# Patient Record
Sex: MALE
[De-identification: names, ages, dates, MRNs, and addresses within clinical notes are randomized per-mention and may not be internally consistent; named-entity substitution may affect disease eponyms.]

---

## 2018-03-28 ENCOUNTER — APPOINTMENT (OUTPATIENT)
Dept: CARDIOLOGY | Facility: CLINIC | Age: 83
End: 2018-03-28

## 2018-03-28 PROBLEM — Z00.00 ENCOUNTER FOR PREVENTIVE HEALTH EXAMINATION: Status: ACTIVE | Noted: 2018-03-28

## 2019-04-08 ENCOUNTER — INPATIENT (INPATIENT)
Facility: HOSPITAL | Age: 84
LOS: 10 days | Discharge: NURSING, REHAB OR LONG-TERM CARE | End: 2019-04-19
Attending: INTERNAL MEDICINE | Admitting: INTERNAL MEDICINE
Payer: MEDICARE

## 2019-04-08 VITALS
DIASTOLIC BLOOD PRESSURE: 65 MMHG | RESPIRATION RATE: 17 BRPM | SYSTOLIC BLOOD PRESSURE: 128 MMHG | HEART RATE: 86 BPM | OXYGEN SATURATION: 100 % | TEMPERATURE: 98 F

## 2019-04-08 LAB
BASOPHILS # BLD AUTO: 0.03 K/UL — SIGNIFICANT CHANGE UP (ref 0–0.2)
BASOPHILS NFR BLD AUTO: 0.3 % — SIGNIFICANT CHANGE UP (ref 0–2)
EOSINOPHIL # BLD AUTO: 0.12 K/UL — SIGNIFICANT CHANGE UP (ref 0–0.5)
EOSINOPHIL NFR BLD AUTO: 1.1 % — SIGNIFICANT CHANGE UP (ref 0–6)
HCT VFR BLD CALC: 39.5 % — SIGNIFICANT CHANGE UP (ref 39–50)
HGB BLD-MCNC: 13.2 G/DL — SIGNIFICANT CHANGE UP (ref 13–17)
IMM GRANULOCYTES NFR BLD AUTO: 0.5 % — SIGNIFICANT CHANGE UP (ref 0–1.5)
LYMPHOCYTES # BLD AUTO: 1.95 K/UL — SIGNIFICANT CHANGE UP (ref 1–3.3)
LYMPHOCYTES # BLD AUTO: 18 % — SIGNIFICANT CHANGE UP (ref 13–44)
MCHC RBC-ENTMCNC: 30.3 PG — SIGNIFICANT CHANGE UP (ref 27–34)
MCHC RBC-ENTMCNC: 33.4 % — SIGNIFICANT CHANGE UP (ref 32–36)
MCV RBC AUTO: 90.6 FL — SIGNIFICANT CHANGE UP (ref 80–100)
MONOCYTES # BLD AUTO: 1.02 K/UL — HIGH (ref 0–0.9)
MONOCYTES NFR BLD AUTO: 9.4 % — SIGNIFICANT CHANGE UP (ref 2–14)
NEUTROPHILS # BLD AUTO: 7.67 K/UL — HIGH (ref 1.8–7.4)
NEUTROPHILS NFR BLD AUTO: 70.7 % — SIGNIFICANT CHANGE UP (ref 43–77)
NRBC # FLD: 0 K/UL — SIGNIFICANT CHANGE UP (ref 0–0)
PLATELET # BLD AUTO: 189 K/UL — SIGNIFICANT CHANGE UP (ref 150–400)
PMV BLD: 10.7 FL — SIGNIFICANT CHANGE UP (ref 7–13)
RBC # BLD: 4.36 M/UL — SIGNIFICANT CHANGE UP (ref 4.2–5.8)
RBC # FLD: 13.7 % — SIGNIFICANT CHANGE UP (ref 10.3–14.5)
WBC # BLD: 10.84 K/UL — HIGH (ref 3.8–10.5)
WBC # FLD AUTO: 10.84 K/UL — HIGH (ref 3.8–10.5)

## 2019-04-08 PROCEDURE — 71046 X-RAY EXAM CHEST 2 VIEWS: CPT | Mod: 26

## 2019-04-08 NOTE — ED PROVIDER NOTE - OBJECTIVE STATEMENT
89M h/o dementia, HTN, DM, frequent falls presents s/p 5 falls today. Family describes that pt stands, starts shaking and then falls. One time hit his head and had LOC for ~5-10 seconds. Was admitted to [facility] last week for similar. Pt frequently gets agitated, has questionable allergy to haldol. Received versed in EMS with improvement in agitation. Pt does not recall falls, does not know why he is here. Denies fever, nausea, vomiting, urinary symptoms, hip pain, back pain, head pain, neck pain. No blood thinners.   PMD: Dr Galeano

## 2019-04-08 NOTE — ED ADULT NURSE NOTE - OBJECTIVE STATEMENT
Pt. received into room # 9, A&O x2 with confusion, brought in by family for frequent falls and syncope tonight after falling. small bruise noted to L knee  and ecchymotic area to R arm. 20g noted to L FA placed by ems. no distress noted. patient placed on fall precautions. call bell within reach, red socks placed on feet.

## 2019-04-08 NOTE — ED PROVIDER NOTE - CARE PLAN
Principal Discharge DX:	Dementia  Secondary Diagnosis:	Falls frequently  Secondary Diagnosis:	Syncope and collapse

## 2019-04-08 NOTE — ED PROVIDER NOTE - CLINICAL SUMMARY MEDICAL DECISION MAKING FREE TEXT BOX
Patient presenting with altered mental status and frequent falls/ ?syncope, likely worsening dementia  - CBC, CMP, VBG, UA, CXR, tox, trop  - CTH/c-spine  - Adm

## 2019-04-08 NOTE — ED PROVIDER NOTE - PROGRESS NOTE DETAILS
[place]agar PGY-4: spoke to Dr Galeano, will admit when results are in. Sagar PGY-4: CT negative, admitted to Dr Galeano, text paged tele PA/MAR

## 2019-04-08 NOTE — ED ADULT NURSE NOTE - INTERVENTIONS DEFINITIONS
[place] to call system/Call bell, personal items and telephone within reach/Instruct patient to call for assistance/Physically safe environment: no spills, clutter or unnecessary equipment/Stretcher in lowest position, wheels locked, appropriate side rails in place/Provide visual cue, wrist band, yellow gown, etc./Monitor gait and stability/Monitor for mental status changes and reorient to person, place, and time/Review medications for side effects contributing to fall risk/Reinforce activity limits and safety measures with patient and family/Provide visual clues: red socks

## 2019-04-08 NOTE — ED ADULT TRIAGE NOTE - CHIEF COMPLAINT QUOTE
as per wife pt has had 5 falls today, hitting back of head with +LOC, wife states she is unable to take care of him at home d/2 the worsening dementia and falls. given 2 mg of versed by EMS

## 2019-04-08 NOTE — ED PROVIDER NOTE - ATTENDING CONTRIBUTION TO CARE
Dr. Gamino: I have personally performed a face to face bedside history and physical examination of this patient. I have discussed the history, examination, review of systems, assessment and plan of management with the resident. I have reviewed the electronic medical record and amended it to reflect my history, review of systems, physical exam, assessment and plan.     Attending Exam - Dr. Gamino: GEN: well appearing, NAD oriented only to self and place  HEENT: +PERRL, EOMI  RESP: CTAB, no signs of respiratory distress CV: s1s2 RRR ABD: soft/non tender/non distended  MSK: no deformities / swelling, normal range of motion, spine grossly normal NEURO: alert oriented x 2 , otherwise non focal exam SKIN: normal color / temperature / condition.

## 2019-04-09 DIAGNOSIS — Z29.9 ENCOUNTER FOR PROPHYLACTIC MEASURES, UNSPECIFIED: ICD-10-CM

## 2019-04-09 DIAGNOSIS — I10 ESSENTIAL (PRIMARY) HYPERTENSION: ICD-10-CM

## 2019-04-09 DIAGNOSIS — F03.90 UNSPECIFIED DEMENTIA WITHOUT BEHAVIORAL DISTURBANCE: ICD-10-CM

## 2019-04-09 DIAGNOSIS — R55 SYNCOPE AND COLLAPSE: ICD-10-CM

## 2019-04-09 DIAGNOSIS — E11.8 TYPE 2 DIABETES MELLITUS WITH UNSPECIFIED COMPLICATIONS: ICD-10-CM

## 2019-04-09 DIAGNOSIS — F03.91 UNSPECIFIED DEMENTIA WITH BEHAVIORAL DISTURBANCE: ICD-10-CM

## 2019-04-09 DIAGNOSIS — N17.9 ACUTE KIDNEY FAILURE, UNSPECIFIED: ICD-10-CM

## 2019-04-09 DIAGNOSIS — Z87.19 PERSONAL HISTORY OF OTHER DISEASES OF THE DIGESTIVE SYSTEM: ICD-10-CM

## 2019-04-09 LAB
ALBUMIN SERPL ELPH-MCNC: 4.1 G/DL — SIGNIFICANT CHANGE UP (ref 3.3–5)
ALP SERPL-CCNC: 72 U/L — SIGNIFICANT CHANGE UP (ref 40–120)
ALT FLD-CCNC: 24 U/L — SIGNIFICANT CHANGE UP (ref 4–41)
ANION GAP SERPL CALC-SCNC: 14 MMO/L — SIGNIFICANT CHANGE UP (ref 7–14)
APAP SERPL-MCNC: < 15 UG/ML — LOW (ref 15–25)
APPEARANCE UR: CLEAR — SIGNIFICANT CHANGE UP
APTT BLD: 21.2 SEC — LOW (ref 27.5–36.3)
AST SERPL-CCNC: 24 U/L — SIGNIFICANT CHANGE UP (ref 4–40)
BACTERIA # UR AUTO: NEGATIVE — SIGNIFICANT CHANGE UP
BILIRUB SERPL-MCNC: 0.4 MG/DL — SIGNIFICANT CHANGE UP (ref 0.2–1.2)
BILIRUB UR-MCNC: NEGATIVE — SIGNIFICANT CHANGE UP
BLOOD UR QL VISUAL: NEGATIVE — SIGNIFICANT CHANGE UP
BUN SERPL-MCNC: 48 MG/DL — HIGH (ref 7–23)
CALCIUM SERPL-MCNC: 9.6 MG/DL — SIGNIFICANT CHANGE UP (ref 8.4–10.5)
CHLORIDE SERPL-SCNC: 104 MMOL/L — SIGNIFICANT CHANGE UP (ref 98–107)
CO2 SERPL-SCNC: 21 MMOL/L — LOW (ref 22–31)
COLOR SPEC: SIGNIFICANT CHANGE UP
CREAT SERPL-MCNC: 1.68 MG/DL — HIGH (ref 0.5–1.3)
ETHANOL BLD-MCNC: < 10 MG/DL — SIGNIFICANT CHANGE UP
GLUCOSE BLDC GLUCOMTR-MCNC: 139 MG/DL — HIGH (ref 70–99)
GLUCOSE SERPL-MCNC: 122 MG/DL — HIGH (ref 70–99)
GLUCOSE UR-MCNC: >1000 — HIGH
HYALINE CASTS # UR AUTO: NEGATIVE — SIGNIFICANT CHANGE UP
INR BLD: 1.05 — SIGNIFICANT CHANGE UP (ref 0.88–1.17)
KETONES UR-MCNC: NEGATIVE — SIGNIFICANT CHANGE UP
LEUKOCYTE ESTERASE UR-ACNC: NEGATIVE — SIGNIFICANT CHANGE UP
NITRITE UR-MCNC: NEGATIVE — SIGNIFICANT CHANGE UP
PH UR: 5.5 — SIGNIFICANT CHANGE UP (ref 5–8)
POTASSIUM SERPL-MCNC: 4.6 MMOL/L — SIGNIFICANT CHANGE UP (ref 3.5–5.3)
POTASSIUM SERPL-SCNC: 4.6 MMOL/L — SIGNIFICANT CHANGE UP (ref 3.5–5.3)
PROT SERPL-MCNC: 7.2 G/DL — SIGNIFICANT CHANGE UP (ref 6–8.3)
PROT UR-MCNC: 20 — SIGNIFICANT CHANGE UP
PROTHROM AB SERPL-ACNC: 12 SEC — SIGNIFICANT CHANGE UP (ref 9.8–13.1)
RBC CASTS # UR COMP ASSIST: SIGNIFICANT CHANGE UP (ref 0–?)
SALICYLATES SERPL-MCNC: < 5 MG/DL — LOW (ref 15–30)
SODIUM SERPL-SCNC: 139 MMOL/L — SIGNIFICANT CHANGE UP (ref 135–145)
SP GR SPEC: 1.02 — SIGNIFICANT CHANGE UP (ref 1–1.04)
SQUAMOUS # UR AUTO: SIGNIFICANT CHANGE UP
TROPONIN T, HIGH SENSITIVITY: 49 NG/L — SIGNIFICANT CHANGE UP (ref ?–14)
TSH SERPL-MCNC: 0.61 UIU/ML — SIGNIFICANT CHANGE UP (ref 0.27–4.2)
UROBILINOGEN FLD QL: NORMAL — SIGNIFICANT CHANGE UP
WBC UR QL: SIGNIFICANT CHANGE UP (ref 0–?)

## 2019-04-09 PROCEDURE — 90792 PSYCH DIAG EVAL W/MED SRVCS: CPT

## 2019-04-09 PROCEDURE — 70450 CT HEAD/BRAIN W/O DYE: CPT | Mod: 26

## 2019-04-09 PROCEDURE — 72125 CT NECK SPINE W/O DYE: CPT | Mod: 26

## 2019-04-09 RX ORDER — INSULIN LISPRO 100/ML
VIAL (ML) SUBCUTANEOUS
Refills: 0 | Status: DISCONTINUED | OUTPATIENT
Start: 2019-04-09 | End: 2019-04-19

## 2019-04-09 RX ORDER — DEXTROSE 50 % IN WATER 50 %
15 SYRINGE (ML) INTRAVENOUS ONCE
Refills: 0 | Status: DISCONTINUED | OUTPATIENT
Start: 2019-04-09 | End: 2019-04-19

## 2019-04-09 RX ORDER — HEPARIN SODIUM 5000 [USP'U]/ML
5000 INJECTION INTRAVENOUS; SUBCUTANEOUS EVERY 8 HOURS
Refills: 0 | Status: DISCONTINUED | OUTPATIENT
Start: 2019-04-09 | End: 2019-04-19

## 2019-04-09 RX ORDER — LANOLIN ALCOHOL/MO/W.PET/CERES
3 CREAM (GRAM) TOPICAL
Refills: 0 | Status: DISCONTINUED | OUTPATIENT
Start: 2019-04-09 | End: 2019-04-19

## 2019-04-09 RX ORDER — SODIUM CHLORIDE 9 MG/ML
1000 INJECTION, SOLUTION INTRAVENOUS
Refills: 0 | Status: DISCONTINUED | OUTPATIENT
Start: 2019-04-09 | End: 2019-04-19

## 2019-04-09 RX ORDER — DEXTROSE 50 % IN WATER 50 %
25 SYRINGE (ML) INTRAVENOUS ONCE
Refills: 0 | Status: DISCONTINUED | OUTPATIENT
Start: 2019-04-09 | End: 2019-04-19

## 2019-04-09 RX ORDER — QUETIAPINE FUMARATE 200 MG/1
12.5 TABLET, FILM COATED ORAL AT BEDTIME
Refills: 0 | Status: DISCONTINUED | OUTPATIENT
Start: 2019-04-09 | End: 2019-04-11

## 2019-04-09 RX ORDER — GLUCAGON INJECTION, SOLUTION 0.5 MG/.1ML
1 INJECTION, SOLUTION SUBCUTANEOUS ONCE
Refills: 0 | Status: DISCONTINUED | OUTPATIENT
Start: 2019-04-09 | End: 2019-04-19

## 2019-04-09 RX ORDER — DEXTROSE 50 % IN WATER 50 %
12.5 SYRINGE (ML) INTRAVENOUS ONCE
Refills: 0 | Status: DISCONTINUED | OUTPATIENT
Start: 2019-04-09 | End: 2019-04-19

## 2019-04-09 RX ADMIN — Medication 3 MILLIGRAM(S): at 21:59

## 2019-04-09 NOTE — CHART NOTE - NSCHARTNOTEFT_GEN_A_CORE
See ER notes  89 yo male, s/p multiple falls yesterday  ED w/u negative  Pt alert, and oriented at present   Will need to review records from [place].   Hydrate, check for orthostatics.                         13.2   10.84 )-----------( 189      ( 08 Apr 2019 23:00 )             39.5     08 Apr 2019 23:00    139    |  104    |  48     ----------------------------<  122    4.6     |  21     |  1.68     Ca    9.6        08 Apr 2019 23:00    TPro  7.2    /  Alb  4.1    /  TBili  0.4    /  DBili  x      /  AST  24     /  ALT  24     /  AlkPhos  72     08 Apr 2019 23:00    PT/INR - ( 08 Apr 2019 23:00 )   PT: 12.0 SEC;   INR: 1.05          PTT - ( 08 Apr 2019 23:00 )  PTT:21.2 SEC

## 2019-04-09 NOTE — H&P ADULT - NSICDXPASTMEDICALHX_GEN_ALL_CORE_FT
PAST MEDICAL HISTORY:  Dementia Dx in 2014    DM (diabetes mellitus) on glipizide    Essential hypertension     HTN (hypertension)     Prostate cancer s/p Radiation seeding, on remission for past 25 years PAST MEDICAL HISTORY:  Dementia Dx in 2014    DM (diabetes mellitus) on glipizide    Essential hypertension     GIB (gastrointestinal bleeding) due to ASA 20 years ago, no egd/ colonoscopy    History of goiter Rt sided dx'd 5 years ago    [place] (hard of hearing) Refused Hearing Aides    Prostate cancer s/p Radiation seeding, in remission for past 25 years

## 2019-04-09 NOTE — H&P ADULT - PROBLEM SELECTOR PLAN 2
Consider Psych consult for med recommendation   Hold haldol due to s/e of transient right facial paralysis for 10hrs  Fall precaution   Cont with 1:1 monitoring Psych consult called, Cont with 1:1 monitoring for now

## 2019-04-09 NOTE — ED ADULT NURSE REASSESSMENT NOTE - NS ED NURSE REASSESS COMMENT FT1
Assumed care pt from night shift RN. Pt in room 10 alert to name, time and . Pt attempting to get out of bed several times, pt easily redirected will continue to monitor pt, awaiting bed assignment

## 2019-04-09 NOTE — BEHAVIORAL HEALTH ASSESSMENT NOTE - SUMMARY
89 yr old M with hx of dementia, DM, Macular Degeneration, Prostate CA admitted to [facility] with recurrent falls and syncope.  Psychiatry consulted to evaluate for worsening dementia with behavioural disturbance vs acute delerium.  Based on collateral history, pt is likely at baseline mental status with no evidence of acute confusional state.  Dtr states pt often has behavioural disturbance at home, including visual hallucinations and aggression, and his current mental status is not worse than usual.  Dtr would not like haldol to be used at this time.    At this time would recommend:  Continue 1:1 at this time  Redirection and reorientation  melatonin 3mg at 8PM daily  seroquel 25 mg qhs PRN for agitation/aggression 89 yr old M with hx of dementia, DM, Macular Degeneration, Prostate CA admitted to [facility] with recurrent falls and syncope.  Psychiatry consulted to evaluate for worsening dementia with behavioural disturbance vs acute delirium.    Based on collateral history, pt is likely at his baseline mental status with no evidence of acute confusional state- moderate to severe dementia with increased supervisional needs/supports in community, intermittent agitation and VH. Family at this time is not keen on starting a standing antipsychotic.     RECOMMENDATIONS=  - Consider an enhanced observation.   - Redirection and reorientation as needed.  - Avoid bzd, anticholinergics and antihistamines  - Labs- vitamin b12 level, folate level, RPR  - Melatonin 3mg at 8PM daily  - Agitation= seroquel 25 mg qhs PRN for agitation/aggression  - SW consult

## 2019-04-09 NOTE — BEHAVIORAL HEALTH ASSESSMENT NOTE - NSBHCHARTREVIEWVS_PSY_A_CORE FT
Vital Signs Last 24 Hrs  T(C): 36.1 (09 Apr 2019 13:25), Max: 36.7 (09 Apr 2019 08:42)  T(F): 97 (09 Apr 2019 13:25), Max: 98 (09 Apr 2019 08:42)  HR: 80 (09 Apr 2019 13:25) (78 - 87)  BP: 155/86 (09 Apr 2019 13:25) (128/65 - 176/73)  BP(mean): --  RR: 17 (09 Apr 2019 13:25) (16 - 17)  SpO2: 100% (09 Apr 2019 13:25) (100% - 100%)
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
## 2019-04-09 NOTE — BEHAVIORAL HEALTH ASSESSMENT NOTE - NSBHCHARTREVIEWLAB_PSY_A_CORE FT
13.2   10.84 )-----------( 189      ( 08 Apr 2019 23:00 )             39.5     04-08    139  |  104  |  48<H>  ----------------------------<  122<H>  4.6   |  21<L>  |  1.68<H>    Ca    9.6      08 Apr 2019 23:00    TPro  7.2  /  Alb  4.1  /  TBili  0.4  /  DBili  x   /  AST  24  /  ALT  24  /  AlkPhos  72  04-08

## 2019-04-09 NOTE — H&P ADULT - HISTORY OF PRESENT ILLNESS
Pt is 89 y/o M with Pmhx of Dementia, DM II, Macular degeneration, Prostate CA ( s/p Radiation seeding on remission for past 25 years), HTN was brought to ED by EMS for syncope and fall X 5 that happened yesterday afternoon around 1pm. Pt is agitated and unable to provide history, therefore collateral information was obtained from wife ( Mary [phone]). Per wife, pt fell 5x, with 4:00am, 4:10, 11:00am, 11:30am, 1:pm time frame. Per wife, at 1pm pt fell and hit back of his head with LOC about 5-10 sec ,does not recall any urinary incontinence and denies any seizure activity. Per wife, pt had repeated falls for past months, with 8 being the most falls. Per wife, pt have fluctuating blood pressure throughout the day ( with highest being 185/86 and lowest being 100/50 and fingerstick ranges from ( 189-300). Per wife, admits pt is adequately hydrated and nourished, but admits to poor quality of life due to worsening dementia. Per wife, noticed  decline in memory, paranoid at times, agitated, and worsening dementia for past 6-8months. Per wife, admits  was recently admitted to [facility] for agitation, and was given Haldol, but had side effect to medication , which he had right sided facial paralysis for 10 hours, now resolved. Per wife, with her best judgment in past 24hrs, pt did not endorse nausea, vomiting, fever, chills, abdominal pain, shortness of breath.     On admission, pt is agitated and not alert to time and place and situation. 89 y/o M with Pmhx of Dementia x5 years, DM II, Macular degeneration, Prostate CA ( s/p Radiation seeding on remission for past 25 years), HTN, h/o GIB was brought to ED by EMS for syncope and fall X 5 that happened yesterday, last episode in the afternoon around 1pm. Pt is agitated and unable to provide history, therefore collateral information was obtained from wife ( Mary [phone]). Per wife, pt fell 5x, at 4:00am, 4:10, 11:00am, 11:30am, 1:pm time frame. Per wife, at 1pm pt fell and hit back of his head with LOC about 5-10 sec ,does not recall any urinary incontinence and denies any seizure activity. Per wife, pt had repeated falls for past months, with 8x being the most falls in 1 day. Per wife, pt has fluctuating blood pressure throughout the day ( with highest being 185/86 and lowest being 100/50 and fingerstick ranges from ( 189-300). Per wife, admits pt is adequately hydrated and nourished, but admits to poor quality of life due to worsening dementia. Per wife, noticed  decline in memory, paranoid at times, agitated, and worsening dementia for past 6-8months. Per wife, admits  was recently admitted to [facility] for agitation, and was given Haldol, but had side effect to medication , which he had right sided facial paralysis for 10 hours, now resolved. Per wife, with her best judgment in past 24hrs, pt did not endorse nausea, vomiting, fever, chills, abdominal pain, shortness of breath.     On admission, pt is agitated and not alert to time and place and situation.

## 2019-04-09 NOTE — H&P ADULT - PROBLEM SELECTOR PLAN 3
cont with Glipizide   monitor FBS   Order Hgb A1C  Diabetic Diet GFR 22%, Trend serial BMP's, consider Renal eval

## 2019-04-09 NOTE — H&P ADULT - PROBLEM SELECTOR PLAN 1
Admitted to telemetry  Syncope 2/2 orthostatic hypotension   CT head: negative for intracranial hemorrhage   obtain TTE   Check Orthostatics vital signs   Serial EKGs  Trend cardiac enzymes: 49  Monitor FBS

## 2019-04-09 NOTE — H&P ADULT - PROBLEM SELECTOR PROBLEM 4
Need for prophylactic measure Type 2 diabetes mellitus with complication, without long-term current use of insulin

## 2019-04-09 NOTE — H&P ADULT - NSICDXFAMILYHX_GEN_ALL_CORE_FT
FAMILY HISTORY:  FH: macular degeneration, Mother and father  FH: type 2 diabetes, Mother, Father, Brother, and sister

## 2019-04-09 NOTE — H&P ADULT - NSHPSOCIALHISTORY_GEN_ALL_CORE
Pt currently lives at home with wife. At baseline, pt is unable to perform ADL, requires full assistance. Per wife, quit smoking about 25 years ago and denies current use of EtOH and illicit drugs. Pt did not receive the 2018 influenza vaccination.

## 2019-04-09 NOTE — H&P ADULT - PROBLEM SELECTOR PROBLEM 3
Type 2 diabetes mellitus with complication, without long-term current use of insulin BECCA (acute kidney injury)

## 2019-04-09 NOTE — H&P ADULT - PROBLEM SELECTOR PLAN 4
Ambulatory as tolerated  Heparin 5000unit SQ q 8hrs monitor FBS and cover with ISS, Order Hgb A1C, Diabetic Diet

## 2019-04-09 NOTE — H&P ADULT - ASSESSMENT
Pt is 89 y/o M with Pmhx of Dementia, DM II, Macular degeneration, Prostate CA ( s/p Radiation seeding on remission for past 25 years), HTN was brought to ED by EMS for syncope and fall X 5 that happened yesterday afternoon around 1pm. Pt is admitted to telemetry for syncope and fall likely to orthostatic hypotension and worsening dementia.       EKG: NSR @ 84 bpm  BUN/Cr: 48/1.68  Serum Glucose: 122  WBC: 10.84  C-xray: clear lungs  CT head: No evidence of intracranial hemorrhage, mass effect or displaced clavicular fracture  CT spine: No evidence of acute displaced fracture or traumatic malalignment cervical degeneration spondylosis, multiple thyroid nodules as described, largest is a lobulated heterogenous right thyroid nodule 4.5X3.8cm  Toxicology panel: APAP < 15ug/ml, Salicylate <15ug/ml, Ethanol <10.0ug/ml Pt is 89 y/o M with Pmhx of Dementia, DM II, GIB 20 years ago, Macular degeneration, Prostate CA ( s/p Radiation seeding in remission for past 25 years), HTN was brought to ED by EMS for syncope and fall X 5 that happened yesterday. Pt is admitted to telemetry for syncope.      EKG: NSR @ 84 bpm  BUN/Cr: 48/1.68

## 2019-04-10 LAB
ALBUMIN SERPL ELPH-MCNC: 3.8 G/DL — SIGNIFICANT CHANGE UP (ref 3.3–5)
ALP SERPL-CCNC: 60 U/L — SIGNIFICANT CHANGE UP (ref 40–120)
ALT FLD-CCNC: 20 U/L — SIGNIFICANT CHANGE UP (ref 4–41)
ANION GAP SERPL CALC-SCNC: 10 MMO/L — SIGNIFICANT CHANGE UP (ref 7–14)
AST SERPL-CCNC: 22 U/L — SIGNIFICANT CHANGE UP (ref 4–40)
BACTERIA UR CULT: SIGNIFICANT CHANGE UP
BASOPHILS # BLD AUTO: 0.04 K/UL — SIGNIFICANT CHANGE UP (ref 0–0.2)
BASOPHILS NFR BLD AUTO: 0.4 % — SIGNIFICANT CHANGE UP (ref 0–2)
BILIRUB SERPL-MCNC: 0.7 MG/DL — SIGNIFICANT CHANGE UP (ref 0.2–1.2)
BUN SERPL-MCNC: 33 MG/DL — HIGH (ref 7–23)
CALCIUM SERPL-MCNC: 9.7 MG/DL — SIGNIFICANT CHANGE UP (ref 8.4–10.5)
CHLORIDE SERPL-SCNC: 102 MMOL/L — SIGNIFICANT CHANGE UP (ref 98–107)
CHOLEST SERPL-MCNC: 188 MG/DL — SIGNIFICANT CHANGE UP (ref 120–199)
CO2 SERPL-SCNC: 24 MMOL/L — SIGNIFICANT CHANGE UP (ref 22–31)
CREAT SERPL-MCNC: 1.4 MG/DL — HIGH (ref 0.5–1.3)
EOSINOPHIL # BLD AUTO: 0.2 K/UL — SIGNIFICANT CHANGE UP (ref 0–0.5)
EOSINOPHIL NFR BLD AUTO: 2.2 % — SIGNIFICANT CHANGE UP (ref 0–6)
FOLATE SERPL-MCNC: 16.3 NG/ML — SIGNIFICANT CHANGE UP (ref 4.7–20)
GLUCOSE BLDC GLUCOMTR-MCNC: 129 MG/DL — HIGH (ref 70–99)
GLUCOSE BLDC GLUCOMTR-MCNC: 142 MG/DL — HIGH (ref 70–99)
GLUCOSE BLDC GLUCOMTR-MCNC: 159 MG/DL — HIGH (ref 70–99)
GLUCOSE BLDC GLUCOMTR-MCNC: 180 MG/DL — HIGH (ref 70–99)
GLUCOSE SERPL-MCNC: 129 MG/DL — HIGH (ref 70–99)
HBA1C BLD-MCNC: 6.1 % — HIGH (ref 4–5.6)
HCT VFR BLD CALC: 39.9 % — SIGNIFICANT CHANGE UP (ref 39–50)
HDLC SERPL-MCNC: 50 MG/DL — SIGNIFICANT CHANGE UP (ref 35–55)
HGB BLD-MCNC: 13.4 G/DL — SIGNIFICANT CHANGE UP (ref 13–17)
IMM GRANULOCYTES NFR BLD AUTO: 0.6 % — SIGNIFICANT CHANGE UP (ref 0–1.5)
LIPID PNL WITH DIRECT LDL SERPL: 144 MG/DL — SIGNIFICANT CHANGE UP
LYMPHOCYTES # BLD AUTO: 1.7 K/UL — SIGNIFICANT CHANGE UP (ref 1–3.3)
LYMPHOCYTES # BLD AUTO: 18.3 % — SIGNIFICANT CHANGE UP (ref 13–44)
MAGNESIUM SERPL-MCNC: 2 MG/DL — SIGNIFICANT CHANGE UP (ref 1.6–2.6)
MCHC RBC-ENTMCNC: 30.2 PG — SIGNIFICANT CHANGE UP (ref 27–34)
MCHC RBC-ENTMCNC: 33.6 % — SIGNIFICANT CHANGE UP (ref 32–36)
MCV RBC AUTO: 90.1 FL — SIGNIFICANT CHANGE UP (ref 80–100)
MONOCYTES # BLD AUTO: 0.83 K/UL — SIGNIFICANT CHANGE UP (ref 0–0.9)
MONOCYTES NFR BLD AUTO: 8.9 % — SIGNIFICANT CHANGE UP (ref 2–14)
NEUTROPHILS # BLD AUTO: 6.45 K/UL — SIGNIFICANT CHANGE UP (ref 1.8–7.4)
NEUTROPHILS NFR BLD AUTO: 69.6 % — SIGNIFICANT CHANGE UP (ref 43–77)
NRBC # FLD: 0 K/UL — SIGNIFICANT CHANGE UP (ref 0–0)
PHOSPHATE SERPL-MCNC: 2.6 MG/DL — SIGNIFICANT CHANGE UP (ref 2.5–4.5)
PLATELET # BLD AUTO: 212 K/UL — SIGNIFICANT CHANGE UP (ref 150–400)
PMV BLD: 10.1 FL — SIGNIFICANT CHANGE UP (ref 7–13)
POTASSIUM SERPL-MCNC: 4.4 MMOL/L — SIGNIFICANT CHANGE UP (ref 3.5–5.3)
POTASSIUM SERPL-SCNC: 4.4 MMOL/L — SIGNIFICANT CHANGE UP (ref 3.5–5.3)
PROT SERPL-MCNC: 6.9 G/DL — SIGNIFICANT CHANGE UP (ref 6–8.3)
RBC # BLD: 4.43 M/UL — SIGNIFICANT CHANGE UP (ref 4.2–5.8)
RBC # FLD: 13.5 % — SIGNIFICANT CHANGE UP (ref 10.3–14.5)
SODIUM SERPL-SCNC: 136 MMOL/L — SIGNIFICANT CHANGE UP (ref 135–145)
SPECIMEN SOURCE: SIGNIFICANT CHANGE UP
T PALLIDUM AB TITR SER: NEGATIVE — SIGNIFICANT CHANGE UP
TRIGL SERPL-MCNC: 72 MG/DL — SIGNIFICANT CHANGE UP (ref 10–149)
TSH SERPL-MCNC: 0.64 UIU/ML — SIGNIFICANT CHANGE UP (ref 0.27–4.2)
VIT B12 SERPL-MCNC: 652 PG/ML — SIGNIFICANT CHANGE UP (ref 200–900)
WBC # BLD: 9.28 K/UL — SIGNIFICANT CHANGE UP (ref 3.8–10.5)
WBC # FLD AUTO: 9.28 K/UL — SIGNIFICANT CHANGE UP (ref 3.8–10.5)

## 2019-04-10 PROCEDURE — 93010 ELECTROCARDIOGRAM REPORT: CPT

## 2019-04-10 PROCEDURE — 99233 SBSQ HOSP IP/OBS HIGH 50: CPT

## 2019-04-10 RX ORDER — OLANZAPINE 15 MG/1
5 TABLET, FILM COATED ORAL ONCE
Refills: 0 | Status: COMPLETED | OUTPATIENT
Start: 2019-04-10 | End: 2019-04-10

## 2019-04-10 RX ORDER — HYDRALAZINE HCL 50 MG
2.5 TABLET ORAL ONCE
Refills: 0 | Status: COMPLETED | OUTPATIENT
Start: 2019-04-10 | End: 2019-04-10

## 2019-04-10 RX ORDER — HYDRALAZINE HCL 50 MG
2.5 TABLET ORAL ONCE
Refills: 0 | Status: DISCONTINUED | OUTPATIENT
Start: 2019-04-10 | End: 2019-04-10

## 2019-04-10 RX ADMIN — Medication 2.5 MILLIGRAM(S): at 00:21

## 2019-04-10 RX ADMIN — QUETIAPINE FUMARATE 12.5 MILLIGRAM(S): 200 TABLET, FILM COATED ORAL at 20:58

## 2019-04-10 RX ADMIN — OLANZAPINE 5 MILLIGRAM(S): 15 TABLET, FILM COATED ORAL at 06:34

## 2019-04-10 NOTE — PROGRESS NOTE BEHAVIORAL HEALTH - NSBHFUPINTERVALHXFT_PSY_A_CORE
Pt seen this morning after he was given 5mg zyprexa dose because he was restless and agitated.  He had an episode of A.fib overnight on telemetry.  Sleepy but rousable to voice. Pt seen this morning after he was given 5mg zyprexa dose because he was restless and agitated.  He had an episode of A.fib overnight on telemetry.  Sleepy but rousable to voice. Does not engage in any conversation as yesterday. No cogwheeling or rigidity on exam

## 2019-04-10 NOTE — CHART NOTE - NSCHARTNOTEFT_GEN_A_CORE
RRT called for syncope     89 y/o M with Pmhx of Dementia x5 years, DM II, Macular degeneration, Prostate CA ( s/p Radiation seeding on remission for past 25 years), HTN, h/o GIB was brought to ED by EMS for syncope and fall.   Patient had been on 1:1 which was discontinued and he had ambulated to the bathroom and passed out on his way back to the bed. Patient helped to a chair by staff and did not hit his head. Syncope was transient and only lasted 10 seconds. Mental status at baseline as per staff. Likely vasovagal syncope.     MD SIERRA Berry

## 2019-04-10 NOTE — CHART NOTE - NSCHARTNOTEFT_GEN_A_CORE
Informed by nurse that patient became agitated and ripped of tele monitor. Patient has order for Seroquel prn as recommended by psych for agitation but is not accepting oral medications. I spoke to pharmacy who recommended olanzapine 5mg IM which I ordered. Will continue to monitor.

## 2019-04-10 NOTE — PROGRESS NOTE BEHAVIORAL HEALTH - SUMMARY
89 yr old M with hx of dementia, DM, Macular Degeneration, Prostate CA admitted to [facility] with recurrent falls and syncope.  Psychiatry consulted for dementia with behavioural symptoms.  Pt received one dose of IM zyprexa 5 mg which may be contributing to lethargy observed today    Recommendations:  -Continue enhanced supervision  -Continue redirection and reorientation as needed  -Cardiac clearence for use for antipsychotic medications  -Continue seroquel 12.5 mg PO prn for agitation  -Would decrease IM zyprexa to 1.25 mg BID PRN for agitation 89 yr old M with hx of dementia, DM, Macular Degeneration, Prostate CA admitted to [facility] with recurrent falls and syncope.  Found to be in atrial fibrillation on telemetry.  At baseline pt requires help with all iADLs and some ADLs.  Is likely unable to be left unsupervised for long periods of time and does have episodes of agitation/aggression.  Psychiatry consulted for dementia with behavioural symptoms.  Pt lethargic today on assessment likely due to 5 mg IM zyprexa given earlier in the morning for agitation    Recommendations:  -Continue enhanced supervision  -Continue redirection and reorientation as needed  -Cardiac clearence for use for antipsychotic medications  -Continue seroquel 12.5 mg PO prn for agitation  -IM zyprexa to 1.25 mg BID PRN for agitation if pt refusing to take PO  -Recommend SW consult due to difficulty for family to manage pt at home 89 yr old M with hx of dementia, DM, Macular Degeneration, Prostate CA admitted to [facility] with recurrent falls and syncope.  Found to be in atrial fibrillation on telemetry.  At baseline pt requires help with all iADLs and some ADLs.  Is likely unable to be left unsupervised for long periods of time and does have episodes of agitation/aggression.  Psychiatry consulted for dementia with behavioural symptoms.  Pt lethargic today on assessment likely due to 5 mg IM zyprexa given earlier in the morning for agitation    Recommendations:  -Enhanced supervision  -Continue redirection and reorientation as needed  -Cardiac clearance for use for antipsychotic medications bea in light of new Afib.  -Continue Seroquel 12.5 mg PO prn for agitation  - ACUTE AGGRESSION NOT RESPONDING TO VERBAL REDIRECTION- IM zyprexa to 1.25 mg BID PRN   -Recommend SW consult due to difficulty for family to manage pt at home

## 2019-04-10 NOTE — CHART NOTE - NSCHARTNOTEFT_GEN_A_CORE
PA note  Pt noted to push PCA out of the way and start to walk to bathroom when he started urinating on the floor and then start sliding to the floor. Pt was caught by RN and placed in a chair. Pt unresponsive for several seconds and then back to baseline MS. A rapid response was called. Pt VS where stable , CM revealed a fib in 90's. Pt presently resting comfortably without complaints. Pt placed on a 1 to 1 for safety .   Rafaela COTO

## 2019-04-10 NOTE — PROGRESS NOTE ADULT - SUBJECTIVE AND OBJECTIVE BOX
Patient is a 89y old  Male who presents with a chief complaint of Syncope and fall X5 yesterday (09 Apr 2019 12:01)      INTERVAL HPI/OVERNIGHT EVENTS: pt now AF     MEDICATIONS  (STANDING):  dextrose 5%. 1000 milliLiter(s) (50 mL/Hr) IV Continuous <Continuous>  dextrose 50% Injectable 12.5 Gram(s) IV Push once  dextrose 50% Injectable 25 Gram(s) IV Push once  dextrose 50% Injectable 25 Gram(s) IV Push once  heparin  Injectable 5000 Unit(s) SubCutaneous every 8 hours  insulin lispro (HumaLOG) corrective regimen sliding scale   SubCutaneous three times a day before meals  melatonin 3 milliGRAM(s) Oral <User Schedule>      ]      Allergies    Haldol (Other)    Intolerances    aspirin (Unknown)          PHYSICAL EXAM:  Vital Signs Last 24 Hrs  T(C): 36.8 (10 Apr 2019 04:45), Max: 36.9 (09 Apr 2019 21:00)  T(F): 98.2 (10 Apr 2019 04:45), Max: 98.4 (09 Apr 2019 21:00)  HR: 87 (10 Apr 2019 04:45) (80 - 98)  BP: 152/92 (10 Apr 2019 04:45) (152/92 - 199/74)  BP(mean): --  RR: 18 (10 Apr 2019 04:45) (17 - 18)  SpO2: 100% (10 Apr 2019 04:45) (100% - 100%)    GENERAL: NAD, well-groomed, well-developed  HEAD:  Atraumatic, Normocephalic  EYES: EOMI, PERRLA, conjunctiva and sclera clear  NECK: Supple, No JVD, Normal thyroid  NERVOUS SYSTEM:  Alert & Oriented X3, Good concentration;  and symmetric  CHEST/LUNG: Clear to auscultation bilaterally; No rales, rhonchi, wheezing, or rubs  HEART: S1S2 irregularly irregular, without murmur, rub nor gallop  ABDOMEN: Soft, Nontender, Nondistended; Bowel sounds present  EXTREMITIES:  2+ Peripheral Pulses, No clubbing, cyanosis, or edema      LABS:                        13.4   9.28  )-----------( 212      ( 10 Apr 2019 06:45 )             39.9     10 Apr 2019 06:45    136    |  102    |  33     ----------------------------<  129    4.4     |  24     |  1.40     Ca    9.7        10 Apr 2019 06:45  Phos  2.6       10 Apr 2019 06:45  Mg     2.0       10 Apr 2019 06:45    TPro  6.9    /  Alb  3.8    /  TBili  0.7    /  DBili  x      /  AST  22     /  ALT  20     /  AlkPhos  60     10 Apr 2019 06:45    PT/INR - ( 08 Apr 2019 23:00 )   PT: 12.0 SEC;   INR: 1.05          PTT - ( 08 Apr 2019 23:00 )  PTT:21.2 SEC  CAPILLARY BLOOD GLUCOSE      POCT Blood Glucose.: 142 mg/dL (10 Apr 2019 09:15)  POCT Blood Glucose.: 139 mg/dL (09 Apr 2019 21:33)      TELEMETRY: af        assessment:  AF, FALLS, 	dementia     Plan:   for echo.  Defer AC for AF due to falls/fall risk   Pending echo, consider EP eval for antiarrhythmic meds (doubt [facility])

## 2019-04-11 LAB
GLUCOSE BLDC GLUCOMTR-MCNC: 104 MG/DL — HIGH (ref 70–99)
GLUCOSE BLDC GLUCOMTR-MCNC: 122 MG/DL — HIGH (ref 70–99)
GLUCOSE BLDC GLUCOMTR-MCNC: 138 MG/DL — HIGH (ref 70–99)
GLUCOSE BLDC GLUCOMTR-MCNC: 179 MG/DL — HIGH (ref 70–99)

## 2019-04-11 PROCEDURE — 99233 SBSQ HOSP IP/OBS HIGH 50: CPT

## 2019-04-11 RX ORDER — QUETIAPINE FUMARATE 200 MG/1
25 TABLET, FILM COATED ORAL ONCE
Refills: 0 | Status: COMPLETED | OUTPATIENT
Start: 2019-04-11 | End: 2019-04-11

## 2019-04-11 RX ORDER — OLANZAPINE 15 MG/1
2.5 TABLET, FILM COATED ORAL ONCE
Refills: 0 | Status: COMPLETED | OUTPATIENT
Start: 2019-04-11 | End: 2019-04-11

## 2019-04-11 RX ORDER — OLANZAPINE 15 MG/1
1.25 TABLET, FILM COATED ORAL ONCE
Refills: 0 | Status: COMPLETED | OUTPATIENT
Start: 2019-04-11 | End: 2019-04-11

## 2019-04-11 RX ORDER — QUETIAPINE FUMARATE 200 MG/1
25 TABLET, FILM COATED ORAL
Refills: 0 | Status: DISCONTINUED | OUTPATIENT
Start: 2019-04-11 | End: 2019-04-11

## 2019-04-11 RX ORDER — QUETIAPINE FUMARATE 200 MG/1
25 TABLET, FILM COATED ORAL DAILY
Refills: 0 | Status: DISCONTINUED | OUTPATIENT
Start: 2019-04-12 | End: 2019-04-12

## 2019-04-11 RX ORDER — OLANZAPINE 15 MG/1
2.5 TABLET, FILM COATED ORAL EVERY 12 HOURS
Refills: 0 | Status: DISCONTINUED | OUTPATIENT
Start: 2019-04-11 | End: 2019-04-12

## 2019-04-11 RX ADMIN — QUETIAPINE FUMARATE 25 MILLIGRAM(S): 200 TABLET, FILM COATED ORAL at 21:21

## 2019-04-11 RX ADMIN — OLANZAPINE 2.5 MILLIGRAM(S): 15 TABLET, FILM COATED ORAL at 17:02

## 2019-04-11 RX ADMIN — OLANZAPINE 1.25 MILLIGRAM(S): 15 TABLET, FILM COATED ORAL at 05:46

## 2019-04-11 RX ADMIN — Medication 3 MILLIGRAM(S): at 21:21

## 2019-04-11 RX ADMIN — OLANZAPINE 2.5 MILLIGRAM(S): 15 TABLET, FILM COATED ORAL at 11:15

## 2019-04-11 NOTE — PROGRESS NOTE BEHAVIORAL HEALTH - PRN MEDS
IM zyprexa given this AM for agitation
seroquel 12.5 mg PO once as 9PM   zyprexa 1.25 mg IM once at 6AM

## 2019-04-11 NOTE — PROVIDER CONTACT NOTE (OTHER) - ASSESSMENT
pt asymptomatic with no c/o distress
Pt aggravated
Pt asymptomatic
Patient extremely agitated, attempting to get oob, combative attempting to hit RN and PCA sitting at bedside. Patient not able to be reoriented

## 2019-04-11 NOTE — PROVIDER CONTACT NOTE (OTHER) - ACTION/TREATMENT ORDERED:
Tele cherelle COTO, made aware. IVP hydralazine given as per orders. will continue to monitor
RR called see sheet
Cards consult. continue to monitor
Continue to try
awaiting orders

## 2019-04-11 NOTE — PHYSICAL THERAPY INITIAL EVALUATION ADULT - ADDITIONAL COMMENTS
As per chart review, pt currently lives at home with wife. At baseline, pt is unable to perform ADL, requires full assistance.

## 2019-04-11 NOTE — PROGRESS NOTE BEHAVIORAL HEALTH - SUMMARY
89 yr old M with hx of dementia, DM, Macular Degeneration, Prostate CA admitted to [facility] with recurrent falls and syncope.  Found to be in atrial fibrillation on telemetry.  At baseline pt requires help with all iADLs and some ADLs.  Is likely unable to be left unsupervised for long periods of time and does have episodes of agitation/aggression.  Psychiatry consulted for dementia with behavioural symptoms.  Pt still with attempts to get out of bed despite use of zyprexa 1.25 and seroquel 12.5    Recommendations:  -Enhanced supervision  -Continue redirection and reorientation as needed  -Cardiac clearance for use for antipsychotic medications bea in light of new Afib.  -Increase seroquel to 25 mg PRN at 6PM for agitation/aggression  -ACUTE AGGRESSION NOT RESPONDING TO VERBAL REDIRECTION- Increase IM zyprexa to 2.5 mg IM BID  -Recommend SW consult due to difficulty for family to manage pt at home.  -Will discuss with family about starting standing antipsychotic at night for behavioural symptoms 89 yr old M with hx of dementia, DM, Macular Degeneration, Prostate CA admitted to [facility] with recurrent falls and syncope.  Found to be in atrial fibrillation on telemetry.  At baseline pt requires help with all iADLs and some ADLs.  Is likely unable to be left unsupervised for long periods of time and does have episodes of agitation/aggression.  Psychiatry consulted for dementia with behavioural symptoms.  Pt still with attempts to get out of bed despite use of zyprexa 1.25 and seroquel 12.5    Recommendations:  -Enhanced supervision  -Continue redirection and reorientation as needed  -Cardiac clearance for use for antipsychotic medications bea in light of new Afib.  -Increase seroquel to 25 mg PRN at 6PM for agitation/aggression  -ACUTE AGGRESSION NOT RESPONDING TO VERBAL REDIRECTION- Increase IM zyprexa to 2.5 mg IM PRN BID  -Recommend SW consult due to difficulty for family to manage pt at home.  -Will discuss with family about starting standing antipsychotic at night for behavioural symptoms

## 2019-04-11 NOTE — PHYSICAL THERAPY INITIAL EVALUATION ADULT - PLANNED THERAPY INTERVENTIONS, PT EVAL
Patient left supine in bed in NAD, call bell in reach, all lines intact. +bed check/balance training/bed mobility training/gait training/strengthening/transfer training

## 2019-04-11 NOTE — PROGRESS NOTE BEHAVIORAL HEALTH - CASE SUMMARY
Met with the patient, discussed case with Dr Batista, impression and plan agreed up. No consent from daughter, and so will keep above medications as as needed for agitation.
Met with the patient with Dr Batista, impression and plan discussed and agreed upon. Patient is sedated today, and does not engage in any conversation. New onset Afib  Can give Seroquel 12.5mg q hs prn po and for acute aggression lower dose of Zyprexa to 1.25mg BID PRN IM. Would need cardiac clearance for antipsychotic use

## 2019-04-11 NOTE — PROGRESS NOTE BEHAVIORAL HEALTH - NSBHFUPINTERVALHXFT_PSY_A_CORE
Pt became had an episode of syncope overnight when he tried to get out of bed to go to the bathroom.  Rapid response was called and pt placed back in bed.  He received seroquel 12.5 mg PO at 9PM last night with not much effect according to nursing staff.  He also received zyprexa 1.25 mg IM this morning as the pt was getting out bed.      Today, the pt is unaware that he in a hospital.  He repeatedly attempted to get out of bed this morning, stating that "I need to get my wife".  He denies any pain.

## 2019-04-11 NOTE — PROVIDER CONTACT NOTE (OTHER) - RECOMMENDATIONS
RR called
COntinue to monitor
Continue to try
Patient has no PRNs ordered, requesting PRN to help calm patient and to be more cooperative

## 2019-04-11 NOTE — PROGRESS NOTE ADULT - SUBJECTIVE AND OBJECTIVE BOX
Patient is a 89y old  Male who presents with a chief complaint of Syncope and fall X5 yesterday (10 Apr 2019 09:36)      INTERVAL HPI/OVERNIGHT EVENTS: see rapid response note    MEDICATIONS  (STANDING):  dextrose 5%. 1000 milliLiter(s) (50 mL/Hr) IV Continuous <Continuous>  dextrose 50% Injectable 12.5 Gram(s) IV Push once  dextrose 50% Injectable 25 Gram(s) IV Push once  dextrose 50% Injectable 25 Gram(s) IV Push once  heparin  Injectable 5000 Unit(s) SubCutaneous every 8 hours  insulin lispro (HumaLOG) corrective regimen sliding scale   SubCutaneous three times a day before meals  melatonin 3 milliGRAM(s) Oral <User Schedule>    MEDICATIONS  (PRN):  dextrose 40% Gel 15 Gram(s) Oral once PRN Blood Glucose LESS THAN 70 milliGRAM(s)/deciliter  glucagon  Injectable 1 milliGRAM(s) IntraMuscular once PRN Glucose LESS THAN 70 milligrams/deciliter  QUEtiapine 12.5 milliGRAM(s) Oral at bedtime PRN Agitation        Orders last 24 hours:  POCT  Blood Glucose (04-10-19 @ 09:17)  POCT  Blood Glucose (04-10-19 @ 12:35)  Complete Blood Count: AM Sched. Collection: 11-Apr-2019 04:00 (04-10-19 @ 15:52)  Basic Metabolic Panel: AM Sched. Collection: 11-Apr-2019 04:00 (04-10-19 @ 15:52)  POCT  Blood Glucose (04-10-19 @ 18:00)  POCT  Blood Glucose (04-10-19 @ 18:28)  OLANZapine Injectable: [Ordered as ZyPREXA Injectable]  1.25 milliGRAM(s), IntraMuscular, once, Stop After 1 Doses  Administration Instructions: Reconstitute 10 mG vial with 2.1 mL sterile water  Final concentration 5mG/mL  This is a Look-alike/Sound-alike Medication  Provider's Contact #: [phone] (04-11-19 @ 05:29)  POCT  Blood Glucose (04-11-19 @ 08:43)      Allergies    Haldol (Other)    Intolerances    aspirin (Unknown)            PHYSICAL EXAM:  Vital Signs Last 24 Hrs  T(C): --  T(F): --  HR: --  BP: --  BP(mean): --  RR: --  SpO2: --    GENERAL: NAD, well-groomed, well-developed  HEAD:  Atraumatic, Normocephalic  EYES: EOMI, PERRLA, conjunctiva and sclera clear  NECK: Supple, No JVD, Normal thyroid  NERVOUS SYSTEM: confused   CHEST/LUNG: Clear to auscultation bilaterally; No rales, rhonchi, wheezing, or rubs  HEART: S1S2 regular, without murmur, rub nor gallop  ABDOMEN: Soft, Nontender, Nondistended; Bowel sounds present        LABS:      Ca    9.7        10 Apr 2019 06:45        CAPILLARY BLOOD GLUCOSE      POCT Blood Glucose.: 122 mg/dL (11 Apr 2019 08:32)  POCT Blood Glucose.: 180 mg/dL (10 Apr 2019 18:27)  POCT Blood Glucose.: 159 mg/dL (10 Apr 2019 17:59)  POCT Blood Glucose.: 129 mg/dL (10 Apr 2019 12:34)  POCT Blood Glucose.: 142 mg/dL (10 Apr 2019 09:15)      TELEMETRY: Now NSR     Hemoglobin A1C, Whole Blood in AM (04.10.19 @ 06:45)    Hemoglobin A1C, Whole Blood: 6.1: High Risk (prediabetic)    5.7 - 6.4 %  Diabetic, diagnostic           > 6.5 %  ADA diabetic treatment goal    < 7.0 %          assessment:  CLARISA Alatorre    Plan:   awaiting echo     D/W PA

## 2019-04-12 DIAGNOSIS — G25.71 DRUG INDUCED AKATHISIA: ICD-10-CM

## 2019-04-12 LAB
GLUCOSE BLDC GLUCOMTR-MCNC: 143 MG/DL — HIGH (ref 70–99)
GLUCOSE BLDC GLUCOMTR-MCNC: 173 MG/DL — HIGH (ref 70–99)
GLUCOSE BLDC GLUCOMTR-MCNC: 98 MG/DL — SIGNIFICANT CHANGE UP (ref 70–99)

## 2019-04-12 PROCEDURE — 99233 SBSQ HOSP IP/OBS HIGH 50: CPT

## 2019-04-12 RX ORDER — CLONAZEPAM 1 MG
0.25 TABLET ORAL AT BEDTIME
Refills: 0 | Status: DISCONTINUED | OUTPATIENT
Start: 2019-04-12 | End: 2019-04-18

## 2019-04-12 RX ORDER — OLANZAPINE 15 MG/1
2.5 TABLET, FILM COATED ORAL ONCE
Refills: 0 | Status: DISCONTINUED | OUTPATIENT
Start: 2019-04-12 | End: 2019-04-12

## 2019-04-12 RX ORDER — CLONAZEPAM 1 MG
0.25 TABLET ORAL AT BEDTIME
Refills: 0 | Status: DISCONTINUED | OUTPATIENT
Start: 2019-04-12 | End: 2019-04-12

## 2019-04-12 RX ADMIN — OLANZAPINE 2.5 MILLIGRAM(S): 15 TABLET, FILM COATED ORAL at 04:57

## 2019-04-12 RX ADMIN — HEPARIN SODIUM 5000 UNIT(S): 5000 INJECTION INTRAVENOUS; SUBCUTANEOUS at 13:33

## 2019-04-12 RX ADMIN — Medication 3 MILLIGRAM(S): at 20:23

## 2019-04-12 RX ADMIN — Medication 0.25 MILLIGRAM(S): at 20:23

## 2019-04-12 NOTE — PROGRESS NOTE ADULT - SUBJECTIVE AND OBJECTIVE BOX
Patient is a 89y old  Male who presents with a chief complaint of Syncope and fall X5 yesterday (11 Apr 2019 08:46)      INTERVAL HPI/OVERNIGHT EVENTS: none. Agitated overnight     MEDICATIONS  (STANDING):  dextrose 5%. 1000 milliLiter(s) (50 mL/Hr) IV Continuous <Continuous>  dextrose 50% Injectable 12.5 Gram(s) IV Push once  dextrose 50% Injectable 25 Gram(s) IV Push once  dextrose 50% Injectable 25 Gram(s) IV Push once  heparin  Injectable 5000 Unit(s) SubCutaneous every 8 hours  insulin lispro (HumaLOG) corrective regimen sliding scale   SubCutaneous three times a day before meals  melatonin 3 milliGRAM(s) Oral <User Schedule>  OLANZapine Injectable 2.5 milliGRAM(s) IntraMuscular every 12 hours    MEDICATIONS  (PRN):  dextrose 40% Gel 15 Gram(s) Oral once PRN Blood Glucose LESS THAN 70 milliGRAM(s)/deciliter  glucagon  Injectable 1 milliGRAM(s) IntraMuscular once PRN Glucose LESS THAN 70 milligrams/deciliter  QUEtiapine 25 milliGRAM(s) Oral daily PRN To be given at 6PM for agitation if needed        Allergies    Haldol (Other)    Intolerances    aspirin (Unknown)      REVIEW OF SYSTEMS:        PHYSICAL EXAM:  Vital Signs Last 24 Hrs  T(C): 36.8 (12 Apr 2019 04:51), Max: 36.8 (12 Apr 2019 04:51)  T(F): 98.3 (12 Apr 2019 04:51), Max: 98.3 (12 Apr 2019 04:51)  HR: 86 (12 Apr 2019 04:51) (82 - 86)  BP: 142/85 (12 Apr 2019 04:51) (141/75 - 148/90)  BP(mean): --  RR: 18 (12 Apr 2019 04:51) (18 - 19)  SpO2: 99% (12 Apr 2019 04:51) (99% - 99%)    GENERAL: NAD, well-groomed, well-developed  HEAD:  Atraumatic, Normocephalic  EYES: EOMI, PERRLA, conjunctiva and sclera clear  NECK: Supple, No JVD, Normal thyroid  NERVOUS SYSTEM:  confused  CHEST/LUNG: Clear to auscultation bilaterally; No rales, rhonchi, wheezing, or rubs  HEART: S1S2 regular, without murmur, rub nor gallop  ABDOMEN: Soft, Nontender, Nondistended; Bowel sounds present        < from: CT Head No Cont (04.09.19 @ 00:42) >  HEAD:    There is no acute hemorrhage, mass effect, midline shift, hydrocephalus,   or extra-axial fluid collection. There are patchy areas of   low-attenuation in the bihemispheric white matter without associated mass   effect, nonspecific but likely the sequela of mild chronic microvascular   change. Partially calcified 8 mm extra-axial soft tissue mass along the   medial left frontal convexity is likely a meningioma.    The ventricles, sulci and cisternal spaces are mildly diffusely prominent   although in proportion compatible with cerebral volume loss. There is no   midline shift or abnormal extra-axial fluid collection.    The intraorbital compartments are unremarkable.    Cyst versus polyp in the left sphenoid sinus. The mastoid air cells are   clear.    < end of copied text >            CAPILLARY BLOOD GLUCOSE      POCT Blood Glucose.: 138 mg/dL (11 Apr 2019 21:42)  POCT Blood Glucose.: 104 mg/dL (11 Apr 2019 17:26)  POCT Blood Glucose.: 179 mg/dL (11 Apr 2019 13:14)      TELEMETRY: NSR        Thyroid Stimulating Hormone, Serum in AM (04.10.19 @ 06:45)    Thyroid Stimulating Hormone, Serum: 0.64 uIU/mL      assessment:  Dementia, PAF      Plan:   await echo[facility]  [facility]   consider Am[facility]    consider neuro eval.

## 2019-04-12 NOTE — PROVIDER CONTACT NOTE (OTHER) - SITUATION
PA made aware patient confused, combative when trying to assess patient, uncooperative and unable to redirect/reorient- PA aware unable to draw AM labs

## 2019-04-12 NOTE — PROGRESS NOTE BEHAVIORAL HEALTH - SUMMARY
89 yr old M with hx of dementia, DM, Macular Degeneration, Prostate CA admitted to [facility] with recurrent falls and syncope.  Found to be in atrial fibrillation on telemetry.  At baseline pt requires help with all iADLs and some ADLs.  Is likely unable to be left unsupervised for long periods of time and does have episodes of agitation/aggression.  Psychiatry consulted for dementia with behavioural symptoms.     4/12= Increasingly restless, purposeless movements in bed. Multiple antipsychotic prn needs in the last 24 hours- likely akathisia.     Recommendations:  -Enhanced supervision  -Continue redirection and reorientation as needed  -Cardiac clearance for use for antipsychotic medications bea in light of new Afib.  - Labs= RECHECK BUN+ Cr. Ensure hydration.   - Ensure patient is not constipated.   - Start Klonopin 0.25mg q HS PO- hold for rr<12.   - Family not consenting to standing antipsychotic use at this point. Will hold off in light of akathisia regardless.   - Will need to consider a Palliative medicine consult next week if continued decline.   - ACUTE AGGRESSION- Zyprexa 1.25mg q 6hrs prn im.

## 2019-04-12 NOTE — PROGRESS NOTE BEHAVIORAL HEALTH - NSBHFUPINTERVALHXFT_PSY_A_CORE
Family meeting held this afternoon- daughter+ wife by patient's side, and son on the phone. Patient is notably restless in bed, purposeless movement of extremities, trying to get out of bed- this is a change from how he was yesterday and day before. Remains confused and disoriented.   Daughter states that patient was on Xanax 0.25mg PO a few months ago and that was very helpful for sleep, restlessness, and distress.   Discussed black box warning of use of antipsychotics in patients with dementia- daughter does not want standing antipsychotics.   Family signed DNR, and is keen on palliation at this point.   Discussed at length about akathisia presenting as worsening agitation, role for benzodiazepine use, and the risks involved- falls, confusion. Son who is a pharmacist is in agreement and so is the daughter.

## 2019-04-13 LAB
ANION GAP SERPL CALC-SCNC: 15 MMO/L — HIGH (ref 7–14)
BUN SERPL-MCNC: 44 MG/DL — HIGH (ref 7–23)
CALCIUM SERPL-MCNC: 9.7 MG/DL — SIGNIFICANT CHANGE UP (ref 8.4–10.5)
CHLORIDE SERPL-SCNC: 106 MMOL/L — SIGNIFICANT CHANGE UP (ref 98–107)
CO2 SERPL-SCNC: 21 MMOL/L — LOW (ref 22–31)
CREAT SERPL-MCNC: 1.59 MG/DL — HIGH (ref 0.5–1.3)
GLUCOSE BLDC GLUCOMTR-MCNC: 114 MG/DL — HIGH (ref 70–99)
GLUCOSE BLDC GLUCOMTR-MCNC: 117 MG/DL — HIGH (ref 70–99)
GLUCOSE BLDC GLUCOMTR-MCNC: 122 MG/DL — HIGH (ref 70–99)
GLUCOSE BLDC GLUCOMTR-MCNC: 123 MG/DL — HIGH (ref 70–99)
GLUCOSE SERPL-MCNC: 119 MG/DL — HIGH (ref 70–99)
HCT VFR BLD CALC: 39.6 % — SIGNIFICANT CHANGE UP (ref 39–50)
HGB BLD-MCNC: 13 G/DL — SIGNIFICANT CHANGE UP (ref 13–17)
MAGNESIUM SERPL-MCNC: 2.4 MG/DL — SIGNIFICANT CHANGE UP (ref 1.6–2.6)
MCHC RBC-ENTMCNC: 29.8 PG — SIGNIFICANT CHANGE UP (ref 27–34)
MCHC RBC-ENTMCNC: 32.8 % — SIGNIFICANT CHANGE UP (ref 32–36)
MCV RBC AUTO: 90.8 FL — SIGNIFICANT CHANGE UP (ref 80–100)
NRBC # FLD: 0 K/UL — SIGNIFICANT CHANGE UP (ref 0–0)
PHOSPHATE SERPL-MCNC: 3.5 MG/DL — SIGNIFICANT CHANGE UP (ref 2.5–4.5)
PLATELET # BLD AUTO: 239 K/UL — SIGNIFICANT CHANGE UP (ref 150–400)
PMV BLD: 9.7 FL — SIGNIFICANT CHANGE UP (ref 7–13)
POTASSIUM SERPL-MCNC: 4.3 MMOL/L — SIGNIFICANT CHANGE UP (ref 3.5–5.3)
POTASSIUM SERPL-SCNC: 4.3 MMOL/L — SIGNIFICANT CHANGE UP (ref 3.5–5.3)
RBC # BLD: 4.36 M/UL — SIGNIFICANT CHANGE UP (ref 4.2–5.8)
RBC # FLD: 13.6 % — SIGNIFICANT CHANGE UP (ref 10.3–14.5)
SODIUM SERPL-SCNC: 142 MMOL/L — SIGNIFICANT CHANGE UP (ref 135–145)
WBC # BLD: 7.95 K/UL — SIGNIFICANT CHANGE UP (ref 3.8–10.5)
WBC # FLD AUTO: 7.95 K/UL — SIGNIFICANT CHANGE UP (ref 3.8–10.5)

## 2019-04-13 RX ADMIN — Medication 0.25 MILLIGRAM(S): at 22:14

## 2019-04-13 RX ADMIN — Medication 3 MILLIGRAM(S): at 22:14

## 2019-04-13 RX ADMIN — HEPARIN SODIUM 5000 UNIT(S): 5000 INJECTION INTRAVENOUS; SUBCUTANEOUS at 04:19

## 2019-04-13 NOTE — PROGRESS NOTE ADULT - SUBJECTIVE AND OBJECTIVE BOX
Patient is a 89y old  Male who presents with a chief complaint of Syncope and fall X5 yesterday (12 Apr 2019 09:04)      INTERVAL HPI/OVERNIGHT EVENTS: agitated overnight     MEDICATIONS  (STANDING):  clonazePAM Oral Disintegrating Tablet 0.25 milliGRAM(s) Oral at bedtime  dextrose 5%. 1000 milliLiter(s) (50 mL/Hr) IV Continuous <Continuous>  dextrose 50% Injectable 12.5 Gram(s) IV Push once  dextrose 50% Injectable 25 Gram(s) IV Push once  dextrose 50% Injectable 25 Gram(s) IV Push once  heparin  Injectable 5000 Unit(s) SubCutaneous every 8 hours  insulin lispro (HumaLOG) corrective regimen sliding scale   SubCutaneous three times a day before meals  melatonin 3 milliGRAM(s) Oral <User Schedule>    MEDICATIONS  (PRN):  dextrose 40% Gel 15 Gram(s) Oral once PRN Blood Glucose LESS THAN 70 milliGRAM(s)/deciliter  glucagon  Injectable 1 milliGRAM(s) IntraMuscular once PRN Glucose LESS THAN 70 milligrams/deciliter          Allergies    Haldol (Other)    Intolerances    aspirin (Unknown)      REVIEW OF SYSTEMS:  CARDIOVASCULAR: No chest pain, palpitations, dizziness, or leg swelling; no shortness of breath     RESPIRATORY: No cough, wheezing, chills or hemoptysis; No shortness of breath    GASTROINTESTINAL: No abdominal or epigastric pain. No nausea, vomiting, or hematemesis; No diarrhea or constipation. No melena or hematochezia.    NEUROLOGICAL: No headaches, memory loss, loss of strength, numbness      PHYSICAL EXAM:  Vital Signs Last 24 Hrs  T(C): 36.7 (13 Apr 2019 04:16), Max: 36.7 (12 Apr 2019 20:47)  T(F): 98 (13 Apr 2019 04:16), Max: 98 (12 Apr 2019 20:47)  HR: 95 (13 Apr 2019 04:16) (89 - 95)  BP: 162/88 (13 Apr 2019 04:16) (154/89 - 162/88)  BP(mean): --  RR: 16 (13 Apr 2019 04:16) (16 - 16)  SpO2: 98% (13 Apr 2019 04:16) (97% - 98%)    GENERAL: NAD, well-groomed, well-developed  HEAD:  Atraumatic, Normocephalic  EYES: EOMI, PERRLA, conjunctiva and sclera clear  NECK: Supple, No JVD, Normal thyroid  NERVOUS SYSTEM:  Alert & Oriented X3, Good concentration;  and symmetric  CHEST/LUNG: Clear to auscultation bilaterally; No rales, rhonchi, wheezing, or rubs  HEART: S1S2 regular, without murmur, rub nor gallop  ABDOMEN: Soft, Nontender, Nondistended; Bowel sounds present        LABS:            CAPILLARY BLOOD GLUCOSE      POCT Blood Glucose.: 173 mg/dL (12 Apr 2019 22:14)  POCT Blood Glucose.: 98 mg/dL (12 Apr 2019 16:46)  POCT Blood Glucose.: 143 mg/dL (12 Apr 2019 09:08)      TELEMETRY: NSR         Consultant(s) Notes Reviewed:  behavioral med/ care coordinator       assessment:  remains NSR.   Echo remains pending  Monitor ECG    Plan:   Behavioral med/ placement.  Check labs.  awaiting echo     Care Discussed with Consultants/Other Providers:

## 2019-04-13 NOTE — PROVIDER CONTACT NOTE (OTHER) - DATE AND TIME:
10-Apr-2019 08:39
10-Apr-2019 12:00
10-Apr-2019 18:39
11-Apr-2019 10:54
12-Apr-2019 09:00
13-Apr-2019 15:29
09-Apr-2019 00:16

## 2019-04-14 LAB
GLUCOSE BLDC GLUCOMTR-MCNC: 112 MG/DL — HIGH (ref 70–99)
GLUCOSE BLDC GLUCOMTR-MCNC: 149 MG/DL — HIGH (ref 70–99)

## 2019-04-14 RX ADMIN — Medication 3 MILLIGRAM(S): at 19:50

## 2019-04-14 NOTE — PROGRESS NOTE ADULT - SUBJECTIVE AND OBJECTIVE BOX
Patient is a 89y old  Male who presents with a chief complaint of Syncope and fall X5 yesterday (13 Apr 2019 07:42)      INTERVAL HPI/OVERNIGHT EVENTS:    MEDICATIONS  (STANDING):  clonazePAM Oral Disintegrating Tablet 0.25 milliGRAM(s) Oral at bedtime  dextrose 5%. 1000 milliLiter(s) (50 mL/Hr) IV Continuous <Continuous>  dextrose 50% Injectable 12.5 Gram(s) IV Push once  dextrose 50% Injectable 25 Gram(s) IV Push once  dextrose 50% Injectable 25 Gram(s) IV Push once  heparin  Injectable 5000 Unit(s) SubCutaneous every 8 hours  insulin lispro (HumaLOG) corrective regimen sliding scale   SubCutaneous three times a day before meals  melatonin 3 milliGRAM(s) Oral <User Schedule>    MEDICATIONS  (PRN):  dextrose 40% Gel 15 Gram(s) Oral once PRN Blood Glucose LESS THAN 70 milliGRAM(s)/deciliter  glucagon  Injectable 1 milliGRAM(s) IntraMuscular once PRN Glucose LESS THAN 70 milligrams/deciliter        Orders last 24 hours:  POCT  Blood Glucose (04-13-19 @ 09:08)  POCT  Blood Glucose (04-13-19 @ 12:39)  POCT  Blood Glucose (04-13-19 @ 17:17)  POCT  Blood Glucose (04-13-19 @ 22:17)      Allergies    Haldol (Other)    Intolerances    aspirin (Unknown)            PHYSICAL EXAM:  Vital Signs Last 24 Hrs  T(C): 36.4 (14 Apr 2019 06:44), Max: 36.6 (13 Apr 2019 15:20)  T(F): 97.6 (14 Apr 2019 06:44), Max: 97.8 (13 Apr 2019 15:20)  HR: 75 (14 Apr 2019 06:44) (75 - 80)  BP: 159/89 (14 Apr 2019 06:44) (142/86 - 181/86)  BP(mean): --  RR: 16 (14 Apr 2019 06:44) (16 - 16)  SpO2: 98% (14 Apr 2019 06:44) (98% - 99%)    GENERAL: NAD, well-groomed, well-developed  HEAD:  Atraumatic, Normocephalic  EYES: EOMI, PERRLA, conjunctiva and sclera clear  NECK: Supple, No JVD, Normal thyroid  NERVOUS SYSTEM:  confused   CHEST/LUNG: Clear to auscultation bilaterally; No rales, rhonchi, wheezing, or rubs  HEART: S4S1S2 regular, without murmur, rub nor gallop  ABDOMEN: Soft, Nontender, Nondistended; Bowel sounds present        LABS:      Ca    9.7        13 Apr 2019 07:15        CAPILLARY BLOOD GLUCOSE      POCT Blood Glucose.: 123 mg/dL (13 Apr 2019 22:13)  POCT Blood Glucose.: 122 mg/dL (13 Apr 2019 17:16)  POCT Blood Glucose.: 114 mg/dL (13 Apr 2019 12:38)  POCT Blood Glucose.: 117 mg/dL (13 Apr 2019 09:06)    Telem- remains NSR        assessment:  pt stable.  Echo pending   Meanwhile remains NSR.  ? Placement   MOLST form signed in chart.

## 2019-04-15 LAB
ANION GAP SERPL CALC-SCNC: 14 MMO/L — SIGNIFICANT CHANGE UP (ref 7–14)
BUN SERPL-MCNC: 59 MG/DL — HIGH (ref 7–23)
CALCIUM SERPL-MCNC: 9.5 MG/DL — SIGNIFICANT CHANGE UP (ref 8.4–10.5)
CHLORIDE SERPL-SCNC: 102 MMOL/L — SIGNIFICANT CHANGE UP (ref 98–107)
CO2 SERPL-SCNC: 21 MMOL/L — LOW (ref 22–31)
CREAT SERPL-MCNC: 1.63 MG/DL — HIGH (ref 0.5–1.3)
GLUCOSE BLDC GLUCOMTR-MCNC: 108 MG/DL — HIGH (ref 70–99)
GLUCOSE BLDC GLUCOMTR-MCNC: 112 MG/DL — HIGH (ref 70–99)
GLUCOSE SERPL-MCNC: 127 MG/DL — HIGH (ref 70–99)
HCT VFR BLD CALC: 40.8 % — SIGNIFICANT CHANGE UP (ref 39–50)
HGB BLD-MCNC: 13.5 G/DL — SIGNIFICANT CHANGE UP (ref 13–17)
MAGNESIUM SERPL-MCNC: 2.4 MG/DL — SIGNIFICANT CHANGE UP (ref 1.6–2.6)
MCHC RBC-ENTMCNC: 30.7 PG — SIGNIFICANT CHANGE UP (ref 27–34)
MCHC RBC-ENTMCNC: 33.1 % — SIGNIFICANT CHANGE UP (ref 32–36)
MCV RBC AUTO: 92.7 FL — SIGNIFICANT CHANGE UP (ref 80–100)
NRBC # FLD: 0 K/UL — SIGNIFICANT CHANGE UP (ref 0–0)
PHOSPHATE SERPL-MCNC: 2.9 MG/DL — SIGNIFICANT CHANGE UP (ref 2.5–4.5)
PLATELET # BLD AUTO: 214 K/UL — SIGNIFICANT CHANGE UP (ref 150–400)
PMV BLD: 9.6 FL — SIGNIFICANT CHANGE UP (ref 7–13)
POTASSIUM SERPL-MCNC: 4.8 MMOL/L — SIGNIFICANT CHANGE UP (ref 3.5–5.3)
POTASSIUM SERPL-SCNC: 4.8 MMOL/L — SIGNIFICANT CHANGE UP (ref 3.5–5.3)
RBC # BLD: 4.4 M/UL — SIGNIFICANT CHANGE UP (ref 4.2–5.8)
RBC # FLD: 13.3 % — SIGNIFICANT CHANGE UP (ref 10.3–14.5)
SODIUM SERPL-SCNC: 137 MMOL/L — SIGNIFICANT CHANGE UP (ref 135–145)
WBC # BLD: 9.9 K/UL — SIGNIFICANT CHANGE UP (ref 3.8–10.5)
WBC # FLD AUTO: 9.9 K/UL — SIGNIFICANT CHANGE UP (ref 3.8–10.5)

## 2019-04-15 PROCEDURE — 93306 TTE W/DOPPLER COMPLETE: CPT | Mod: 26

## 2019-04-15 PROCEDURE — 99233 SBSQ HOSP IP/OBS HIGH 50: CPT

## 2019-04-15 RX ORDER — OLANZAPINE 15 MG/1
1.25 TABLET, FILM COATED ORAL ONCE
Refills: 0 | Status: DISCONTINUED | OUTPATIENT
Start: 2019-04-15 | End: 2019-04-15

## 2019-04-15 RX ORDER — SODIUM CHLORIDE 9 MG/ML
250 INJECTION INTRAMUSCULAR; INTRAVENOUS; SUBCUTANEOUS ONCE
Refills: 0 | Status: COMPLETED | OUTPATIENT
Start: 2019-04-15 | End: 2019-04-15

## 2019-04-15 RX ADMIN — Medication 3 MILLIGRAM(S): at 21:06

## 2019-04-15 RX ADMIN — SODIUM CHLORIDE 250 MILLILITER(S): 9 INJECTION INTRAMUSCULAR; INTRAVENOUS; SUBCUTANEOUS at 06:46

## 2019-04-15 RX ADMIN — HEPARIN SODIUM 5000 UNIT(S): 5000 INJECTION INTRAVENOUS; SUBCUTANEOUS at 21:06

## 2019-04-15 RX ADMIN — Medication 0.25 MILLIGRAM(S): at 18:05

## 2019-04-15 NOTE — PROGRESS NOTE BEHAVIORAL HEALTH - NSBHFUPINTERVALHXFT_PSY_A_CORE
Met with the patient. Much better in terms of restlessness and general attitude. Jovial, funny, engages very pleasantly with MD. Confused, disoriented, confabulating. No a/vh or paranoia. No distress.   Care coordinated with daughter- who states that patient is much better. She was hoping for an increased dose of Klonopin. Educated her that an increase dose would contribute to lethargy and falls risk at this point bea considering that akathisia is better treated

## 2019-04-15 NOTE — PROGRESS NOTE ADULT - SUBJECTIVE AND OBJECTIVE BOX
Patient is a 89y old  Male who presents with a chief complaint of Syncope and fall X5 yesterday (14 Apr 2019 08:07)      INTERVAL HPI/OVERNIGHT EVENTS: remains     MEDICATIONS  (STANDING):  clonazePAM Oral Disintegrating Tablet 0.25 milliGRAM(s) Oral at bedtime  dextrose 5%. 1000 milliLiter(s) (50 mL/Hr) IV Continuous <Continuous>  dextrose 50% Injectable 12.5 Gram(s) IV Push once  dextrose 50% Injectable 25 Gram(s) IV Push once  dextrose 50% Injectable 25 Gram(s) IV Push once  heparin  Injectable 5000 Unit(s) SubCutaneous every 8 hours  insulin lispro (HumaLOG) corrective regimen sliding scale   SubCutaneous three times a day before meals  melatonin 3 milliGRAM(s) Oral <User Schedule>    MEDICATIONS  (PRN):  dextrose 40% Gel 15 Gram(s) Oral once PRN Blood Glucose LESS THAN 70 milliGRAM(s)/deciliter  glucagon  Injectable 1 milliGRAM(s) IntraMuscular once PRN Glucose LESS THAN 70 milligrams/deciliter        Orders last 24 hours:  POCT  Blood Glucose (04-14-19 @ 13:13)  sodium chloride 0.9% Bolus:   250 milliLiter(s), IV Bolus, once, infuse over 1 Hour(s), Stop After 1 Doses  Provider's Contact #: [phone] (04-15-19 @ 06:06)  POCT  Blood Glucose (04-15-19 @ 08:41)      Allergies    Haldol (Other)    Intolerances    aspirin (Unknown)      REVIEW OF SYSTEMS:  CARDIOVASCULAR: No chest pain, palpitations, dizziness, or leg swelling; no shortness of breath     RESPIRATORY: No cough, wheezing, chills or hemoptysis; No shortness of breath    GASTROINTESTINAL: No abdominal or epigastric pain. No nausea, vomiting, or hematemesis; No diarrhea or constipation. No melena or hematochezia.    NEUROLOGICAL: No headaches, memory loss, loss of strength, numbness      PHYSICAL EXAM:  Vital Signs Last 24 Hrs  T(C): 36.9 (15 Apr 2019 06:16), Max: 36.9 (15 Apr 2019 06:16)  T(F): 98.5 (15 Apr 2019 06:16), Max: 98.5 (15 Apr 2019 06:16)  HR: 84 (15 Apr 2019 06:16) (75 - 84)  BP: 107/70 (15 Apr 2019 06:16) (107/70 - 165/78)  BP(mean): --  RR: 16 (15 Apr 2019 06:16) (16 - 18)  SpO2: 100% (15 Apr 2019 06:16) (99% - 100%)    GENERAL: NAD, well-groomed, well-developed  HEAD:  Atraumatic, Normocephalic  EYES: EOMI, PERRLA, conjunctiva and sclera clear  NECK: Supple, No JVD, Normal thyroid  NERVOUS SYSTEM:  Alert & confused   CHEST/LUNG: Clear to auscultation bilaterally; No rales, rhonchi, wheezing, or rubs  HEART: S1S2 regular, without murmur, rub nor gallop  ABDOMEN: Soft, Nontender, Nondistended; Bowel sounds present  EXTREMITIES:  2+ Peripheral Pulses, No clubbing, cyanosis, or edema      LABS:                        13.5   9.90  )-----------( 214      ( 15 Apr 2019 06:30 )             40.8     15 Apr 2019 06:30    137    |  102    |  59     ----------------------------<  127    4.8     |  21     |  1.63     Ca    9.5        15 Apr 2019 06:30  Phos  2.9       15 Apr 2019 06:30  Mg     2.4       15 Apr 2019 06:30        CAPILLARY BLOOD GLUCOSE      POCT Blood Glucose.: 112 mg/dL (15 Apr 2019 08:40)  POCT Blood Glucose.: 149 mg/dL (14 Apr 2019 13:02)        assessment:  pt noted to be orthostatic this morning      Plan:   remains NSR.  Need echo to properly treat orthostatic hypotension.   Both PAF and orthostasis can be the cause of patient' s syncopal episdoes     Care Discussed with Consultants/Other Providers: pt's PA

## 2019-04-15 NOTE — PROGRESS NOTE BEHAVIORAL HEALTH - NSBHCHARTREVIEWLAB_PSY_A_CORE FT
CBC Full  -  ( 15 Apr 2019 06:30 )  WBC Count : 9.90 K/uL  RBC Count : 4.40 M/uL  Hemoglobin : 13.5 g/dL  Hematocrit : 40.8 %  Platelet Count - Automated : 214 K/uL  Mean Cell Volume : 92.7 fL  Mean Cell Hemoglobin : 30.7 pg  Mean Cell Hemoglobin Concentration : 33.1 %  Auto Neutrophil # : x  Auto Lymphocyte # : x  Auto Monocyte # : x  Auto Eosinophil # : x  Auto Basophil # : x  Auto Neutrophil % : x  Auto Lymphocyte % : x  Auto Monocyte % : x  Auto Eosinophil % : x  Auto Basophil % : x  04-15    137  |  102  |  59<H>  ----------------------------<  127<H>  4.8   |  21<L>  |  1.63<H>    Ca    9.5      15 Apr 2019 06:30  Phos  2.9     04-15  Mg     2.4     04-15
04-10    136  |  102  |  33<H>  ----------------------------<  129<H>  4.4   |  24  |  1.40<H>    Ca    9.7      10 Apr 2019 06:45  Phos  2.6     04-10  Mg     2.0     04-10    TPro  6.9  /  Alb  3.8  /  TBili  0.7  /  DBili  x   /  AST  22  /  ALT  20  /  AlkPhos  60  04-10                          13.4   9.28  )-----------( 212      ( 10 Apr 2019 06:45 )             39.9
04-10    136  |  102  |  33<H>  ----------------------------<  129<H>  4.4   |  24  |  1.40<H>    Ca    9.7      10 Apr 2019 06:45  Phos  2.6     04-10  Mg     2.0     04-10    TPro  6.9  /  Alb  3.8  /  TBili  0.7  /  DBili  x   /  AST  22  /  ALT  20  /  AlkPhos  60  04-10                        13.4   9.28  )-----------( 212      ( 10 Apr 2019 06:45 )             39.9

## 2019-04-15 NOTE — PROGRESS NOTE BEHAVIORAL HEALTH - SUMMARY
89 yr old M with hx of dementia, DM, Macular Degeneration, Prostate CA admitted to [facility] with recurrent falls and syncope.  Found to be in atrial fibrillation on telemetry.  At baseline pt requires help with all iADLs and some ADLs.  Is likely unable to be left unsupervised for long periods of time and does have episodes of agitation/aggression.  Psychiatry consulted for dementia with behavioural symptoms.     4/12= Increasingly restless, purposeless movements in bed. Multiple antipsychotic prn needs in the last 24 hours- likely akathisia.    4/15- much improved     Recommendations:  -Enhanced supervision  -Continue redirection and reorientation as needed  -Cardiac clearance for use for antipsychotic medications bea in light of new Afib.  - Ensure hydration.   - Ensure patient is not constipated.   - Continue Klonopin 0.25mg q HS PO- hold for rr<12.   - Family not consenting to standing antipsychotic use at this point. Will hold off in light of akathisia regardless.      - ACUTE AGGRESSION- Zyprexa 1.25mg q 6hrs prn im.

## 2019-04-16 LAB
ANION GAP SERPL CALC-SCNC: 15 MMO/L — HIGH (ref 7–14)
BASOPHILS # BLD AUTO: 0.04 K/UL — SIGNIFICANT CHANGE UP (ref 0–0.2)
BASOPHILS NFR BLD AUTO: 0.6 % — SIGNIFICANT CHANGE UP (ref 0–2)
BUN SERPL-MCNC: 50 MG/DL — HIGH (ref 7–23)
CALCIUM SERPL-MCNC: 9.5 MG/DL — SIGNIFICANT CHANGE UP (ref 8.4–10.5)
CHLORIDE SERPL-SCNC: 104 MMOL/L — SIGNIFICANT CHANGE UP (ref 98–107)
CO2 SERPL-SCNC: 21 MMOL/L — LOW (ref 22–31)
CREAT SERPL-MCNC: 1.51 MG/DL — HIGH (ref 0.5–1.3)
EOSINOPHIL # BLD AUTO: 0.09 K/UL — SIGNIFICANT CHANGE UP (ref 0–0.5)
EOSINOPHIL NFR BLD AUTO: 1.3 % — SIGNIFICANT CHANGE UP (ref 0–6)
GLUCOSE BLDC GLUCOMTR-MCNC: 139 MG/DL — HIGH (ref 70–99)
GLUCOSE SERPL-MCNC: 139 MG/DL — HIGH (ref 70–99)
HCT VFR BLD CALC: 38.8 % — LOW (ref 39–50)
HGB BLD-MCNC: 12.9 G/DL — LOW (ref 13–17)
IMM GRANULOCYTES NFR BLD AUTO: 0.7 % — SIGNIFICANT CHANGE UP (ref 0–1.5)
LYMPHOCYTES # BLD AUTO: 1.24 K/UL — SIGNIFICANT CHANGE UP (ref 1–3.3)
LYMPHOCYTES # BLD AUTO: 18.3 % — SIGNIFICANT CHANGE UP (ref 13–44)
MAGNESIUM SERPL-MCNC: 2.5 MG/DL — SIGNIFICANT CHANGE UP (ref 1.6–2.6)
MCHC RBC-ENTMCNC: 30.1 PG — SIGNIFICANT CHANGE UP (ref 27–34)
MCHC RBC-ENTMCNC: 33.2 % — SIGNIFICANT CHANGE UP (ref 32–36)
MCV RBC AUTO: 90.7 FL — SIGNIFICANT CHANGE UP (ref 80–100)
MONOCYTES # BLD AUTO: 0.68 K/UL — SIGNIFICANT CHANGE UP (ref 0–0.9)
MONOCYTES NFR BLD AUTO: 10.1 % — SIGNIFICANT CHANGE UP (ref 2–14)
NEUTROPHILS # BLD AUTO: 4.66 K/UL — SIGNIFICANT CHANGE UP (ref 1.8–7.4)
NEUTROPHILS NFR BLD AUTO: 69 % — SIGNIFICANT CHANGE UP (ref 43–77)
NRBC # FLD: 0 K/UL — SIGNIFICANT CHANGE UP (ref 0–0)
PLATELET # BLD AUTO: 216 K/UL — SIGNIFICANT CHANGE UP (ref 150–400)
PMV BLD: 10.6 FL — SIGNIFICANT CHANGE UP (ref 7–13)
POTASSIUM SERPL-MCNC: 3.9 MMOL/L — SIGNIFICANT CHANGE UP (ref 3.5–5.3)
POTASSIUM SERPL-SCNC: 3.9 MMOL/L — SIGNIFICANT CHANGE UP (ref 3.5–5.3)
RBC # BLD: 4.28 M/UL — SIGNIFICANT CHANGE UP (ref 4.2–5.8)
RBC # FLD: 13.5 % — SIGNIFICANT CHANGE UP (ref 10.3–14.5)
SODIUM SERPL-SCNC: 140 MMOL/L — SIGNIFICANT CHANGE UP (ref 135–145)
WBC # BLD: 6.76 K/UL — SIGNIFICANT CHANGE UP (ref 3.8–10.5)
WBC # FLD AUTO: 6.76 K/UL — SIGNIFICANT CHANGE UP (ref 3.8–10.5)

## 2019-04-16 RX ADMIN — Medication 3 MILLIGRAM(S): at 22:43

## 2019-04-16 RX ADMIN — Medication 0.25 MILLIGRAM(S): at 22:43

## 2019-04-16 RX ADMIN — HEPARIN SODIUM 5000 UNIT(S): 5000 INJECTION INTRAVENOUS; SUBCUTANEOUS at 05:48

## 2019-04-16 NOTE — CHART NOTE - NSCHARTNOTEFT_GEN_A_CORE
PA note  Pt 's family requesting finger sticks be changed to daily.  Will change to every PM.  Rafaela Patrick PAC

## 2019-04-16 NOTE — PROGRESS NOTE ADULT - SUBJECTIVE AND OBJECTIVE BOX
Patient is a 89y old  Male who presents with a chief complaint of Syncope and fall X5 yesterday (15 Apr 2019 09:10)      INTERVAL HPI/OVERNIGHT EVENTS: nonw    MEDICATIONS  (STANDING):  clonazePAM Oral Disintegrating Tablet 0.25 milliGRAM(s) Oral at bedtime  dextrose 5%. 1000 milliLiter(s) (50 mL/Hr) IV Continuous <Continuous>  dextrose 50% Injectable 12.5 Gram(s) IV Push once  dextrose 50% Injectable 25 Gram(s) IV Push once  dextrose 50% Injectable 25 Gram(s) IV Push once  heparin  Injectable 5000 Unit(s) SubCutaneous every 8 hours  insulin lispro (HumaLOG) corrective regimen sliding scale   SubCutaneous three times a day before meals  melatonin 3 milliGRAM(s) Oral <User Schedule>    MEDICATIONS  (PRN):  dextrose 40% Gel 15 Gram(s) Oral once PRN Blood Glucose LESS THAN 70 milliGRAM(s)/deciliter  glucagon  Injectable 1 milliGRAM(s) IntraMuscular once PRN Glucose LESS THAN 70 milligrams/deciliter          Allergies    Haldol (Other)    Intolerances    aspirin (Unknown)      REVIEW OF SYSTEMS:  confused      PHYSICAL EXAM:  Vital Signs Last 24 Hrs  T(C): 36.6 (16 Apr 2019 05:46), Max: 36.6 (16 Apr 2019 05:46)  T(F): 97.8 (16 Apr 2019 05:46), Max: 97.8 (16 Apr 2019 05:46)  HR: 77 (16 Apr 2019 05:46) (77 - 85)  BP: 126/52 (16 Apr 2019 05:46) (126/52 - 145/73)  BP(mean): --  RR: 16 (16 Apr 2019 05:46) (16 - 18)  SpO2: 99% (16 Apr 2019 05:46) (99% - 100%)    GENERAL: NAD, well-groomed, well-developed  HEAD:  Atraumatic, Normocephalic  EYES: EOMI, PERRLA, conjunctiva and sclera clear  NECK: Supple, No JVD, Normal thyroid  NERVOUS SYSTEM:  Alert & Oriented X3, Good concentration;  and symmetric  CHEST/LUNG: Clear to auscultation bilaterally; No rales, rhonchi, wheezing, or rubs  HEART: S1S2 regular, without murmur, rub nor gallop  ABDOMEN: Soft, Nontender, Nondistended; Bowel sounds present  EXTREMITIES:  2+ Peripheral Pulses, No clubbing, cyanosis, or edema      LABS:                        12.9   6.76  )-----------( 216      ( 16 Apr 2019 06:05 )             38.8     16 Apr 2019 06:05    140    |  104    |  50     ----------------------------<  139    3.9     |  21     |  1.51     Ca    9.5        16 Apr 2019 06:05  Mg     2.5       16 Apr 2019 06:05        CAPILLARY BLOOD GLUCOSE      POCT Blood Glucose.: 139 mg/dL (16 Apr 2019 08:53)  POCT Blood Glucose.: 108 mg/dL (15 Apr 2019 12:59)      TELEMETRY: no further AF    < from: Transthoracic Echocardiogram (04.15.19 @ 14:05) >  ------------------------------------------------------------------------  CONCLUSIONS:  1. Mitral annular calcification, otherwise normal mitral  valve. Minimal mitral regurgitation.  2. Aortic valve leafletmorphology not well visualized.  Mild aortic regurgitation.  3. Endocardium not well visualized; grossly normal left  ventricular systolic function.  4. Mild diastolic dysfunction (Stage I).  5. Normal right ventricular size and function.  ------------------------------------------------------------    < end of copied text >          assessment:  pt stable.  Elevated BUN/Cr ratio probably reflects intravascular depletion, as reflected by orthostatic changes     Plan:   encourage PO fluids   D/C planning     Care Discussed with Consultants/Other Providers:

## 2019-04-16 NOTE — CHART NOTE - NSCHARTNOTEFT_GEN_A_CORE
Patient will be put back on telemetry. Unclear etiology for oxygen desaturation, possibly fluid overloaded, however CXR with improvement. Patient will need to be evaluated for oxygen at home, if does not correct. Case discussed with Dr. Jc.

## 2019-04-17 LAB
GLUCOSE BLDC GLUCOMTR-MCNC: 184 MG/DL — HIGH (ref 70–99)

## 2019-04-17 RX ORDER — SODIUM CHLORIDE 9 MG/ML
250 INJECTION INTRAMUSCULAR; INTRAVENOUS; SUBCUTANEOUS ONCE
Refills: 0 | Status: COMPLETED | OUTPATIENT
Start: 2019-04-17 | End: 2019-04-17

## 2019-04-17 RX ADMIN — Medication 0.25 MILLIGRAM(S): at 21:58

## 2019-04-17 RX ADMIN — Medication 3 MILLIGRAM(S): at 21:58

## 2019-04-17 RX ADMIN — SODIUM CHLORIDE 500 MILLILITER(S): 9 INJECTION INTRAMUSCULAR; INTRAVENOUS; SUBCUTANEOUS at 15:30

## 2019-04-17 NOTE — PROGRESS NOTE ADULT - SUBJECTIVE AND OBJECTIVE BOX
Patient is a 89y old  Male who presents with a chief complaint of Syncope and fall X5 yesterday (16 Apr 2019 09:07)      INTERVAL HPI/OVERNIGHT EVENTS: none     MEDICATIONS  (STANDING):  clonazePAM Oral Disintegrating Tablet 0.25 milliGRAM(s) Oral at bedtime  dextrose 5%. 1000 milliLiter(s) (50 mL/Hr) IV Continuous <Continuous>  dextrose 50% Injectable 12.5 Gram(s) IV Push once  dextrose 50% Injectable 25 Gram(s) IV Push once  dextrose 50% Injectable 25 Gram(s) IV Push once  heparin  Injectable 5000 Unit(s) SubCutaneous every 8 hours  insulin lispro (HumaLOG) corrective regimen sliding scale   SubCutaneous three times a day before meals  melatonin 3 milliGRAM(s) Oral <User Schedule>    MEDICATIONS  (PRN):  dextrose 40% Gel 15 Gram(s) Oral once PRN Blood Glucose LESS THAN 70 milliGRAM(s)/deciliter  glucagon  Injectable 1 milliGRAM(s) IntraMuscular once PRN Glucose LESS THAN 70 milligrams/deciliter        Orders last 24 hours:  Complete Blood Count: AM Sched. Collection: 17-Apr-2019 04:00 (04-16-19 @ 15:00)  Basic Metabolic Panel: AM Sched. Collection: 17-Apr-2019 04:00 (04-16-19 @ 15:00)      Allergies    Haldol (Other)    Intolerances    aspirin (Unknown)      REVIEW OF SYSTEMS:  CARDIOVASCULAR: No chest pain, palpitations, dizziness, or leg swelling; no shortness of breath     RESPIRATORY: No cough, wheezing, chills or hemoptysis; No shortness of breath    GASTROINTESTINAL: No abdominal or epigastric pain. No nausea, vomiting, or hematemesis; No diarrhea or constipation. No melena or hematochezia.    NEUROLOGICAL: No headaches, memory loss, loss of strength, numbness      PHYSICAL EXAM:  Vital Signs Last 24 Hrs  T(C): --  T(F): --  HR: --  BP: --  BP(mean): --  RR: --  SpO2: --    GENERAL: NAD, well-groomed, well-developed  HEAD:  Atraumatic, Normocephalic  EYES: EOMI, PERRLA, conjunctiva and sclera clear  NECK: Supple, No JVD, Normal thyroid  NERVOUS SYSTEM:  Alert & Oriented X3, Good concentration;  and symmetric  CHEST/LUNG: Clear to auscultation bilaterally; No rales, rhonchi, wheezing, or rubs  HEART: S1S2 regular, without murmur, rub nor gallop  ABDOMEN: Soft, Nontender, Nondistended; Bowel sounds present  EXTREMITIES:  2+ Peripheral Pulses, No clubbing, cyanosis, or edema      LABS:      Ca    9.5        16 Apr 2019 06:05        CAPILLARY BLOOD GLUCOSE ?          TELEMETRY: NSR             assessment:  check vitals.  D/C planning    If BP elevated, can try Diltizam.

## 2019-04-18 LAB
GLUCOSE BLDC GLUCOMTR-MCNC: 188 MG/DL — HIGH (ref 70–99)

## 2019-04-18 RX ORDER — CLONAZEPAM 1 MG
0.25 TABLET ORAL AT BEDTIME
Refills: 0 | Status: DISCONTINUED | OUTPATIENT
Start: 2019-04-18 | End: 2019-04-19

## 2019-04-18 RX ORDER — SODIUM CHLORIDE 9 MG/ML
1 INJECTION INTRAMUSCULAR; INTRAVENOUS; SUBCUTANEOUS
Refills: 0 | Status: DISCONTINUED | OUTPATIENT
Start: 2019-04-18 | End: 2019-04-19

## 2019-04-18 RX ORDER — SODIUM CHLORIDE 9 MG/ML
250 INJECTION INTRAMUSCULAR; INTRAVENOUS; SUBCUTANEOUS ONCE
Refills: 0 | Status: COMPLETED | OUTPATIENT
Start: 2019-04-18 | End: 2019-04-18

## 2019-04-18 RX ADMIN — Medication 3 MILLIGRAM(S): at 23:07

## 2019-04-18 RX ADMIN — SODIUM CHLORIDE 1 GRAM(S): 9 INJECTION INTRAMUSCULAR; INTRAVENOUS; SUBCUTANEOUS at 17:49

## 2019-04-18 RX ADMIN — SODIUM CHLORIDE 750 MILLILITER(S): 9 INJECTION INTRAMUSCULAR; INTRAVENOUS; SUBCUTANEOUS at 13:43

## 2019-04-18 RX ADMIN — Medication 0.25 MILLIGRAM(S): at 23:07

## 2019-04-18 NOTE — PROGRESS NOTE ADULT - SUBJECTIVE AND OBJECTIVE BOX
Patient is a 89y old  Male who presents with a chief complaint of Syncope and fall X5 yesterday (17 Apr 2019 09:17)      INTERVAL HPI/OVERNIGHT EVENTS: pt comfortable     MEDICATIONS  (STANDING):  clonazePAM Oral Disintegrating Tablet 0.25 milliGRAM(s) Oral at bedtime  dextrose 5%. 1000 milliLiter(s) (50 mL/Hr) IV Continuous <Continuous>  dextrose 50% Injectable 12.5 Gram(s) IV Push once  dextrose 50% Injectable 25 Gram(s) IV Push once  dextrose 50% Injectable 25 Gram(s) IV Push once  heparin  Injectable 5000 Unit(s) SubCutaneous every 8 hours  insulin lispro (HumaLOG) corrective regimen sliding scale   SubCutaneous three times a day before meals  melatonin 3 milliGRAM(s) Oral <User Schedule>  sodium chloride 1 Gram(s) Oral two times a day    MEDICATIONS  (PRN):  dextrose 40% Gel 15 Gram(s) Oral once PRN Blood Glucose LESS THAN 70 milliGRAM(s)/deciliter  glucagon  Injectable 1 milliGRAM(s) IntraMuscular once PRN Glucose LESS THAN 70 milligrams/deciliter        Orders last 24 hours:  Magnesium, Serum: AM Sched. Collection: 18-Apr-2019 04:00 (04-17-19 @ 10:43)  Complete Blood Count: AM Sched. Collection: 18-Apr-2019 04:00 (04-17-19 @ 10:43)  Basic Metabolic Panel: AM Sched. Collection: 18-Apr-2019 04:00 (04-17-19 @ 10:43)  sodium chloride 0.9% Bolus:   250 milliLiter(s), IV Bolus, once, infuse over 30 Minute(s), Stop After 1 Doses  Provider's Contact #: [phone] (04-17-19 @ 15:15)  POCT  Blood Glucose (04-17-19 @ 22:42)  sodium chloride:   1 Gram(s), Oral, two times a day  Provider's Contact #: [phone] (04-18-19 @ 08:55)          REVIEW OF SYSTEMS:  CARDIOVASCULAR: No chest pain, palpitations, dizziness, or leg swelling; no shortness of breath     RESPIRATORY: No cough, wheezing, chills or hemoptysis; No shortness of breath    GASTROINTESTINAL: No abdominal or epigastric pain. No nausea, vomiting, or hematemesis; No diarrhea or constipation. No melena or hematochezia.    NEUROLOGICAL: No headaches, memory loss, loss of strength, numbness      PHYSICAL EXAM:  Vital Signs Last 24 Hrs  T(C): 36.9 (18 Apr 2019 05:33), Max: 36.9 (18 Apr 2019 05:33)  T(F): 98.4 (18 Apr 2019 05:33), Max: 98.4 (18 Apr 2019 05:33)  HR: 72 (18 Apr 2019 05:33) (72 - 78)  BP: 152/64 (18 Apr 2019 05:33) (132/50 - 152/64)  BP(mean): --  RR: 18 (18 Apr 2019 05:33) (18 - 18)  SpO2: 98% (18 Apr 2019 05:33) (98% - 98%)    GENERAL: NAD, well-groomed, well-developed  HEAD:  Atraumatic, Normocephalic  EYES: EOMI, PERRLA, conjunctiva and sclera clear  NECK: Supple, No JVD, Normal thyroid  NERVOUS SYSTEM:  Alert & Oriented ; knows he is in hospital, year of 2020  CHEST/LUNG: Clear to auscultation bilaterally; No rales, rhonchi, wheezing, or rubs  HEART: S1S2 regular, without murmur, rub nor gallop  ABDOMEN: Soft, Nontender, Nondistended; Bowel sounds present  EXTREMITIES:  2+ Peripheral Pulses, No clubbing, cyanosis, or edema      LABS:            CAPILLARY BLOOD GLUCOSE      POCT Blood Glucose.: 184 mg/dL (17 Apr 2019 22:39)              assessment:  orthstatic hypotension.   PAF (remains in NSR)   Pt orthostatic yesterday, given IV bolus     Plan:   start PO salt tablets  D/C planning (see care coordinator note.   family did not reach me yesterday)    Care Discussed with Consultants/Other Providers: PA Patient is a 89y old  Male who presents with a chief complaint of Syncope and fall X5 yesterday (17 Apr 2019 09:17)      INTERVAL HPI/OVERNIGHT EVENTS: pt comfortable     MEDICATIONS  (STANDING):  clonazePAM Oral Disintegrating Tablet 0.25 milliGRAM(s) Oral at bedtime  dextrose 5%. 1000 milliLiter(s) (50 mL/Hr) IV Continuous <Continuous>  dextrose 50% Injectable 12.5 Gram(s) IV Push once  dextrose 50% Injectable 25 Gram(s) IV Push once  dextrose 50% Injectable 25 Gram(s) IV Push once  heparin  Injectable 5000 Unit(s) SubCutaneous every 8 hours  insulin lispro (HumaLOG) corrective regimen sliding scale   SubCutaneous three times a day before meals  melatonin 3 milliGRAM(s) Oral <User Schedule>  sodium chloride 1 Gram(s) Oral two times a day    MEDICATIONS  (PRN):  dextrose 40% Gel 15 Gram(s) Oral once PRN Blood Glucose LESS THAN 70 milliGRAM(s)/deciliter  glucagon  Injectable 1 milliGRAM(s) IntraMuscular once PRN Glucose LESS THAN 70 milligrams/deciliter        Orders last 24 hours:  Magnesium, Serum: AM Sched. Collection: 18-Apr-2019 04:00 (04-17-19 @ 10:43)  Complete Blood Count: AM Sched. Collection: 18-Apr-2019 04:00 (04-17-19 @ 10:43)  Basic Metabolic Panel: AM Sched. Collection: 18-Apr-2019 04:00 (04-17-19 @ 10:43)  sodium chloride 0.9% Bolus:   250 milliLiter(s), IV Bolus, once, infuse over 30 Minute(s), Stop After 1 Doses  Provider's Contact #: [phone] (04-17-19 @ 15:15)  POCT  Blood Glucose (04-17-19 @ 22:42)  sodium chloride:   1 Gram(s), Oral, two times a day  Provider's Contact #: [phone] (04-18-19 @ 08:55)          REVIEW OF SYSTEMS:  CARDIOVASCULAR: No chest pain, palpitations, dizziness, or leg swelling; no shortness of breath     RESPIRATORY: No cough, wheezing, chills or hemoptysis; No shortness of breath    GASTROINTESTINAL: No abdominal or epigastric pain. No nausea, vomiting, or hematemesis; No diarrhea or constipation. No melena or hematochezia.    NEUROLOGICAL: No headaches, memory loss, loss of strength, numbness      PHYSICAL EXAM:  Vital Signs Last 24 Hrs  T(C): 36.9 (18 Apr 2019 05:33), Max: 36.9 (18 Apr 2019 05:33)  T(F): 98.4 (18 Apr 2019 05:33), Max: 98.4 (18 Apr 2019 05:33)  HR: 72 (18 Apr 2019 05:33) (72 - 78)  BP: 152/64 (18 Apr 2019 05:33) (132/50 - 152/64)  BP(mean): --  RR: 18 (18 Apr 2019 05:33) (18 - 18)  SpO2: 98% (18 Apr 2019 05:33) (98% - 98%)    GENERAL: NAD, well-groomed, well-developed  HEAD:  Atraumatic, Normocephalic  EYES: EOMI, PERRLA, conjunctiva and sclera clear  NECK: Supple, No JVD, Normal thyroid  NERVOUS SYSTEM:  Alert & Oriented ; knows he is in hospital, year of 2020  CHEST/LUNG: Clear to auscultation bilaterally; No rales, rhonchi, wheezing, or rubs  HEART: S1S2 regular, without murmur, rub nor gallop  ABDOMEN: Soft, Nontender, Nondistended; Bowel sounds present  EXTREMITIES:  2+ Peripheral Pulses, No clubbing, cyanosis, or edema      LABS:            CAPILLARY BLOOD GLUCOSE      POCT Blood Glucose.: 184 mg/dL (17 Apr 2019 22:39)          pt may have BECCA due to dehydration, though admitting cr of 1.68 could represent ckd (baseline labs not available, mild improvement to 1.4 with hydration)    assessment:  orthstatic hypotension.   PAF (remains in NSR)   Pt orthostatic yesterday, given IV bolus     Plan:   start PO salt tablets  D/C planning (see care coordinator note.   family did not reach me yesterday)    Care Discussed with Consultants/Other Providers: PA

## 2019-04-19 ENCOUNTER — TRANSCRIPTION ENCOUNTER (OUTPATIENT)
Age: 84
End: 2019-04-19

## 2019-04-19 VITALS
OXYGEN SATURATION: 96 % | SYSTOLIC BLOOD PRESSURE: 131 MMHG | RESPIRATION RATE: 18 BRPM | DIASTOLIC BLOOD PRESSURE: 67 MMHG | TEMPERATURE: 98 F | HEART RATE: 74 BPM

## 2019-04-19 PROCEDURE — 99233 SBSQ HOSP IP/OBS HIGH 50: CPT

## 2019-04-19 RX ORDER — LANOLIN ALCOHOL/MO/W.PET/CERES
1 CREAM (GRAM) TOPICAL
Qty: 0 | Refills: 0 | DISCHARGE
Start: 2019-04-19

## 2019-04-19 RX ORDER — CLONAZEPAM 1 MG
1 TABLET ORAL
Qty: 0 | Refills: 0 | DISCHARGE
Start: 2019-04-19

## 2019-04-19 RX ORDER — SODIUM CHLORIDE 9 MG/ML
1 INJECTION INTRAMUSCULAR; INTRAVENOUS; SUBCUTANEOUS
Qty: 0 | Refills: 0 | DISCHARGE
Start: 2019-04-19

## 2019-04-19 NOTE — PROGRESS NOTE BEHAVIORAL HEALTH - NSBHCONSFOLLOWNEEDS_PSY_A_CORE
Patient needs further psychiatric safety assessment prior to discharge
Patient needs further psychiatric safety assessment prior to discharge
no psychiatric contraindications to discharge
Patient needs further psychiatric safety assessment prior to discharge
Patient needs further psychiatric safety assessment prior to discharge

## 2019-04-19 NOTE — PROGRESS NOTE BEHAVIORAL HEALTH - NSBHCHARTREVIEWVS_PSY_A_CORE FT
Vital Signs Last 24 Hrs  T(C): 36.9 (15 Apr 2019 06:16), Max: 36.9 (15 Apr 2019 06:16)  T(F): 98.5 (15 Apr 2019 06:16), Max: 98.5 (15 Apr 2019 06:16)  HR: 85 (15 Apr 2019 11:00) (75 - 85)  BP: 107/70 (15 Apr 2019 06:16) (107/70 - 153/84)  BP(mean): --  RR: 18 (15 Apr 2019 11:00) (16 - 18)  SpO2: 100% (15 Apr 2019 11:00) (100% - 100%)
Vital Signs Last 24 Hrs  T(C): 36.8 (19 Apr 2019 12:02), Max: 36.9 (18 Apr 2019 13:37)  T(F): 98.2 (19 Apr 2019 12:02), Max: 98.5 (18 Apr 2019 13:37)  HR: 74 (19 Apr 2019 12:02) (74 - 74)  BP: 131/67 (19 Apr 2019 12:02) (131/67 - 131/67)  BP(mean): --  RR: 18 (19 Apr 2019 12:02) (15 - 18)  SpO2: 96% (19 Apr 2019 12:02) (96% - 99%)
Vital Signs Last 24 Hrs  T(C): 36.8 (12 Apr 2019 04:51), Max: 36.8 (12 Apr 2019 04:51)  T(F): 98.3 (12 Apr 2019 04:51), Max: 98.3 (12 Apr 2019 04:51)  HR: 86 (12 Apr 2019 04:51) (82 - 86)  BP: 142/85 (12 Apr 2019 04:51) (142/85 - 148/90)  BP(mean): --  RR: 18 (12 Apr 2019 04:51) (18 - 18)  SpO2: 99% (12 Apr 2019 04:51) (99% - 99%)
ICU Vital Signs Last 24 Hrs  T(C): 36.8 (10 Apr 2019 04:45), Max: 36.9 (09 Apr 2019 21:00)  T(F): 98.2 (10 Apr 2019 04:45), Max: 98.4 (09 Apr 2019 21:00)  HR: 87 (10 Apr 2019 04:45) (81 - 98)  BP: 152/92 (10 Apr 2019 04:45) (152/92 - 199/74)  BP(mean): --  ABP: --  ABP(mean): --  RR: 18 (10 Apr 2019 04:45) (18 - 18)  SpO2: 100% (10 Apr 2019 04:45) (100% - 100%)

## 2019-04-19 NOTE — DISCHARGE NOTE NURSING/CASE MANAGEMENT/SOCIAL WORK - NSDCDPATPORTLINK_GEN_ALL_CORE
You can access the [facility] Patient Portal, offered by [facility], by registering with the following website: [email or web address]

## 2019-04-19 NOTE — DISCHARGE NOTE PROVIDER - HOSPITAL COURSE
89 y/o M with Pmhx of Dementia x5 years, DM II, Macular degeneration, Prostate CA (s/p Radiation seeding on remission for past 25 years), HTN, h/o GIB was brought to ED by EMS for syncope and fall X 5 that happened yesterday, last episode in the afternoon around 1pm. Pt is agitated and unable to provide history, therefore collateral information was obtained from wife (Mary [phone]). Per wife, pt fell 5x, at 4:00am, 4:10, 11:00am, 11:30am, 1:pm time frame. Per wife, at 1pm pt fell and hit back of his head with LOC about 5-10 sec ,does not recall any urinary incontinence and denies any seizure activity. Per wife, pt had repeated falls for past months, with 8x being the most falls in 1 day. Per wife, pt has fluctuating blood pressure throughout the day ( with highest being 185/86 and lowest being 100/50 and fingerstick ranges from (189-300). Per wife, admits pt is adequately hydrated and nourished, but admits to poor quality of life due to worsening dementia. Per wife, noticed  decline in memory, paranoid at times, agitated, and worsening dementia for past 6-8months. Per wife, admits  was recently admitted to [facility] for agitation, and was given Haldol, but had side effect to medication , which he had right sided facial paralysis for 10 hours, now resolved. Per wife, with her best judgment in past 24hrs, pt did not endorse nausea, vomiting, fever, chills, abdominal pain, shortness of breath. On admission, pt is agitated and not alert to time and place and situation.         CT Head and Cervical spine: Head CT: No evidence for intracranial hemorrhage, mass effect, or displaced calvarial fracture. Cervical spine CT: No evidence for acute displaced fracture or traumatic malalignment. Cervical degenerative spondylosis, as described above.     Multiple thyroid nodules as described above.        CXR: Clear lungs         Psych: Consulted for delirium/dementia. Much better in terms of restlessness and general attitude.  Jovial, funny, engaged very pleasantly with MD.  Confused, disoriented, confabulating.  No a/vh or paranoia.  No statements of si or hi. No distress. Continue Klonopin 0.25mg q HS PO- hold for rr<12. Family not consenting to standing antipsychotic use at this point. Will hold off in light of akathisia regardless. ACUTE AGGRESSION- Zyprexa 1.25mg q 6hrs prn im. no psychiatric contraindications to discharge.         Internal Medicine attending: Plan: continue salt tablets in light of orthostatic hypotension. D/C planning.        Case reviewed with attending who cleared for discharge. Plan discussed with patient's family who agreed with plan of care.

## 2019-04-19 NOTE — PROGRESS NOTE ADULT - SUBJECTIVE AND OBJECTIVE BOX
Patient is a 89y old  Male who presents with a chief complaint of Syncope and fall X5 yesterday (18 Apr 2019 09:18)      INTERVAL HPI/OVERNIGHT EVENTS: none.  confused     MEDICATIONS  (STANDING):  clonazePAM Oral Disintegrating Tablet 0.25 milliGRAM(s) Oral at bedtime  dextrose 5%. 1000 milliLiter(s) (50 mL/Hr) IV Continuous <Continuous>  dextrose 50% Injectable 12.5 Gram(s) IV Push once  dextrose 50% Injectable 25 Gram(s) IV Push once  dextrose 50% Injectable 25 Gram(s) IV Push once  heparin  Injectable 5000 Unit(s) SubCutaneous every 8 hours  insulin lispro (HumaLOG) corrective regimen sliding scale   SubCutaneous three times a day before meals  melatonin 3 milliGRAM(s) Oral <User Schedule>  sodium chloride 1 Gram(s) Oral two times a day    MEDICATIONS  (PRN):  dextrose 40% Gel 15 Gram(s) Oral once PRN Blood Glucose LESS THAN 70 milliGRAM(s)/deciliter  glucagon  Injectable 1 milliGRAM(s) IntraMuscular once PRN Glucose LESS THAN 70 milligrams/deciliter        Orders last 24 hours:  sodium chloride:   1 Gram(s), Oral, two times a day  Provider's Contact #: [phone] (04-18-19 @ 08:55)  Basic Metabolic Panel: AM Sched. Collection: 19-Apr-2019 04:00 (04-18-19 @ 10:13)  Complete Blood Count + Automated Diff: AM Sched. Collection: 19-Apr-2019 04:00 (04-18-19 @ 10:13)  Magnesium, Serum: AM Sched. Collection: 19-Apr-2019 04:00 (04-18-19 @ 10:13)  sodium chloride 0.9% Bolus:   250 milliLiter(s), IV Bolus, once, infuse over 20 Minute(s), Stop After 1 Doses  Provider's Contact #: [phone] (04-18-19 @ 13:41)  clonazePAM Oral Disintegrating Tablet: [Ordered as KLONOPIN WAFER]  0.25 milliGRAM(s), Oral, at bedtime  Special Instructions: Per psychiatry  HOLD FOR SEDATION or RR<12  Administration Instructions: Do NOT swallow tablet.  Place on tongue and allow to dissolve completely.  This is a Look-alike/Sound-alike Medication  Provider's Contact #: [phone] (04-18-19 @ 17:14)  POCT  Blood Glucose (04-18-19 @ 22:27)      Allergies    Haldol (Other)    Intolerances    aspirin (Unknown)      REVIEW OF SYSTEMS:  CARDIOVASCULAR: No chest pain, palpitations, dizziness, or leg swelling; no shortness of breath     RESPIRATORY: No cough, wheezing, chills or hemoptysis; No shortness of breath    GASTROINTESTINAL: No abdominal or epigastric pain. No nausea, vomiting, or hematemesis; No diarrhea or constipation. No melena or hematochezia.    NEUROLOGICAL: No headaches, memory loss, loss of strength, numbness      PHYSICAL EXAM:  Vital Signs Last 24 Hrs  T(C): 36.9 (18 Apr 2019 13:37), Max: 36.9 (18 Apr 2019 13:37)  T(F): 98.5 (18 Apr 2019 13:37), Max: 98.5 (18 Apr 2019 13:37)  HR: --  BP: --  BP(mean): --  RR: 15 (18 Apr 2019 13:37) (15 - 15)  SpO2: 99% (18 Apr 2019 13:37) (99% - 99%)    GENERAL: NAD, well-groomed, well-developed  HEAD:  Atraumatic, Normocephalic  EYES: EOMI, PERRLA, conjunctiva and sclera clear  NECK: Supple, No JVD, Normal thyroid  NERVOUS SYSTEM:  Alert & Oriented X3, Good concentration;  and symmetric  CHEST/LUNG: Clear to auscultation bilaterally; No rales, rhonchi, wheezing, or rubs  HEART: S1S2 regular, without murmur, rub nor gallop  ABDOMEN: Soft, Nontender, Nondistended; Bowel sounds present  EXTREMITIES:  2+ Peripheral Pulses, No clubbing, cyanosis, or edema      LABS:            POCT Blood Glucose.: 188 mg/dL (18 Apr 2019 22:26)      TELEMETRY: NSR         assessment:  orthstatic hypotension (orthstatic yesterday)       Plan:   continue salt tablets.  D/C planning     Care Discussed with Consultants/Other Providers:

## 2019-04-19 NOTE — DISCHARGE NOTE PROVIDER - NSDCCPCAREPLAN_GEN_ALL_CORE_FT
PRINCIPAL DISCHARGE DIAGNOSIS  Diagnosis: Dementia  Assessment and Plan of Treatment: Patient had behavioral assessment, no contraindications to discharge by psychiatry. Patient calm and redirectable. Clear for discharge to [place].      SECONDARY DISCHARGE DIAGNOSES  Diagnosis: Syncope and collapse  Assessment and Plan of Treatment: Patient had positive orthostasis. Fluids were administered and was started on salt tabs. Patient no longer symptomatic on standing. No further need for inpatient workup.

## 2019-04-19 NOTE — DISCHARGE NOTE PROVIDER - CARE PROVIDER_API CALL
Juan Galeano)  Internal Medicine  [address] [place] [address]  Phone: [phone]  Fax: [phone]  Follow Up Time: 2 weeks

## 2019-04-19 NOTE — PROGRESS NOTE BEHAVIORAL HEALTH - NSBHADMITCOUNSEL_PSY_A_CORE
risks and benefits of treatment options/instructions for management, treatment and follow up/importance of adherence to chosen treatment/risk factor reduction
risks and benefits of treatment options/instructions for management, treatment and follow up/importance of adherence to chosen treatment/risk factor reduction/client/family/caregiver education
diagnostic results/impressions and/or recommended studies/risks and benefits of treatment options/instructions for management, treatment and follow up/importance of adherence to chosen treatment/risk factor reduction/client/family/caregiver education/prognosis
instructions for management, treatment and follow up/importance of adherence to chosen treatment/client/family/caregiver education
instructions for management, treatment and follow up/importance of adherence to chosen treatment/risk factor reduction

## 2019-04-19 NOTE — PROGRESS NOTE BEHAVIORAL HEALTH - RISK ASSESSMENT
older male, dementia, restless, agitation.
older male, dementia, restless, agitation.
older male, dementia, restless, agitation

## 2019-04-19 NOTE — PROGRESS NOTE BEHAVIORAL HEALTH - NSBHATTESTSEENBY_PSY_A_CORE
attending Psychiatrist without NP/Trainee
attending Psychiatrist without NP/Trainee
Attending Psychiatrist supervising NP/Trainee, meeting pt...
attending Psychiatrist without NP/Trainee
Attending Psychiatrist supervising NP/Trainee, meeting pt...

## 2019-04-19 NOTE — PROGRESS NOTE BEHAVIORAL HEALTH - NSBHFUPINTERVALHXFT_PSY_A_CORE
Met with the patient.  Much better in terms of restlessness and general attitude.  Jovial, funny, engaged very pleasantly with MD.  Confused, disoriented, confabulating.  No a/vh or paranoia.  No distress. Met with the patient.  Much better in terms of restlessness and general attitude.  Jovial, funny, engaged very pleasantly with MD.  Confused, disoriented, confabulating.  No a/vh or paranoia.  No statements of si or hi. No distress.

## 2019-04-19 NOTE — PROGRESS NOTE BEHAVIORAL HEALTH - NSBHADMITCOORDWITH_PSY_A_CORE
medical staff
medical staff/family/Caregiver
medical staff/family/Caregiver
medical staff
medical staff

## 2019-04-19 NOTE — PROGRESS NOTE BEHAVIORAL HEALTH - NSBHFUPREASONCONS_PSY_A_CORE
agitation/med management
delerium/dementia
dementia/agitation/med management

## 2019-04-19 NOTE — PROGRESS NOTE ADULT - PROVIDER SPECIALTY LIST ADULT
Internal Medicine

## 2019-04-19 NOTE — DISCHARGE NOTE NURSING/CASE MANAGEMENT/SOCIAL WORK - NSDCCRNAME_GEN_ALL_CORE_FT
[facility]  [address] [place] [address], [place] [address]

## 2019-04-19 NOTE — PROGRESS NOTE BEHAVIORAL HEALTH - SUMMARY
Summary (include case differential, formulation and patient response to therapy): 89 yr old M with hx of dementia, DM, Macular Degeneration, Prostate CA admitted to [facility] with recurrent falls and syncope.  Found to be in atrial fibrillation on telemetry.  At baseline pt requires help with all iADLs and some ADLs.  Is likely unable to be left unsupervised for long periods of time and does have episodes of agitation/aggression.  Psychiatry consulted for dementia with behavioural symptoms.     4/12= Increasingly restless, purposeless movements in bed. Multiple antipsychotic prn needs in the last 24 hours- likely akathisia.  4/15- much improved  4/19- continues to remain much improved.    Recommendations:  -Enhanced supervision  -Continue redirection and reorientation as needed  -Cardiac clearance for use for antipsychotic medications bea in light of new Afib.  - Ensure hydration.   - Ensure patient is not constipated.   - Continue Klonopin 0.25mg q HS PO- hold for rr<12.   - Family not consenting to standing antipsychotic use at this point. Will hold off in light of akathisia regardless.      - ACUTE AGGRESSION- Zyprexa 1.25mg q 6hrs prn im.

## 2019-04-19 NOTE — PROGRESS NOTE ADULT - REASON FOR ADMISSION
Syncope and fall X5 yesterday

## 2019-05-09 PROBLEM — Z00.00 ENCOUNTER FOR PREVENTIVE HEALTH EXAMINATION: Status: ACTIVE | Noted: 2019-05-09

## 2019-08-07 NOTE — PROGRESS NOTE BEHAVIORAL HEALTH - ABNORMAL MOVEMENTS
No abnormal movements
H/O breast augmentation    No significant past surgical history
No abnormal movements
No abnormal movements

## 2019-08-11 ENCOUNTER — EMERGENCY (EMERGENCY)
Facility: HOSPITAL | Age: 84
LOS: 1 days | Discharge: HOME | End: 2019-08-11
Attending: STUDENT IN AN ORGANIZED HEALTH CARE EDUCATION/TRAINING PROGRAM
Payer: MEDICARE

## 2019-08-11 VITALS
SYSTOLIC BLOOD PRESSURE: 110 MMHG | DIASTOLIC BLOOD PRESSURE: 70 MMHG | RESPIRATION RATE: 20 BRPM | HEART RATE: 99 BPM | TEMPERATURE: 98 F | OXYGEN SATURATION: 100 %

## 2019-08-11 LAB
ALBUMIN SERPL ELPH-MCNC: 3.4 G/DL — SIGNIFICANT CHANGE UP (ref 3.3–5)
ALP SERPL-CCNC: 64 U/L — SIGNIFICANT CHANGE UP (ref 40–120)
ALT FLD-CCNC: 31 U/L — SIGNIFICANT CHANGE UP (ref 10–45)
ANION GAP SERPL CALC-SCNC: 12 MMOL/L — SIGNIFICANT CHANGE UP (ref 5–17)
APTT BLD: 28.2 SEC — SIGNIFICANT CHANGE UP (ref 27.5–36.3)
AST SERPL-CCNC: 28 U/L — SIGNIFICANT CHANGE UP (ref 10–40)
BASOPHILS # BLD AUTO: 0 K/UL — SIGNIFICANT CHANGE UP (ref 0–0.2)
BASOPHILS NFR BLD AUTO: 0.2 % — SIGNIFICANT CHANGE UP (ref 0–2)
BILIRUB SERPL-MCNC: 0.5 MG/DL — SIGNIFICANT CHANGE UP (ref 0.2–1.2)
BUN SERPL-MCNC: 40 MG/DL — HIGH (ref 7–23)
CALCIUM SERPL-MCNC: 9.3 MG/DL — SIGNIFICANT CHANGE UP (ref 8.4–10.5)
CHLORIDE SERPL-SCNC: 101 MMOL/L — SIGNIFICANT CHANGE UP (ref 96–108)
CO2 SERPL-SCNC: 20 MMOL/L — LOW (ref 22–31)
CREAT SERPL-MCNC: 1.65 MG/DL — HIGH (ref 0.5–1.3)
EOSINOPHIL # BLD AUTO: 0.1 K/UL — SIGNIFICANT CHANGE UP (ref 0–0.5)
EOSINOPHIL NFR BLD AUTO: 0.5 % — SIGNIFICANT CHANGE UP (ref 0–6)
GAS PNL BLDV: SIGNIFICANT CHANGE UP
GLUCOSE SERPL-MCNC: 266 MG/DL — HIGH (ref 70–99)
HCT VFR BLD CALC: 33.8 % — LOW (ref 39–50)
HGB BLD-MCNC: 11.5 G/DL — LOW (ref 13–17)
INR BLD: 1.09 RATIO — SIGNIFICANT CHANGE UP (ref 0.88–1.16)
LIDOCAIN IGE QN: 37 U/L — SIGNIFICANT CHANGE UP (ref 7–60)
LYMPHOCYTES # BLD AUTO: 0.8 K/UL — LOW (ref 1–3.3)
LYMPHOCYTES # BLD AUTO: 6.9 % — LOW (ref 13–44)
MCHC RBC-ENTMCNC: 32.3 PG — SIGNIFICANT CHANGE UP (ref 27–34)
MCHC RBC-ENTMCNC: 33.9 GM/DL — SIGNIFICANT CHANGE UP (ref 32–36)
MCV RBC AUTO: 95.2 FL — SIGNIFICANT CHANGE UP (ref 80–100)
MONOCYTES # BLD AUTO: 0.8 K/UL — SIGNIFICANT CHANGE UP (ref 0–0.9)
MONOCYTES NFR BLD AUTO: 6.6 % — SIGNIFICANT CHANGE UP (ref 2–14)
NEUTROPHILS # BLD AUTO: 9.9 K/UL — HIGH (ref 1.8–7.4)
NEUTROPHILS NFR BLD AUTO: 85.8 % — HIGH (ref 43–77)
PLATELET # BLD AUTO: 207 K/UL — SIGNIFICANT CHANGE UP (ref 150–400)
POTASSIUM SERPL-MCNC: 4.7 MMOL/L — SIGNIFICANT CHANGE UP (ref 3.5–5.3)
POTASSIUM SERPL-SCNC: 4.7 MMOL/L — SIGNIFICANT CHANGE UP (ref 3.5–5.3)
PROT SERPL-MCNC: 6.1 G/DL — SIGNIFICANT CHANGE UP (ref 6–8.3)
PROTHROM AB SERPL-ACNC: 12.5 SEC — SIGNIFICANT CHANGE UP (ref 10–12.9)
RBC # BLD: 3.55 M/UL — LOW (ref 4.2–5.8)
RBC # FLD: 12.3 % — SIGNIFICANT CHANGE UP (ref 10.3–14.5)
SODIUM SERPL-SCNC: 133 MMOL/L — LOW (ref 135–145)
WBC # BLD: 11.5 K/UL — HIGH (ref 3.8–10.5)
WBC # FLD AUTO: 11.5 K/UL — HIGH (ref 3.8–10.5)

## 2019-08-11 PROCEDURE — 99284 EMERGENCY DEPT VISIT MOD MDM: CPT | Mod: GC

## 2019-08-11 PROCEDURE — 70450 CT HEAD/BRAIN W/O DYE: CPT | Mod: 26

## 2019-08-11 PROCEDURE — 74177 CT ABD & PELVIS W/CONTRAST: CPT | Mod: 26

## 2019-08-11 PROCEDURE — 72125 CT NECK SPINE W/O DYE: CPT | Mod: 26

## 2019-08-11 PROCEDURE — 71260 CT THORAX DX C+: CPT | Mod: 26

## 2019-08-11 RX ORDER — SODIUM CHLORIDE 9 MG/ML
1000 INJECTION INTRAMUSCULAR; INTRAVENOUS; SUBCUTANEOUS ONCE
Refills: 0 | Status: COMPLETED | OUTPATIENT
Start: 2019-08-11 | End: 2019-08-11

## 2019-08-11 RX ADMIN — SODIUM CHLORIDE 1000 MILLILITER(S): 9 INJECTION INTRAMUSCULAR; INTRAVENOUS; SUBCUTANEOUS at 23:10

## 2019-08-11 NOTE — CONSULT NOTE ADULT - SUBJECTIVE AND OBJECTIVE BOX
TRAUMA SERVICE (Acute Care Surgery / ACS - #9030) - CONSULT NOTE  --------------------------------------------------------------------------------------------    TRAUMA ACTIVATION LEVEL: 1    MECHANISM OF INJURY:      [x] Blunt  	[] MVC	[x] Fall	[] Pedestrian Struck	[] Motorcycle accident      [] Penetrating  	[] Gun Shot Wound 		[] Stab Wound    GCS: 	E: 2	V: 5	M: 6    Patient is a 89y old  Male who presents with a chief complaint of being found down at his assisted living. He responds to his name but does not is confused by other questions. He follows commands. Unknown med/surg history    Primary Survey:    A - airway intact  B - bilateral breath sounds and good chest rise  C - initial BP  SBP: 110 , HR HR: 80s , palpable pulses in all extremities  D - GCS 13-14 on arrival  Exposure obtained      Secondary Survey  General: NAD  HEENT: Normocephalic, EOMI, PEERLA. small abrasions on left nostril   Neck: Soft, midline trachea.  Chest: No chest wall tenderness.   Cardiac: S1, S2, RRR  Respiratory: Bilateral breath sounds, clear and equal bilaterally  Abdomen: Soft, non-distended, non-tender, no rebound, no guarding, no masses palpated  Groin: Normal appearing  Ext: palp radial b/l UE, b/l DP palp in Lower Extrem, motor and sensory grossly intact in all 4 extremities  Back: no TTP, no palpable runoff/stepoff/deformity  Rectal: No kaiser bloode    Patient denies fevers/chills, denies lightheadedness/dizziness, denies SOB/chest pain, denies nausea/vomiting, denies constipation/diarrhea.  ***    ROS: 10-system review is otherwise negative except HPI above.      PAST MEDICAL & SURGICAL HISTORY:    FAMILY HISTORY:    [] Family history not pertinent as reviewed with the patient and family    SOCIAL HISTORY:  unknown    ALLERGIES: Haldol (Unknown)      HOME MEDICATIONS: unknown    CURRENT MEDICATIONS  MEDICATIONS (STANDING): sodium chloride 0.9% Bolus 1000 milliLiter(s) IV Bolus once    MEDICATIONS (PRN):  --------------------------------------------------------------------------------------------    Vitals:   T(C): 36.6 (08-11-19 @ 22:55), Max: 36.6 (08-11-19 @ 22:55)  HR: --  BP: --  RR: --  SpO2: --  CAPILLARY BLOOD GLUCOSE        CAPILLARY BLOOD GLUCOSE              PHYSICAL EXAM:  General: NAD  HEENT: Normocephalic, EOMI, PEERLA. small abrasions on left nostril   Neck: Soft, midline trachea.  Chest: No chest wall tenderness.   Cardiac: S1, S2, RRR  Respiratory: Bilateral breath sounds, clear and equal bilaterally  Abdomen: Soft, non-distended, non-tender, no rebound, no guarding, no masses palpated  Groin: Normal appearing  Ext: palp radial b/l UE, b/l DP palp in Lower Extrem, motor and sensory grossly intact in all 4 extremities  Back: no TTP, no palpable runoff/stepoff/deformity  Rectal: No kaiser blood  --------------------------------------------------------------------------------------------    LABS  Pending     --------------------------------------------------------------------------------------------    MICROBIOLOGY      --------------------------------------------------------------------------------------------    IMAGING  Pending  --------------------------------------------------------------------------------------------    ASSESSMENT: Patient is a 89y old m s/p being found down with unknown mechanism    - F/u imaging findings  - Seen and examined with Dr. Lozano

## 2019-08-11 NOTE — ED ADULT NURSE NOTE - OBJECTIVE STATEMENT
89 YO male with PMH dementia, orthostatic hypotension, and DMII, via EMS from assisted living, presenting with complaints of altered mental status. As per EMS, pt was found on floor at nursing facility. Pt was confused and altered, therefore pt was provided 5 mg Versed IV twice. Pt was calm upon arrival, and responding to pain verbally. Level 1 trauma initiated on patient, c-collar placed on patient.   Pt Axox1 gross neuro intact, PERRL 3 mm, GCS 13-14. Small abrasion to the left nostril. Lungs clear throughout bilateral. S1S2 heard. Abdomen soft, non-tender, non-distended. Skin warm, dry, and intact. Pt placed in position of comfort. Pt educated on call bell system and provided call bell. Bed in lowest position, wheels locked, appropriate side rails raised. Pt denies needs at this time.

## 2019-08-12 LAB
APPEARANCE UR: CLEAR — SIGNIFICANT CHANGE UP
BILIRUB UR-MCNC: NEGATIVE — SIGNIFICANT CHANGE UP
BLD GP AB SCN SERPL QL: NEGATIVE — SIGNIFICANT CHANGE UP
COLOR SPEC: COLORLESS — SIGNIFICANT CHANGE UP
DIFF PNL FLD: NEGATIVE — SIGNIFICANT CHANGE UP
GLUCOSE UR QL: ABNORMAL
KETONES UR-MCNC: NEGATIVE — SIGNIFICANT CHANGE UP
LEUKOCYTE ESTERASE UR-ACNC: NEGATIVE — SIGNIFICANT CHANGE UP
NITRITE UR-MCNC: NEGATIVE — SIGNIFICANT CHANGE UP
PH UR: 6.5 — SIGNIFICANT CHANGE UP (ref 5–8)
PROT UR-MCNC: SIGNIFICANT CHANGE UP
RH IG SCN BLD-IMP: POSITIVE — SIGNIFICANT CHANGE UP
SP GR SPEC: 1.01 — SIGNIFICANT CHANGE UP (ref 1.01–1.02)
UROBILINOGEN FLD QL: NEGATIVE — SIGNIFICANT CHANGE UP

## 2019-08-12 PROCEDURE — 80053 COMPREHEN METABOLIC PANEL: CPT

## 2019-08-12 PROCEDURE — 85610 PROTHROMBIN TIME: CPT

## 2019-08-12 PROCEDURE — 86850 RBC ANTIBODY SCREEN: CPT

## 2019-08-12 PROCEDURE — 71260 CT THORAX DX C+: CPT

## 2019-08-12 PROCEDURE — 85027 COMPLETE CBC AUTOMATED: CPT

## 2019-08-12 PROCEDURE — 82435 ASSAY OF BLOOD CHLORIDE: CPT

## 2019-08-12 PROCEDURE — 83690 ASSAY OF LIPASE: CPT

## 2019-08-12 PROCEDURE — 81003 URINALYSIS AUTO W/O SCOPE: CPT

## 2019-08-12 PROCEDURE — 83605 ASSAY OF LACTIC ACID: CPT

## 2019-08-12 PROCEDURE — 85730 THROMBOPLASTIN TIME PARTIAL: CPT

## 2019-08-12 PROCEDURE — 85014 HEMATOCRIT: CPT

## 2019-08-12 PROCEDURE — 72125 CT NECK SPINE W/O DYE: CPT

## 2019-08-12 PROCEDURE — 82803 BLOOD GASES ANY COMBINATION: CPT

## 2019-08-12 PROCEDURE — 74177 CT ABD & PELVIS W/CONTRAST: CPT

## 2019-08-12 PROCEDURE — 82947 ASSAY GLUCOSE BLOOD QUANT: CPT

## 2019-08-12 PROCEDURE — 84295 ASSAY OF SERUM SODIUM: CPT

## 2019-08-12 PROCEDURE — 99284 EMERGENCY DEPT VISIT MOD MDM: CPT | Mod: 25

## 2019-08-12 PROCEDURE — 84132 ASSAY OF SERUM POTASSIUM: CPT

## 2019-08-12 PROCEDURE — 86900 BLOOD TYPING SEROLOGIC ABO: CPT

## 2019-08-12 PROCEDURE — 86901 BLOOD TYPING SEROLOGIC RH(D): CPT

## 2019-08-12 PROCEDURE — 82330 ASSAY OF CALCIUM: CPT

## 2019-08-12 PROCEDURE — 70450 CT HEAD/BRAIN W/O DYE: CPT

## 2019-08-12 RX ORDER — CLONAZEPAM 1 MG
0.5 TABLET ORAL ONCE
Refills: 0 | Status: DISCONTINUED | OUTPATIENT
Start: 2019-08-12 | End: 2019-08-12

## 2019-08-12 NOTE — ED ADULT NURSE REASSESSMENT NOTE - NS ED NURSE REASSESS COMMENT FT1
Pt repeatedly attempting to climb out of stretcher. Fall risk precautions attempted to patient (pt refusing to wear red fall risk socks). Pt placed in hallway near nursing station for patient's safety. PT repeatedly yelling to "turn off lights," pt educated that lights cannot be turned off for safety. Pt placed in position of comfort. Pt educated on call bell system and provided call bell. Bed in lowest position, wheels locked, appropriate side rails raised. Pt denies needs at this time.

## 2019-08-12 NOTE — ED PROVIDER NOTE - NSFOLLOWUPINSTRUCTIONS_ED_ALL_ED_FT
Follow up with your PMD within 48 hours.  Of note your CT scan did show parotid abnormalities that should be followed up with your primary doctor.   Return to the ER if any concerning symptoms arise.

## 2019-08-12 NOTE — ED PROVIDER NOTE - ATTENDING CONTRIBUTION TO CARE
I performed a history and physical exam of the patient and discussed their management with the resident.  I reviewed the resident's note and agree with the documented findings and plan of care except as noted below. My medical decision making and observations are as follows:    89M pmh dementia, dm, orthostatic hypotension BIBEMS after being found on ground.  He was reportedly confused and agitated with EMS, given 5mg versed.  Pt arrives calm, alert and following commands, however hypotensive.  Level 1 trauma activated and patient placed in c-collar.  Concern for possible traumatic injury, also possible infectious or metabolic cause.  Will obtain labs, CTs, give fluids and reassess.

## 2019-08-12 NOTE — ED PROVIDER NOTE - PROGRESS NOTE DETAILS
Pt's wife and daughter at bedside.  They state thiese episodes happen to patient very frequently due to orthostatic hypotension and patient was not actually altered from baseling when EMS arrived.  Family states they did not want patient taken to the hospital, but EMS and PD insisted.  Pt has been frequently taken to the hospital for similar episodes.  Family is filling out [facility] form and do not want the patient admitted.  They understand I must follow up with the imaging and labs before he can be released.  If no injuries on imaging and labs without major abnormalities, patient will be ambulated and non emergent transport set up to return to assisted living.  [facility] states DNR/DNI do not hospitalize.  - Vicki Brizuela, DO discussed results with pts daughter Yasmine over the phone. Arranging nonemergent transport for pt back to his assisted living facility. Discussed visit with nurse at facility. CT scan does note goiter which per daughter is chronic, and parotid nodules which daughter was not aware of. Pt will follow with PMD for further work up.

## 2019-08-12 NOTE — ED PROVIDER NOTE - CLINICAL SUMMARY MEDICAL DECISION MAKING FREE TEXT BOX
89M pmh dementia, dm, orthostatic hypotension presents after being found on ground. s/p versed in field. Level 1 trauma called as pt found down, initially hypotensive and altered. Will pan scan, check labs and d/c home per family's wishes.

## 2019-08-12 NOTE — ED PROVIDER NOTE - MUSCULOSKELETAL, MLM
Spine appears normal, range of motion is not limited, no muscle or joint tenderness. No deformities noted

## 2019-08-12 NOTE — ED ADULT NURSE REASSESSMENT NOTE - NS ED NURSE REASSESS COMMENT FT1
Pt family repeatedly denies EKG at bedside. Pt family verbalizing understanding of risks of unidentified EKG changes. Pt placed in position of comfort. Pt educated on call bell system and provided call bell. Bed in lowest position, wheels locked, appropriate side rails raised. Pt denies needs at this time.

## 2019-08-12 NOTE — ED PROVIDER NOTE - OBJECTIVE STATEMENT
89M pmh dementia, dm, orthostatic hypotension BIBEMS after being found on ground.  He was reportedly confused and agitated with EMS, given 5mg versed.  Pt arrives calm, alert and following commands, however hypotensive.  Level 1 trauma activated and patient placed in c-collar.

## 2019-08-14 ENCOUNTER — EMERGENCY (EMERGENCY)
Facility: HOSPITAL | Age: 84
LOS: 1 days | Discharge: HOME | End: 2019-08-14
Attending: EMERGENCY MEDICINE
Payer: MEDICARE

## 2019-08-14 VITALS
TEMPERATURE: 98 F | RESPIRATION RATE: 18 BRPM | HEART RATE: 79 BPM | DIASTOLIC BLOOD PRESSURE: 87 MMHG | SYSTOLIC BLOOD PRESSURE: 133 MMHG | WEIGHT: 130.07 LBS | OXYGEN SATURATION: 98 %

## 2019-08-14 LAB
ALBUMIN SERPL ELPH-MCNC: 4.4 G/DL — SIGNIFICANT CHANGE UP (ref 3.3–5)
ALP SERPL-CCNC: 79 U/L — SIGNIFICANT CHANGE UP (ref 40–120)
ALT FLD-CCNC: 38 U/L — SIGNIFICANT CHANGE UP (ref 10–45)
ANION GAP SERPL CALC-SCNC: 14 MMOL/L — SIGNIFICANT CHANGE UP (ref 5–17)
APTT BLD: 29.4 SEC — SIGNIFICANT CHANGE UP (ref 27.5–36.3)
AST SERPL-CCNC: 43 U/L — HIGH (ref 10–40)
BASE EXCESS BLDV CALC-SCNC: 1.2 MMOL/L — SIGNIFICANT CHANGE UP (ref -2–2)
BASOPHILS # BLD AUTO: 0 K/UL — SIGNIFICANT CHANGE UP (ref 0–0.2)
BASOPHILS NFR BLD AUTO: 0.2 % — SIGNIFICANT CHANGE UP (ref 0–2)
BILIRUB SERPL-MCNC: 0.7 MG/DL — SIGNIFICANT CHANGE UP (ref 0.2–1.2)
BUN SERPL-MCNC: 38 MG/DL — HIGH (ref 7–23)
CA-I SERPL-SCNC: 1.22 MMOL/L — SIGNIFICANT CHANGE UP (ref 1.12–1.3)
CALCIUM SERPL-MCNC: 9.9 MG/DL — SIGNIFICANT CHANGE UP (ref 8.4–10.5)
CHLORIDE BLDV-SCNC: 107 MMOL/L — SIGNIFICANT CHANGE UP (ref 96–108)
CHLORIDE SERPL-SCNC: 102 MMOL/L — SIGNIFICANT CHANGE UP (ref 96–108)
CO2 BLDV-SCNC: 28 MMOL/L — SIGNIFICANT CHANGE UP (ref 22–30)
CO2 SERPL-SCNC: 23 MMOL/L — SIGNIFICANT CHANGE UP (ref 22–31)
CREAT SERPL-MCNC: 1.37 MG/DL — HIGH (ref 0.5–1.3)
EOSINOPHIL # BLD AUTO: 0 K/UL — SIGNIFICANT CHANGE UP (ref 0–0.5)
EOSINOPHIL NFR BLD AUTO: 0.3 % — SIGNIFICANT CHANGE UP (ref 0–6)
GAS PNL BLDV: 134 MMOL/L — LOW (ref 135–145)
GAS PNL BLDV: SIGNIFICANT CHANGE UP
GLUCOSE BLDV-MCNC: 173 MG/DL — HIGH (ref 70–99)
GLUCOSE SERPL-MCNC: 176 MG/DL — HIGH (ref 70–99)
HCO3 BLDV-SCNC: 27 MMOL/L — SIGNIFICANT CHANGE UP (ref 21–29)
HCT VFR BLD CALC: 39.4 % — SIGNIFICANT CHANGE UP (ref 39–50)
HCT VFR BLDA CALC: 43 % — SIGNIFICANT CHANGE UP (ref 39–50)
HGB BLD CALC-MCNC: 13.9 G/DL — SIGNIFICANT CHANGE UP (ref 13–17)
HGB BLD-MCNC: 13.3 G/DL — SIGNIFICANT CHANGE UP (ref 13–17)
INR BLD: 1 RATIO — SIGNIFICANT CHANGE UP (ref 0.88–1.16)
LACTATE BLDV-MCNC: 1.3 MMOL/L — SIGNIFICANT CHANGE UP (ref 0.7–2)
LIDOCAIN IGE QN: 31 U/L — SIGNIFICANT CHANGE UP (ref 7–60)
LYMPHOCYTES # BLD AUTO: 1 K/UL — SIGNIFICANT CHANGE UP (ref 1–3.3)
LYMPHOCYTES # BLD AUTO: 8.9 % — LOW (ref 13–44)
MAGNESIUM SERPL-MCNC: 2 MG/DL — SIGNIFICANT CHANGE UP (ref 1.6–2.6)
MCHC RBC-ENTMCNC: 32.1 PG — SIGNIFICANT CHANGE UP (ref 27–34)
MCHC RBC-ENTMCNC: 33.8 GM/DL — SIGNIFICANT CHANGE UP (ref 32–36)
MCV RBC AUTO: 95 FL — SIGNIFICANT CHANGE UP (ref 80–100)
MONOCYTES # BLD AUTO: 0.8 K/UL — SIGNIFICANT CHANGE UP (ref 0–0.9)
MONOCYTES NFR BLD AUTO: 7.3 % — SIGNIFICANT CHANGE UP (ref 2–14)
NEUTROPHILS # BLD AUTO: 9.5 K/UL — HIGH (ref 1.8–7.4)
NEUTROPHILS NFR BLD AUTO: 83.3 % — HIGH (ref 43–77)
PCO2 BLDV: 48 MMHG — SIGNIFICANT CHANGE UP (ref 35–50)
PH BLDV: 7.37 — SIGNIFICANT CHANGE UP (ref 7.35–7.45)
PLATELET # BLD AUTO: 217 K/UL — SIGNIFICANT CHANGE UP (ref 150–400)
PO2 BLDV: <20 MMHG — LOW (ref 25–45)
POTASSIUM BLDV-SCNC: 5 MMOL/L — SIGNIFICANT CHANGE UP (ref 3.5–5.3)
POTASSIUM SERPL-MCNC: 5 MMOL/L — SIGNIFICANT CHANGE UP (ref 3.5–5.3)
POTASSIUM SERPL-SCNC: 5 MMOL/L — SIGNIFICANT CHANGE UP (ref 3.5–5.3)
PROT SERPL-MCNC: 7.6 G/DL — SIGNIFICANT CHANGE UP (ref 6–8.3)
PROTHROM AB SERPL-ACNC: 11.5 SEC — SIGNIFICANT CHANGE UP (ref 10–12.9)
RBC # BLD: 4.15 M/UL — LOW (ref 4.2–5.8)
RBC # FLD: 12.5 % — SIGNIFICANT CHANGE UP (ref 10.3–14.5)
SAO2 % BLDV: 27 % — LOW (ref 67–88)
SODIUM SERPL-SCNC: 139 MMOL/L — SIGNIFICANT CHANGE UP (ref 135–145)
TROPONIN T, HIGH SENSITIVITY RESULT: 42 NG/L — SIGNIFICANT CHANGE UP (ref 0–51)
WBC # BLD: 11.4 K/UL — HIGH (ref 3.8–10.5)
WBC # FLD AUTO: 11.4 K/UL — HIGH (ref 3.8–10.5)

## 2019-08-14 PROCEDURE — 73501 X-RAY EXAM HIP UNI 1 VIEW: CPT | Mod: 26,RT

## 2019-08-14 PROCEDURE — 71045 X-RAY EXAM CHEST 1 VIEW: CPT | Mod: 26

## 2019-08-14 PROCEDURE — 93010 ELECTROCARDIOGRAM REPORT: CPT

## 2019-08-14 PROCEDURE — 99285 EMERGENCY DEPT VISIT HI MDM: CPT | Mod: GC

## 2019-08-14 PROCEDURE — 73560 X-RAY EXAM OF KNEE 1 OR 2: CPT | Mod: 26,RT

## 2019-08-14 RX ORDER — LABETALOL HCL 100 MG
5 TABLET ORAL ONCE
Refills: 0 | Status: COMPLETED | OUTPATIENT
Start: 2019-08-14 | End: 2019-08-14

## 2019-08-14 RX ORDER — METOPROLOL TARTRATE 50 MG
12.5 TABLET ORAL ONCE
Refills: 0 | Status: COMPLETED | OUTPATIENT
Start: 2019-08-14 | End: 2019-08-14

## 2019-08-14 RX ORDER — LABETALOL HCL 100 MG
10 TABLET ORAL ONCE
Refills: 0 | Status: COMPLETED | OUTPATIENT
Start: 2019-08-14 | End: 2019-08-14

## 2019-08-14 RX ORDER — SODIUM CHLORIDE 9 MG/ML
1000 INJECTION, SOLUTION INTRAVENOUS
Refills: 0 | Status: DISCONTINUED | OUTPATIENT
Start: 2019-08-14 | End: 2019-08-14

## 2019-08-14 RX ORDER — CLONAZEPAM 1 MG
0.5 TABLET ORAL ONCE
Refills: 0 | Status: DISCONTINUED | OUTPATIENT
Start: 2019-08-14 | End: 2019-08-14

## 2019-08-14 RX ORDER — ACETAMINOPHEN 500 MG
650 TABLET ORAL ONCE
Refills: 0 | Status: COMPLETED | OUTPATIENT
Start: 2019-08-14 | End: 2019-08-14

## 2019-08-14 RX ORDER — SODIUM CHLORIDE 9 MG/ML
1000 INJECTION, SOLUTION INTRAVENOUS ONCE
Refills: 0 | Status: COMPLETED | OUTPATIENT
Start: 2019-08-14 | End: 2019-08-14

## 2019-08-14 RX ADMIN — Medication 0.5 MILLIGRAM(S): at 19:30

## 2019-08-14 RX ADMIN — Medication 10 MILLIGRAM(S): at 22:31

## 2019-08-14 RX ADMIN — Medication 12.5 MILLIGRAM(S): at 19:31

## 2019-08-14 RX ADMIN — Medication 650 MILLIGRAM(S): at 14:53

## 2019-08-14 RX ADMIN — SODIUM CHLORIDE 1000 MILLILITER(S): 9 INJECTION, SOLUTION INTRAVENOUS at 14:54

## 2019-08-14 RX ADMIN — Medication 5 MILLIGRAM(S): at 21:45

## 2019-08-14 NOTE — ED ADULT NURSE REASSESSMENT NOTE - NS ED NURSE REASSESS COMMENT FT1
Senior care contacted at this time. States that transportation was cancelled due to unstable vital signs. BP Patients blood pressure was attended to and ED rep placed patient  back in for transport through senior care. Patients daughter was notified.

## 2019-08-14 NOTE — ED PROVIDER NOTE - NSFOLLOWUPINSTRUCTIONS_ED_ALL_ED_FT
1) Please follow-up with your primary care doctor in the next 2-3 days.  Please call tomorrow for an appointment.  If you cannot follow-up with your primary care doctor please return to the ED for any urgent issues.  2) You were given a copy of the tests performed today.  Please bring the results with you and review them with your primary care doctor.  3) If you have any worsening of symptoms or any other concerns please return to the ED immediately. Such as but not limited to fever , chill, increase pain  4) Please continue taking your home medications as directed.

## 2019-08-14 NOTE — ED ADULT NURSE REASSESSMENT NOTE - NS ED NURSE REASSESS COMMENT FT1
Emil A&Ox1, oriented to self. dementia at baseline. Patient has made multiple attempts at getting out of bed. Patient to be placed on 1:1 at this time for risk of fall. MD made aware.

## 2019-08-14 NOTE — ED ADULT NURSE NOTE - OBJECTIVE STATEMENT
Patient presents to Ed via amb from [facility]. Per Ems and note sent with patient, patient fell on Sunday and has  been c/o rt rib, rt hip and knee pain. Patient with hx of dementia hard of hearing and macular degeneration causing blindness.  Patient c/o rt rib pain on movement and pain to rt knee, denies rt hip pain on palpation. Patient denies chest pain, no sob  denies nausea or vomiting, no fever or chills, no bruising or swelling to head, +ecchymosis to rt arm. RAC 20g iv lock placed  labs drawn and sent.

## 2019-08-14 NOTE — ED ADULT NURSE NOTE - PMH
Dementia    Dementia  Dx in 2014  DM (diabetes mellitus)  on glipizide  Essential hypertension    GIB (gastrointestinal bleeding)  due to ASA 20 years ago, no egd/ colonoscopy  History of goiter  Rt sided dx'd 5 years ago  [place] (hard of hearing)  Refused Hearing Aides  Orthostatic hypotension    Prostate cancer  s/p Radiation seeding, in remission for past 25 years  T2DM (type 2 diabetes mellitus)

## 2019-08-14 NOTE — ED PROVIDER NOTE - ATTENDING CONTRIBUTION TO CARE
I have seen and evaluated this patient with the resident.   I agree with the findings  unless other wise stated.  I have made appropriate changes in documentations where needed, After my face to face bedside evaluation, I am further  notin y m pmh of dementia, orthostatic hypotension, deafness, macular degeneration causing blindness pw knee and hip pain after a fall he had a presumed mechanical fall 5 days ago presented form nursing home got pan scanned at that time which was negative for acute injury however today he says he has pain in his knee and right hip was ambulatory yesterday without a walker daughter just wants xrays and no other imaging reviewed no fracture will have outpatient PMD follow up  Compliance to diet and medicines stressed will have out patient follow up. Return instructions discussed with patient and patient understood --Nelson

## 2019-08-14 NOTE — ED PROVIDER NOTE - PHYSICAL EXAMINATION
CONSTITUTIONAL: non-toxic, NAD, no respiratory distress, non-diaphoretic   HEAD: Normocephalic; atraumatic, no tilley sign, no raccoon eyes, no step offs, no hematoma, no abrasions,  EYES: EOM intact  ENMT: External appears normal; normal oropharynx  NECK: Supple; non-tender; normal ROM  CARD: Normal S1, S2; no murmur; no rubs, or gallops  RESP: Normal chest excursion with respiration; breath sounds clear and equal bilaterally; no wheezes,rhonchi, or rales  ABD: Soft, non-distended; non-tender; no RUQ tenderness, no RLQ tenderness  EXT: Normal ROM in all four extremities; non-tender to palpation; distal pulses intact  SKIN: Warm, dry, no rash  NEURO: CN 2-12 grossly intact, Mental status AAOx3  PERRL  Trauma: Normal ROM in all four extremities; non-tender to palpation; no midline tenderness, distal pulses intact, no scaphoid tenderness, no tenderness to medial and lateral malleoli of legs bilaterally, no tenderness to navicular bone bilaterally, no tenderness to 5th metatarsal bilaterally, minimal erythema right ventral knee, eccymoses r elbow

## 2019-08-14 NOTE — ED PROVIDER NOTE - PMH
Dementia    Dementia  Dx in 2014  DM (diabetes mellitus)  on glipizide  Essential hypertension    GIB (gastrointestinal bleeding)  due to ASA 20 years ago, no egd/ colonoscopy  History of goiter  Rt sided dx'd 5 years ago  [place] (hard of hearing)  Refused Hearing Aides  Orthostatic hypotension    Prostate cancer  s/p Radiation seeding, in remission for past 25 years  T2DM (type 2 diabetes mellitus)

## 2019-08-14 NOTE — ED ADULT NURSE REASSESSMENT NOTE - NS ED NURSE REASSESS COMMENT FT1
Patient A&Ox1 oriented to self. Patient lying in bed, complaints, continues to undress himself. Acting baseline behavior. Patient given BP meds appx 1 hr ago. /95. MD nieves made aware.

## 2019-08-15 VITALS
RESPIRATION RATE: 20 BRPM | HEART RATE: 83 BPM | SYSTOLIC BLOOD PRESSURE: 188 MMHG | DIASTOLIC BLOOD PRESSURE: 81 MMHG | TEMPERATURE: 98 F | OXYGEN SATURATION: 98 %

## 2019-08-15 PROCEDURE — 83735 ASSAY OF MAGNESIUM: CPT

## 2019-08-15 PROCEDURE — 82803 BLOOD GASES ANY COMBINATION: CPT

## 2019-08-15 PROCEDURE — 84295 ASSAY OF SERUM SODIUM: CPT

## 2019-08-15 PROCEDURE — 73501 X-RAY EXAM HIP UNI 1 VIEW: CPT

## 2019-08-15 PROCEDURE — 85027 COMPLETE CBC AUTOMATED: CPT

## 2019-08-15 PROCEDURE — 82330 ASSAY OF CALCIUM: CPT

## 2019-08-15 PROCEDURE — 84132 ASSAY OF SERUM POTASSIUM: CPT

## 2019-08-15 PROCEDURE — 83690 ASSAY OF LIPASE: CPT

## 2019-08-15 PROCEDURE — 93005 ELECTROCARDIOGRAM TRACING: CPT

## 2019-08-15 PROCEDURE — 71045 X-RAY EXAM CHEST 1 VIEW: CPT

## 2019-08-15 PROCEDURE — 80053 COMPREHEN METABOLIC PANEL: CPT

## 2019-08-15 PROCEDURE — 96376 TX/PRO/DX INJ SAME DRUG ADON: CPT

## 2019-08-15 PROCEDURE — 82435 ASSAY OF BLOOD CHLORIDE: CPT

## 2019-08-15 PROCEDURE — 96374 THER/PROPH/DIAG INJ IV PUSH: CPT

## 2019-08-15 PROCEDURE — 85014 HEMATOCRIT: CPT

## 2019-08-15 PROCEDURE — 84484 ASSAY OF TROPONIN QUANT: CPT

## 2019-08-15 PROCEDURE — 83605 ASSAY OF LACTIC ACID: CPT

## 2019-08-15 PROCEDURE — 99284 EMERGENCY DEPT VISIT MOD MDM: CPT | Mod: 25

## 2019-08-15 PROCEDURE — 82947 ASSAY GLUCOSE BLOOD QUANT: CPT

## 2019-08-15 PROCEDURE — 85610 PROTHROMBIN TIME: CPT

## 2019-08-15 PROCEDURE — 85730 THROMBOPLASTIN TIME PARTIAL: CPT

## 2019-08-15 PROCEDURE — 73560 X-RAY EXAM OF KNEE 1 OR 2: CPT

## 2019-08-15 RX ORDER — LABETALOL HCL 100 MG
10 TABLET ORAL ONCE
Refills: 0 | Status: COMPLETED | OUTPATIENT
Start: 2019-08-15 | End: 2019-08-15

## 2019-08-15 RX ADMIN — Medication 10 MILLIGRAM(S): at 03:12

## 2019-08-15 RX ADMIN — Medication 650 MILLIGRAM(S): at 03:00

## 2019-08-15 NOTE — ED ADULT NURSE REASSESSMENT NOTE - NS ED NURSE REASSESS COMMENT FT1
[facility] has not arrived at this time. [facility] contacted for the 3rd time regarding patient transport. [facility] states that they will be there around 2:30. Patients family member notified by RN. Patients family member upset with how long transport is taking. Daughter assured by RN that patient is comfortable, given food and drink, denies pain, no signs/symptoms of discomfort and safety is maintained. Patients daughter still unsatisfied and hung up phone.

## 2019-08-15 NOTE — ED ADULT NURSE REASSESSMENT NOTE - NS ED NURSE REASSESS COMMENT FT1
[facility] at bedside for patient transport back to [facility]. VSS. IV removed by RN.

## 2019-09-15 ENCOUNTER — INPATIENT (INPATIENT)
Facility: HOSPITAL | Age: 84
LOS: 10 days | Discharge: OTHER INSTITUTION | End: 2019-09-26
Attending: INTERNAL MEDICINE | Admitting: INTERNAL MEDICINE
Payer: MEDICARE

## 2019-09-15 VITALS
DIASTOLIC BLOOD PRESSURE: 32 MMHG | RESPIRATION RATE: 16 BRPM | OXYGEN SATURATION: 96 % | HEART RATE: 80 BPM | SYSTOLIC BLOOD PRESSURE: 114 MMHG | TEMPERATURE: 97 F

## 2019-09-15 LAB
ALBUMIN SERPL ELPH-MCNC: 2.8 G/DL — LOW (ref 3.3–5)
ALP SERPL-CCNC: 175 U/L — HIGH (ref 40–120)
ALT FLD-CCNC: 69 U/L — HIGH (ref 4–41)
AMPHET UR-MCNC: NEGATIVE — SIGNIFICANT CHANGE UP
ANION GAP SERPL CALC-SCNC: 9 MMO/L — SIGNIFICANT CHANGE UP (ref 7–14)
ANISOCYTOSIS BLD QL: SLIGHT — SIGNIFICANT CHANGE UP
APAP SERPL-MCNC: < 15 UG/ML — LOW (ref 15–25)
APPEARANCE UR: SIGNIFICANT CHANGE UP
APTT BLD: 40 SEC — HIGH (ref 27.5–36.3)
AST SERPL-CCNC: 41 U/L — HIGH (ref 4–40)
B PERT DNA SPEC QL NAA+PROBE: NOT DETECTED — SIGNIFICANT CHANGE UP
BACTERIA # UR AUTO: SIGNIFICANT CHANGE UP
BARBITURATES UR SCN-MCNC: NEGATIVE — SIGNIFICANT CHANGE UP
BASE EXCESS BLDV CALC-SCNC: 1.5 MMOL/L — SIGNIFICANT CHANGE UP
BASOPHILS # BLD AUTO: 0.03 K/UL — SIGNIFICANT CHANGE UP (ref 0–0.2)
BASOPHILS NFR BLD AUTO: 0.3 % — SIGNIFICANT CHANGE UP (ref 0–2)
BASOPHILS NFR SPEC: 0 % — SIGNIFICANT CHANGE UP (ref 0–2)
BENZODIAZ UR-MCNC: NEGATIVE — SIGNIFICANT CHANGE UP
BILIRUB SERPL-MCNC: 0.4 MG/DL — SIGNIFICANT CHANGE UP (ref 0.2–1.2)
BILIRUB UR-MCNC: NEGATIVE — SIGNIFICANT CHANGE UP
BLASTS # FLD: 0 % — SIGNIFICANT CHANGE UP (ref 0–0)
BLOOD GAS VENOUS - CREATININE: 1.58 MG/DL — HIGH (ref 0.5–1.3)
BLOOD UR QL VISUAL: HIGH
BUN SERPL-MCNC: 44 MG/DL — HIGH (ref 7–23)
C PNEUM DNA SPEC QL NAA+PROBE: NOT DETECTED — SIGNIFICANT CHANGE UP
CALCIUM SERPL-MCNC: 9.2 MG/DL — SIGNIFICANT CHANGE UP (ref 8.4–10.5)
CANNABINOIDS UR-MCNC: NEGATIVE — SIGNIFICANT CHANGE UP
CHLORIDE BLDV-SCNC: 110 MMOL/L — HIGH (ref 96–108)
CHLORIDE SERPL-SCNC: 108 MMOL/L — HIGH (ref 98–107)
CO2 SERPL-SCNC: 25 MMOL/L — SIGNIFICANT CHANGE UP (ref 22–31)
COCAINE METAB.OTHER UR-MCNC: NEGATIVE — SIGNIFICANT CHANGE UP
COLOR SPEC: YELLOW — SIGNIFICANT CHANGE UP
CREAT SERPL-MCNC: 1.55 MG/DL — HIGH (ref 0.5–1.3)
EOSINOPHIL # BLD AUTO: 0.02 K/UL — SIGNIFICANT CHANGE UP (ref 0–0.5)
EOSINOPHIL NFR BLD AUTO: 0.2 % — SIGNIFICANT CHANGE UP (ref 0–6)
EOSINOPHIL NFR FLD: 0 % — SIGNIFICANT CHANGE UP (ref 0–6)
ETHANOL BLD-MCNC: < 10 MG/DL — SIGNIFICANT CHANGE UP
FLUAV H1 2009 PAND RNA SPEC QL NAA+PROBE: NOT DETECTED — SIGNIFICANT CHANGE UP
FLUAV H1 RNA SPEC QL NAA+PROBE: NOT DETECTED — SIGNIFICANT CHANGE UP
FLUAV H3 RNA SPEC QL NAA+PROBE: NOT DETECTED — SIGNIFICANT CHANGE UP
FLUAV SUBTYP SPEC NAA+PROBE: NOT DETECTED — SIGNIFICANT CHANGE UP
FLUBV RNA SPEC QL NAA+PROBE: NOT DETECTED — SIGNIFICANT CHANGE UP
GAS PNL BLDV: 140 MMOL/L — SIGNIFICANT CHANGE UP (ref 136–146)
GLUCOSE BLDV-MCNC: 130 MG/DL — HIGH (ref 70–99)
GLUCOSE SERPL-MCNC: 139 MG/DL — HIGH (ref 70–99)
GLUCOSE UR-MCNC: 100 — SIGNIFICANT CHANGE UP
HADV DNA SPEC QL NAA+PROBE: NOT DETECTED — SIGNIFICANT CHANGE UP
HCO3 BLDV-SCNC: 25 MMOL/L — SIGNIFICANT CHANGE UP (ref 20–27)
HCOV PNL SPEC NAA+PROBE: SIGNIFICANT CHANGE UP
HCT VFR BLD CALC: 30.7 % — LOW (ref 39–50)
HCT VFR BLDV CALC: 32.1 % — LOW (ref 39–51)
HGB BLD-MCNC: 10 G/DL — LOW (ref 13–17)
HGB BLDV-MCNC: 10.4 G/DL — LOW (ref 13–17)
HMPV RNA SPEC QL NAA+PROBE: NOT DETECTED — SIGNIFICANT CHANGE UP
HPIV1 RNA SPEC QL NAA+PROBE: NOT DETECTED — SIGNIFICANT CHANGE UP
HPIV2 RNA SPEC QL NAA+PROBE: NOT DETECTED — SIGNIFICANT CHANGE UP
HPIV3 RNA SPEC QL NAA+PROBE: NOT DETECTED — SIGNIFICANT CHANGE UP
HPIV4 RNA SPEC QL NAA+PROBE: NOT DETECTED — SIGNIFICANT CHANGE UP
HYALINE CASTS # UR AUTO: NEGATIVE — SIGNIFICANT CHANGE UP
IMM GRANULOCYTES NFR BLD AUTO: 1.1 % — SIGNIFICANT CHANGE UP (ref 0–1.5)
INR BLD: 1.15 — SIGNIFICANT CHANGE UP (ref 0.88–1.17)
KETONES UR-MCNC: NEGATIVE — SIGNIFICANT CHANGE UP
LACTATE BLDV-MCNC: 1.5 MMOL/L — SIGNIFICANT CHANGE UP (ref 0.5–2)
LEUKOCYTE ESTERASE UR-ACNC: SIGNIFICANT CHANGE UP
LYMPHOCYTES # BLD AUTO: 0.56 K/UL — LOW (ref 1–3.3)
LYMPHOCYTES # BLD AUTO: 4.7 % — LOW (ref 13–44)
LYMPHOCYTES NFR SPEC AUTO: 2.6 % — LOW (ref 13–44)
MACROCYTES BLD QL: SLIGHT — SIGNIFICANT CHANGE UP
MANUAL SMEAR VERIFICATION: SIGNIFICANT CHANGE UP
MCHC RBC-ENTMCNC: 31.2 PG — SIGNIFICANT CHANGE UP (ref 27–34)
MCHC RBC-ENTMCNC: 32.6 % — SIGNIFICANT CHANGE UP (ref 32–36)
MCV RBC AUTO: 95.6 FL — SIGNIFICANT CHANGE UP (ref 80–100)
METAMYELOCYTES # FLD: 1.8 % — HIGH (ref 0–1)
METHADONE UR-MCNC: NEGATIVE — SIGNIFICANT CHANGE UP
MONOCYTES # BLD AUTO: 0.58 K/UL — SIGNIFICANT CHANGE UP (ref 0–0.9)
MONOCYTES NFR BLD AUTO: 4.9 % — SIGNIFICANT CHANGE UP (ref 2–14)
MONOCYTES NFR BLD: 1.7 % — LOW (ref 2–9)
MYELOCYTES NFR BLD: 0 % — SIGNIFICANT CHANGE UP (ref 0–0)
NEUTROPHIL AB SER-ACNC: 74.6 % — SIGNIFICANT CHANGE UP (ref 43–77)
NEUTROPHILS # BLD AUTO: 10.57 K/UL — HIGH (ref 1.8–7.4)
NEUTROPHILS NFR BLD AUTO: 88.8 % — HIGH (ref 43–77)
NEUTS BAND # BLD: 19.3 % — HIGH (ref 0–6)
NITRITE UR-MCNC: POSITIVE — HIGH
NRBC # FLD: 0.02 K/UL — SIGNIFICANT CHANGE UP (ref 0–0)
OPIATES UR-MCNC: NEGATIVE — SIGNIFICANT CHANGE UP
OTHER - HEMATOLOGY %: 0 — SIGNIFICANT CHANGE UP
OVALOCYTES BLD QL SMEAR: SLIGHT — SIGNIFICANT CHANGE UP
OXYCODONE UR-MCNC: NEGATIVE — SIGNIFICANT CHANGE UP
PCO2 BLDV: 44 MMHG — SIGNIFICANT CHANGE UP (ref 41–51)
PCP UR-MCNC: NEGATIVE — SIGNIFICANT CHANGE UP
PH BLDV: 7.39 PH — SIGNIFICANT CHANGE UP (ref 7.32–7.43)
PH UR: 6 — SIGNIFICANT CHANGE UP (ref 5–8)
PLATELET # BLD AUTO: 68 K/UL — LOW (ref 150–400)
PLATELET COUNT - ESTIMATE: SIGNIFICANT CHANGE UP
PMV BLD: 10.8 FL — SIGNIFICANT CHANGE UP (ref 7–13)
PO2 BLDV: 62 MMHG — HIGH (ref 35–40)
POIKILOCYTOSIS BLD QL AUTO: SLIGHT — SIGNIFICANT CHANGE UP
POTASSIUM BLDV-SCNC: 5.4 MMOL/L — HIGH (ref 3.4–4.5)
POTASSIUM SERPL-MCNC: 5.5 MMOL/L — HIGH (ref 3.5–5.3)
POTASSIUM SERPL-SCNC: 5.5 MMOL/L — HIGH (ref 3.5–5.3)
PROMYELOCYTES # FLD: 0 % — SIGNIFICANT CHANGE UP (ref 0–0)
PROT SERPL-MCNC: 6 G/DL — SIGNIFICANT CHANGE UP (ref 6–8.3)
PROT UR-MCNC: 70 — SIGNIFICANT CHANGE UP
PROTHROM AB SERPL-ACNC: 12.8 SEC — SIGNIFICANT CHANGE UP (ref 9.8–13.1)
RBC # BLD: 3.21 M/UL — LOW (ref 4.2–5.8)
RBC # FLD: 15.4 % — HIGH (ref 10.3–14.5)
RBC CASTS # UR COMP ASSIST: HIGH (ref 0–?)
RSV RNA SPEC QL NAA+PROBE: NOT DETECTED — SIGNIFICANT CHANGE UP
RV+EV RNA SPEC QL NAA+PROBE: NOT DETECTED — SIGNIFICANT CHANGE UP
SALICYLATES SERPL-MCNC: < 5 MG/DL — LOW (ref 15–30)
SAO2 % BLDV: 90.8 % — HIGH (ref 60–85)
SCHISTOCYTES BLD QL AUTO: SLIGHT — SIGNIFICANT CHANGE UP
SODIUM SERPL-SCNC: 142 MMOL/L — SIGNIFICANT CHANGE UP (ref 135–145)
SP GR SPEC: 1.01 — SIGNIFICANT CHANGE UP (ref 1–1.04)
SQUAMOUS # UR AUTO: SIGNIFICANT CHANGE UP
UROBILINOGEN FLD QL: NORMAL — SIGNIFICANT CHANGE UP
VARIANT LYMPHS # BLD: 0 % — SIGNIFICANT CHANGE UP
WBC # BLD: 11.89 K/UL — HIGH (ref 3.8–10.5)
WBC # FLD AUTO: 11.89 K/UL — HIGH (ref 3.8–10.5)
WBC UR QL: >50 — HIGH (ref 0–?)

## 2019-09-15 PROCEDURE — 71045 X-RAY EXAM CHEST 1 VIEW: CPT | Mod: 26

## 2019-09-15 RX ORDER — SODIUM CHLORIDE 9 MG/ML
1000 INJECTION INTRAMUSCULAR; INTRAVENOUS; SUBCUTANEOUS ONCE
Refills: 0 | Status: COMPLETED | OUTPATIENT
Start: 2019-09-15 | End: 2019-09-15

## 2019-09-15 RX ORDER — PIPERACILLIN AND TAZOBACTAM 4; .5 G/20ML; G/20ML
3.38 INJECTION, POWDER, LYOPHILIZED, FOR SOLUTION INTRAVENOUS ONCE
Refills: 0 | Status: COMPLETED | OUTPATIENT
Start: 2019-09-15 | End: 2019-09-15

## 2019-09-15 RX ORDER — VANCOMYCIN HCL 1 G
1000 VIAL (EA) INTRAVENOUS ONCE
Refills: 0 | Status: COMPLETED | OUTPATIENT
Start: 2019-09-15 | End: 2019-09-15

## 2019-09-15 RX ORDER — PIPERACILLIN AND TAZOBACTAM 4; .5 G/20ML; G/20ML
3.38 INJECTION, POWDER, LYOPHILIZED, FOR SOLUTION INTRAVENOUS ONCE
Refills: 0 | Status: DISCONTINUED | OUTPATIENT
Start: 2019-09-15 | End: 2019-09-16

## 2019-09-15 RX ADMIN — Medication 250 MILLIGRAM(S): at 21:48

## 2019-09-15 RX ADMIN — SODIUM CHLORIDE 1000 MILLILITER(S): 9 INJECTION INTRAMUSCULAR; INTRAVENOUS; SUBCUTANEOUS at 19:07

## 2019-09-15 RX ADMIN — Medication 1000 MILLIGRAM(S): at 23:28

## 2019-09-15 RX ADMIN — PIPERACILLIN AND TAZOBACTAM 200 GRAM(S): 4; .5 INJECTION, POWDER, LYOPHILIZED, FOR SOLUTION INTRAVENOUS at 19:43

## 2019-09-15 NOTE — ED PROVIDER NOTE - ATTENDING CONTRIBUTION TO CARE
Dr. Johnson: I have personally performed a face to face bedside history and physical examination of this patient. I have discussed the history, examination, review of systems, assessment and plan of management with the resident. I have reviewed the electronic medical record and amended it to reflect my history, review of systems, physical exam, assessment and plan.    89M presents from assisted living facility with AMS. Pt is normally able to carry simple conversation but today not responding. Fall 3 days ago.    On exam Dr. Johnson: I have personally performed a face to face bedside history and physical examination of this patient. I have discussed the history, examination, review of systems, assessment and plan of management with the resident. I have reviewed the electronic medical record and amended it to reflect my history, review of systems, physical exam, assessment and plan.    89M with pmh of DM ,dementia, GI bleed  presents from NH with AMS. Pt is normally able to carry simple conversation but today not responding. Fall 3 days ago and also 3 weeks ago. No hx of fever. Pt is DNI/DNR per chart review.    On exam elderly, not verbalizing, appears lethargic  old R periorbital contusion, no crepitus or bony tenderness  C spine without step offs  coarse breath sounds bilaterally  abd soft, mild diffuse ttp    89M with multiple medical problem p/w AMS in setting of recent fall. Also with mildly tender abdomen. R/o ICH, underlying infection, electrolyte abnormality, intraabdominal pathology. Plan for labs, UA, CXR, CT head, CT a/p, admit.

## 2019-09-15 NOTE — ED PROVIDER NOTE - CARE PLAN
Principal Discharge DX:	Pneumonia of both lungs due to infectious organism, unspecified part of lung  Secondary Diagnosis:	SDH (subdural hematoma)

## 2019-09-15 NOTE — ED ADULT NURSE NOTE - OBJECTIVE STATEMENT
Pt received in #27, eyes closed, non-verbal, not following commands and withdrawing to noxious stimuli. Per Aide at bedside, the pt is able to converse, respond to questions and is able to verbalize his wants. However, the pt was noted to be in his current mental state with lethargy. Of note, pt had a fall 3 days ago. Pt noted to have b/l skin tears/abrasions to his elbows which are in a state of healing. Pt placed on CM with continuous pulse oximetry, iv established, labs sent.

## 2019-09-15 NOTE — ED PROVIDER NOTE - PMH
Dementia    Dementia  Dx in 2014  DM (diabetes mellitus)  on glipizide  Essential hypertension    GIB (gastrointestinal bleeding)  due to ASA 20 years ago, no egd/ colonoscopy  History of goiter  Rt sided dx'd 5 years ago  [place] (hard of hearing)  Refused Hearing Aides  Orthostatic hypotension    Prostate cancer  s/p Radiation seeding, in remission for past 25 years  T2DM (type 2 diabetes mellitus)

## 2019-09-15 NOTE — ED PROVIDER NOTE - PHYSICAL EXAMINATION
General: eye closed, unresponsive to voice, responsive to pain.   HEENT: normocephalic, + mild L infraorbital ecchymosis, neck supple.   Cardiac: RRR, S1, S2, no murmur, rubs, gallop.   LUNGS: CTA B/L no wheeze, rhonchi, rales.   Abdomen: soft + tender palpation in B/L lower quadrants (swats hand away), ND   EXT: moves all extremities to pain, mild non pitting edema bilateral feet.    Neuro: A&O, no focal neurological deficits  Skin: warm, dry, no rash, no lesions

## 2019-09-15 NOTE — ED PROVIDER NOTE - CLINICAL SUMMARY MEDICAL DECISION MAKING FREE TEXT BOX
89 yo male with acute AMS. Unable to obtain history. Possible abd tenderness on exam. CT head and C spine given fall 3 days ago. Infectious workup with blood and urine cultures and CXR to look for source of infection. Labs for electrolyte abnormalities. Likely will require admission given acute onset AMS. Reassess

## 2019-09-15 NOTE — ED ADULT NURSE NOTE - NSIMPLEMENTINTERV_GEN_ALL_ED
Implemented All Fall with Harm Risk Interventions:  [place] to call system. Call bell, personal items and telephone within reach. Instruct patient to call for assistance. Room bathroom lighting operational. Non-slip footwear when patient is off stretcher. Physically safe environment: no spills, clutter or unnecessary equipment. Stretcher in lowest position, wheels locked, appropriate side rails in place. Provide visual cue, wrist band, yellow gown, etc. Monitor gait and stability. Monitor for mental status changes and reorient to person, place, and time. Review medications for side effects contributing to fall risk. Reinforce activity limits and safety measures with patient and family. Provide visual clues: red socks.

## 2019-09-15 NOTE — ED PROVIDER NOTE - PROGRESS NOTE DETAILS
Marcie Ojeda M.D. Resident  Wife and daughter at bedside, states unresponsiveness is acute for 1 day. States that yesterday he would respond to simple questions like "do you want to eat". Daughter endorse that right infraorbital ecchymosis is from fall 3 weeks ago and it almost completely resolved per pictures from 3 weeks ago. Family members endorse that he has frequent falls and has been slowly becoming more disoriented in the past 3 months due to baseline dementia, but was acutely unresponsive just starting yesterday. Alex: UA positive for UTI and CXR with possible pneumonia. will cover with broad spectrum abx. Pt still pending CTs, called  CT tech twice to expedite. Alex:  CT head shows chronic subdurals.  Neurosurgery paged. Pt signed out to Dr. Soliman pending neurosurgery recks and CT a/p read. Sonido Mcgraw MD, PGY2: Patient received at resident sign out. pna and uti, broad spectrum abx was started. CT head with SDH, no surgical indication at this time. Spoke with the daughter, Yasmine over the phone. Pt fell 3-4 dasy ago, that might be the cause. ams due to pna, uti, or SDH. SDH will be closely followed up, q4-6, CT head in 2-3 days. admission accepted by ANGELLA Bland.

## 2019-09-15 NOTE — ED PROVIDER NOTE - OBJECTIVE STATEMENT
89 YO male with pmhx of Dementia, HTN, DM on glipiziede, prostate CA s/p Radiation seeding, in remission for past 25 years, BIBEMS for acute unresponsiveness at assisted living for 1 day. Per EMS, at baseline, pt appears awake and will respond when spoken to and answer simple questions. In the ED, pt appears uncomfortable, eyes closed, does not respond to calling his name, but responds to pain. Of note, pt fell 3 weeks ago.

## 2019-09-16 DIAGNOSIS — D69.6 THROMBOCYTOPENIA, UNSPECIFIED: ICD-10-CM

## 2019-09-16 DIAGNOSIS — R74.0 NONSPECIFIC ELEVATION OF LEVELS OF TRANSAMINASE AND LACTIC ACID DEHYDROGENASE [LDH]: ICD-10-CM

## 2019-09-16 DIAGNOSIS — J18.9 PNEUMONIA, UNSPECIFIED ORGANISM: ICD-10-CM

## 2019-09-16 DIAGNOSIS — N30.90 CYSTITIS, UNSPECIFIED WITHOUT HEMATURIA: ICD-10-CM

## 2019-09-16 DIAGNOSIS — E11.9 TYPE 2 DIABETES MELLITUS WITHOUT COMPLICATIONS: ICD-10-CM

## 2019-09-16 DIAGNOSIS — G93.41 METABOLIC ENCEPHALOPATHY: ICD-10-CM

## 2019-09-16 DIAGNOSIS — E87.5 HYPERKALEMIA: ICD-10-CM

## 2019-09-16 DIAGNOSIS — Z71.89 OTHER SPECIFIED COUNSELING: ICD-10-CM

## 2019-09-16 DIAGNOSIS — S06.5X9A TRAUMATIC SUBDURAL HEMORRHAGE WITH LOSS OF CONSCIOUSNESS OF UNSPECIFIED DURATION, INITIAL ENCOUNTER: ICD-10-CM

## 2019-09-16 DIAGNOSIS — N39.0 URINARY TRACT INFECTION, SITE NOT SPECIFIED: ICD-10-CM

## 2019-09-16 DIAGNOSIS — Z29.9 ENCOUNTER FOR PROPHYLACTIC MEASURES, UNSPECIFIED: ICD-10-CM

## 2019-09-16 LAB
ANION GAP SERPL CALC-SCNC: 11 MMO/L — SIGNIFICANT CHANGE UP (ref 7–14)
ANION GAP SERPL CALC-SCNC: 16 MMO/L — HIGH (ref 7–14)
BASOPHILS # BLD AUTO: 0.02 K/UL — SIGNIFICANT CHANGE UP (ref 0–0.2)
BASOPHILS NFR BLD AUTO: 0.2 % — SIGNIFICANT CHANGE UP (ref 0–2)
BUN SERPL-MCNC: 46 MG/DL — HIGH (ref 7–23)
BUN SERPL-MCNC: 47 MG/DL — HIGH (ref 7–23)
CALCIUM SERPL-MCNC: 8.8 MG/DL — SIGNIFICANT CHANGE UP (ref 8.4–10.5)
CALCIUM SERPL-MCNC: 9.1 MG/DL — SIGNIFICANT CHANGE UP (ref 8.4–10.5)
CALCIUM SERPL-MCNC: 9.2 MG/DL — SIGNIFICANT CHANGE UP (ref 8.4–10.5)
CHLORIDE SERPL-SCNC: 102 MMOL/L — SIGNIFICANT CHANGE UP (ref 98–107)
CHLORIDE SERPL-SCNC: 103 MMOL/L — SIGNIFICANT CHANGE UP (ref 98–107)
CHLORIDE SERPL-SCNC: 108 MMOL/L — HIGH (ref 98–107)
CO2 SERPL-SCNC: 18 MMOL/L — LOW (ref 22–31)
CO2 SERPL-SCNC: 24 MMOL/L — SIGNIFICANT CHANGE UP (ref 22–31)
CREAT SERPL-MCNC: 1.42 MG/DL — HIGH (ref 0.5–1.3)
CREAT SERPL-MCNC: 1.54 MG/DL — HIGH (ref 0.5–1.3)
CREAT SERPL-MCNC: 1.63 MG/DL — HIGH (ref 0.5–1.3)
EOSINOPHIL # BLD AUTO: 0.04 K/UL — SIGNIFICANT CHANGE UP (ref 0–0.5)
EOSINOPHIL NFR BLD AUTO: 0.3 % — SIGNIFICANT CHANGE UP (ref 0–6)
GLUCOSE BLDC GLUCOMTR-MCNC: 102 MG/DL — HIGH (ref 70–99)
GLUCOSE BLDC GLUCOMTR-MCNC: 222 MG/DL — HIGH (ref 70–99)
GLUCOSE SERPL-MCNC: 105 MG/DL — HIGH (ref 70–99)
GLUCOSE SERPL-MCNC: 169 MG/DL — HIGH (ref 70–99)
GLUCOSE SERPL-MCNC: 208 MG/DL — HIGH (ref 70–99)
HCT VFR BLD CALC: 30 % — LOW (ref 39–50)
HGB BLD-MCNC: 9.7 G/DL — LOW (ref 13–17)
IMM GRANULOCYTES NFR BLD AUTO: 0.6 % — SIGNIFICANT CHANGE UP (ref 0–1.5)
LYMPHOCYTES # BLD AUTO: 0.47 K/UL — LOW (ref 1–3.3)
LYMPHOCYTES # BLD AUTO: 3.7 % — LOW (ref 13–44)
MAGNESIUM SERPL-MCNC: 2.2 MG/DL — SIGNIFICANT CHANGE UP (ref 1.6–2.6)
MAGNESIUM SERPL-MCNC: 2.3 MG/DL — SIGNIFICANT CHANGE UP (ref 1.6–2.6)
MANUAL SMEAR VERIFICATION: SIGNIFICANT CHANGE UP
MCHC RBC-ENTMCNC: 31.4 PG — SIGNIFICANT CHANGE UP (ref 27–34)
MCHC RBC-ENTMCNC: 32.3 % — SIGNIFICANT CHANGE UP (ref 32–36)
MCV RBC AUTO: 97.1 FL — SIGNIFICANT CHANGE UP (ref 80–100)
METHOD TYPE: SIGNIFICANT CHANGE UP
MONOCYTES # BLD AUTO: 0.6 K/UL — SIGNIFICANT CHANGE UP (ref 0–0.9)
MONOCYTES NFR BLD AUTO: 4.7 % — SIGNIFICANT CHANGE UP (ref 2–14)
NEUTROPHILS # BLD AUTO: 11.58 K/UL — HIGH (ref 1.8–7.4)
NEUTROPHILS NFR BLD AUTO: 90.5 % — HIGH (ref 43–77)
NRBC # FLD: 0 K/UL — SIGNIFICANT CHANGE UP (ref 0–0)
ORGANISM # SPEC MICROSCOPIC CNT: SIGNIFICANT CHANGE UP
PHOSPHATE SERPL-MCNC: 3.6 MG/DL — SIGNIFICANT CHANGE UP (ref 2.5–4.5)
PHOSPHATE SERPL-MCNC: 3.8 MG/DL — SIGNIFICANT CHANGE UP (ref 2.5–4.5)
PHOSPHATE SERPL-MCNC: SIGNIFICANT CHANGE UP MG/DL (ref 2.5–4.5)
PLATELET # BLD AUTO: 62 K/UL — LOW (ref 150–400)
PMV BLD: 11 FL — SIGNIFICANT CHANGE UP (ref 7–13)
POTASSIUM SERPL-MCNC: 4.9 MMOL/L — SIGNIFICANT CHANGE UP (ref 3.5–5.3)
POTASSIUM SERPL-MCNC: 5.4 MMOL/L — HIGH (ref 3.5–5.3)
POTASSIUM SERPL-MCNC: SIGNIFICANT CHANGE UP MMOL/L (ref 3.5–5.3)
POTASSIUM SERPL-SCNC: 4.9 MMOL/L — SIGNIFICANT CHANGE UP (ref 3.5–5.3)
POTASSIUM SERPL-SCNC: 5.4 MMOL/L — HIGH (ref 3.5–5.3)
POTASSIUM SERPL-SCNC: SIGNIFICANT CHANGE UP MMOL/L (ref 3.5–5.3)
RBC # BLD: 3.09 M/UL — LOW (ref 4.2–5.8)
RBC # FLD: 15.6 % — HIGH (ref 10.3–14.5)
SODIUM SERPL-SCNC: 136 MMOL/L — SIGNIFICANT CHANGE UP (ref 135–145)
SODIUM SERPL-SCNC: 143 MMOL/L — SIGNIFICANT CHANGE UP (ref 135–145)
SPECIMEN SOURCE: SIGNIFICANT CHANGE UP
WBC # BLD: 12.79 K/UL — HIGH (ref 3.8–10.5)
WBC # FLD AUTO: 12.79 K/UL — HIGH (ref 3.8–10.5)

## 2019-09-16 PROCEDURE — 74177 CT ABD & PELVIS W/CONTRAST: CPT | Mod: 26

## 2019-09-16 PROCEDURE — 72125 CT NECK SPINE W/O DYE: CPT | Mod: 26

## 2019-09-16 PROCEDURE — 93010 ELECTROCARDIOGRAM REPORT: CPT

## 2019-09-16 PROCEDURE — 70450 CT HEAD/BRAIN W/O DYE: CPT | Mod: 26

## 2019-09-16 PROCEDURE — 70450 CT HEAD/BRAIN W/O DYE: CPT | Mod: 26,77

## 2019-09-16 PROCEDURE — 99223 1ST HOSP IP/OBS HIGH 75: CPT

## 2019-09-16 PROCEDURE — 99223 1ST HOSP IP/OBS HIGH 75: CPT | Mod: GC

## 2019-09-16 PROCEDURE — 12345: CPT | Mod: NC

## 2019-09-16 RX ORDER — DEXTROSE 50 % IN WATER 50 %
12.5 SYRINGE (ML) INTRAVENOUS ONCE
Refills: 0 | Status: DISCONTINUED | OUTPATIENT
Start: 2019-09-16 | End: 2019-09-26

## 2019-09-16 RX ORDER — DEXTROSE 50 % IN WATER 50 %
25 SYRINGE (ML) INTRAVENOUS ONCE
Refills: 0 | Status: DISCONTINUED | OUTPATIENT
Start: 2019-09-16 | End: 2019-09-26

## 2019-09-16 RX ORDER — INSULIN LISPRO 100/ML
4 VIAL (ML) SUBCUTANEOUS ONCE
Refills: 0 | Status: COMPLETED | OUTPATIENT
Start: 2019-09-16 | End: 2019-09-16

## 2019-09-16 RX ORDER — ALBUTEROL 90 UG/1
10 AEROSOL, METERED ORAL ONCE
Refills: 0 | Status: COMPLETED | OUTPATIENT
Start: 2019-09-16 | End: 2019-09-16

## 2019-09-16 RX ORDER — INSULIN LISPRO 100/ML
VIAL (ML) SUBCUTANEOUS EVERY 6 HOURS
Refills: 0 | Status: DISCONTINUED | OUTPATIENT
Start: 2019-09-16 | End: 2019-09-26

## 2019-09-16 RX ORDER — VANCOMYCIN HCL 1 G
750 VIAL (EA) INTRAVENOUS EVERY 12 HOURS
Refills: 0 | Status: DISCONTINUED | OUTPATIENT
Start: 2019-09-16 | End: 2019-09-16

## 2019-09-16 RX ORDER — FUROSEMIDE 40 MG
40 TABLET ORAL ONCE
Refills: 0 | Status: COMPLETED | OUTPATIENT
Start: 2019-09-16 | End: 2019-09-16

## 2019-09-16 RX ORDER — GLUCAGON INJECTION, SOLUTION 0.5 MG/.1ML
1 INJECTION, SOLUTION SUBCUTANEOUS ONCE
Refills: 0 | Status: DISCONTINUED | OUTPATIENT
Start: 2019-09-16 | End: 2019-09-26

## 2019-09-16 RX ORDER — PIPERACILLIN AND TAZOBACTAM 4; .5 G/20ML; G/20ML
3.38 INJECTION, POWDER, LYOPHILIZED, FOR SOLUTION INTRAVENOUS EVERY 8 HOURS
Refills: 0 | Status: DISCONTINUED | OUTPATIENT
Start: 2019-09-16 | End: 2019-09-16

## 2019-09-16 RX ORDER — INSULIN LISPRO 100/ML
VIAL (ML) SUBCUTANEOUS
Refills: 0 | Status: DISCONTINUED | OUTPATIENT
Start: 2019-09-16 | End: 2019-09-16

## 2019-09-16 RX ORDER — DEXTROSE 50 % IN WATER 50 %
15 SYRINGE (ML) INTRAVENOUS ONCE
Refills: 0 | Status: DISCONTINUED | OUTPATIENT
Start: 2019-09-16 | End: 2019-09-26

## 2019-09-16 RX ORDER — SODIUM CHLORIDE 9 MG/ML
1000 INJECTION, SOLUTION INTRAVENOUS
Refills: 0 | Status: DISCONTINUED | OUTPATIENT
Start: 2019-09-16 | End: 2019-09-26

## 2019-09-16 RX ORDER — MEROPENEM 1 G/30ML
1000 INJECTION INTRAVENOUS EVERY 12 HOURS
Refills: 0 | Status: COMPLETED | OUTPATIENT
Start: 2019-09-16 | End: 2019-09-21

## 2019-09-16 RX ORDER — ALBUTEROL 90 UG/1
10 AEROSOL, METERED ORAL ONCE
Refills: 0 | Status: DISCONTINUED | OUTPATIENT
Start: 2019-09-16 | End: 2019-09-16

## 2019-09-16 RX ORDER — INSULIN LISPRO 100/ML
VIAL (ML) SUBCUTANEOUS AT BEDTIME
Refills: 0 | Status: DISCONTINUED | OUTPATIENT
Start: 2019-09-16 | End: 2019-09-16

## 2019-09-16 RX ADMIN — PIPERACILLIN AND TAZOBACTAM 25 GRAM(S): 4; .5 INJECTION, POWDER, LYOPHILIZED, FOR SOLUTION INTRAVENOUS at 13:30

## 2019-09-16 RX ADMIN — Medication 4 UNIT(S): at 08:29

## 2019-09-16 RX ADMIN — MEROPENEM 100 MILLIGRAM(S): 1 INJECTION INTRAVENOUS at 17:25

## 2019-09-16 RX ADMIN — ALBUTEROL 10 MILLIGRAM(S): 90 AEROSOL, METERED ORAL at 03:10

## 2019-09-16 RX ADMIN — Medication 40 MILLIGRAM(S): at 12:07

## 2019-09-16 NOTE — H&P ADULT - PROBLEM SELECTOR PLAN 1
Acute on chronic SDH. Pt evaluated by neurosx in ED, no need for surgical managment  - repeat CT H in 48-72 hrs to assess for progression  - no chemical prophylaxis  - fall precautions Acute on chronic SDH. Pt evaluated by neurosx in ED, no need for surgical management  - repeat CT H in 48-72 hrs to assess for progression  - no chemical prophylaxis  - fall precautions

## 2019-09-16 NOTE — H&P ADULT - PROBLEM SELECTOR PLAN 5
DVT ppx: SCDs  Diet: New transaminitis, likely in the setting of acute infections   - f/u hep panel  - trend LFTs

## 2019-09-16 NOTE — PROGRESS NOTE ADULT - PROBLEM SELECTOR PLAN 10
- MOLST form in chart reviewed  - DNR/DNI, Comfort measures.  Discussed with daughter Yasmine Hyman (HCP) on phone and she would like to continue with treating infection.

## 2019-09-16 NOTE — PROGRESS NOTE ADULT - PROBLEM SELECTOR PLAN 2
- Pt meets sepsis criteria with hypothermia and leukocytosis  - Pt likely with high risk of aspiration, currently NPO given lethargy  - Will continue with IV Meropenem, can d/c IV vancomycin

## 2019-09-16 NOTE — PROGRESS NOTE ADULT - PROBLEM SELECTOR PLAN 4
- Acute on chronic SDH. Pt evaluated by neurosx in ED, no need for surgical management.    - Per daughter, pt with frequent falls   - repeat CT head in AM   - fall precautions

## 2019-09-16 NOTE — PROGRESS NOTE ADULT - PROBLEM SELECTOR PLAN 6
- New transaminitis, likely in the setting of acute infections   - f/u hep panel (pending), low suspicion of acute viral hepatitis   - trend liver enzymes - New thrombocytopenia, likely in setting of acute infections  - Continue to trend  - transfuse for <10, <15 if febrile

## 2019-09-16 NOTE — H&P ADULT - PROBLEM SELECTOR PLAN 2
CXR demonstrates new L pleural effusion, cannot exclude underlying infection; patchy R midlung opacity may also be infectious. High suspicion for aspiration.  - c/w vanc/zosyn  - f/u sputum cx, u legionella CXR demonstrates new L pleural effusion, cannot exclude underlying infection; patchy R midlung opacity may also be infectious. High suspicion for aspiration.  - c/w vanc/zosyn  - f/u sputum cx, u legionella, MRSA PCR

## 2019-09-16 NOTE — H&P ADULT - NSHPSOCIALHISTORY_GEN_ALL_CORE
Pt lives at [facility]. Pt lives at [facility]. Daughter is HCP. Pt lives at [facility]. Daughter is HCP.  As per previous history, former smoker, no drugs/ETOH

## 2019-09-16 NOTE — PROGRESS NOTE ADULT - PROBLEM SELECTOR PLAN 9
- MOLST form in chart reviewed  - DNR/DNI, Comfort measures.  Discussed with daughter Yasmine Hyman (HCP) on phone and she would like to continue with treating infection. DVT ppx: SCDs  Diet: npo 2/2 mental status. holding oral meds   Pt DNR/DNI. MOLST in chart (sighned 8/14/19 by Yasmine Hyman, daughter and HCP), HCP in chart

## 2019-09-16 NOTE — H&P ADULT - NSHPPHYSICALEXAM_GEN_ALL_CORE
PHYSICAL EXAM:  GENERAL: NAD, well-groomed, well-developed  HEAD:  Atraumatic, Normocephalic  EYES: EOMI, PERRLA, conjunctiva and sclera clear  ENMT: No tonsillar erythema, exudates, or enlargement; Moist mucous membranes  NECK: Supple, No JVD, Normal thyroid  HEART: Regular rate and rhythm; No murmurs, rubs, or gallops  RESPIRATORY: CTA B/L, No W/R/R  ABDOMEN: Soft, Nontender, Nondistended; Bowel sounds present  NEUROLOGY: A&Ox3, nonfocal, moving all extremities  EXTREMITIES:  2+ Peripheral Pulses, No clubbing, cyanosis, or edema  SKIN: warm, dry, normal color, no rash or abnormal lesions PHYSICAL EXAM:  GENERAL: uncomfortable, cachectic, numerous bruises over extremities  HEAD: bruise under R eye  EYES: conjunctiva and sclera clear  ENMT: Moist mucous membranes  NECK: Supple  HEART: Regular rate and rhythm; No murmurs, rubs, or gallops  RESPIRATORY: R-sided crackles  ABDOMEN: Soft, Nontender, Nondistended; Bowel sounds present  NEUROLOGY: A&Ox0, not responding to commands  EXTREMITIES:  2+ Peripheral Pulses, No clubbing, cyanosis, or edema  SKIN: warm, dry, normal color T(C): 36.6 (09-16-19 @ 06:41), Max: 36.6 (09-16-19 @ 06:41)  HR: 75 (09-16-19 @ 06:41) (75 - 84)  BP: 137/63 (09-16-19 @ 06:41) (103/60 - 137/90)  RR: 16 (09-16-19 @ 06:41) (16 - 16)  SpO2: 99% (09-16-19 @ 06:41) (96% - 99%)    Constitutional: uncomfortable, cachectic, numerous bruises over extremities  Ears, Nose, Mouth, and Throat: normal external ears and nose, normal hearing, moist oral mucosa  Eyes: normal conjunctiva, EOMI, PERRL, bruise under R eye  Neck: supple, no JVD  Respiratory: R-sided crackles. Normal respiratory effort  Cardiovascular: RRR, no M/R/G, no edema, 2+ Peripheral Pulses  Gastrointestinal: soft, nontender, nondistended, +BS, no hernia  Skin: warm, dry, no rash  Neurologic: not responding to commands  Musculoskeletal: no clubbing, no cyanosis, no joint swelling  Psychiatric: A&Ox0, no agitation, anxiety

## 2019-09-16 NOTE — H&P ADULT - NSICDXPASTMEDICALHX_GEN_ALL_CORE_FT
PAST MEDICAL HISTORY:  Dementia Dx in 2014    Dementia     DM (diabetes mellitus) on glipizide    Essential hypertension     GIB (gastrointestinal bleeding) due to ASA 20 years ago, no egd/ colonoscopy    History of goiter Rt sided dx'd 5 years ago    [place] (hard of hearing) Refused Hearing Aides    Orthostatic hypotension     Prostate cancer s/p Radiation seeding, in remission for past 25 years    T2DM (type 2 diabetes mellitus)

## 2019-09-16 NOTE — PROGRESS NOTE ADULT - PROBLEM SELECTOR PLAN 8
DVT ppx: SCDs  Diet: npo 2/2 mental status. holding oral meds   Pt DNR/DNI. MOLST in chart (sighned 8/14/19 by Yasmine Hyman, daughter and HCP), HCP in chart - On glipizide at home, would not continue glipizide on discharge   - ISS  - A1c 6.1 from April 2019

## 2019-09-16 NOTE — CONSULT NOTE ADULT - SUBJECTIVE AND OBJECTIVE BOX
89 YO male with pmhx of Dementia, HTN, DM on glipiziede, prostate CA s/p Radiation seeding, in remission for past 25 years, BIBEMS for acute unresponsiveness at assisted living for 1 day. Per EMS, at baseline, pt appears awake and will respond when spoken to and answer simple questions, now requires vigorous stimulation to achieve a response. Patient noted to have fallen sustaining head trauma approximately 3 weeks ago.    WDWN elderly male in NAD  Vital Signs Last 24 Hrs  T(C): 36.1 (15 Sep 2019 17:32), Max: 36.1 (15 Sep 2019 16:30)  T(F): 97 (15 Sep 2019 17:32), Max: 97 (15 Sep 2019 16:30)  HR: 78 (15 Sep 2019 22:24) (78 - 84)  BP: 128/66 (15 Sep 2019 22:24) (103/60 - 129/58)  BP(mean): --  RR: 16 (15 Sep 2019 22:24) (16 - 16)  SpO2: 97% (15 Sep 2019 22:24) (96% - 97%)    Arousable to vigorous tactile stimuli-becomes combative  Uncooperative with exam, not following commands  Opens eyes to tactile stimuli  Pupils 3mm, briskly reactive bilaterally  No facial asymmetry seen  ROSARIO with good strength                          10.0   11.89 )-----------( 68       ( 15 Sep 2019 17:23 )             30.7     09-15    142  |  108<H>  |  44<H>  ----------------------------<  139<H>  5.5<H>   |  25  |  1.55<H>    Ca    9.2      15 Sep 2019 17:23    TPro  6.0  /  Alb  2.8<L>  /  TBili  0.4  /  DBili  x   /  AST  41<H>  /  ALT  69<H>  /  AlkPhos  175<H>  09-15    Urinalysis Basic - ( 15 Sep 2019 17:23 )    Color: YELLOW / Appearance: Lt TURBID / S.014 / pH: 6.0  Gluc: 100 / Ketone: NEGATIVE  / Bili: NEGATIVE / Urobili: NORMAL   Blood: MODERATE / Protein: 70 / Nitrite: POSITIVE   Leuk Esterase: LARGE / RBC: 6-10 / WBC >50   Sq Epi: OCC / Non Sq Epi: x / Bacteria: FEW    < from: Xray Chest 1 View- PORTABLE-Urgent (09.15.19 @ 17:43) >    INTERPRETATION:  leftpleural effusion, new since 2019. cannot   exclude underlying infection. patchy right midlung opacity may be   infectious.    < end of copied text >    Noncontrast head CT: New bilateral subacute SDH in comparison with prior CT, no significant mass effect or shift

## 2019-09-16 NOTE — PROGRESS NOTE ADULT - PROBLEM SELECTOR PLAN 1
- Acute septic encephalopathy 2/2 UTI and PNA (mgmt as below)   - Per daughter, at baseline pt with end stage dementia, but is awake/alert and minimally verbal

## 2019-09-16 NOTE — PROGRESS NOTE ADULT - PROBLEM SELECTOR PLAN 5
- New thrombocytopenia, likely in setting of acute infections  - Continue to trend  - transfuse for <10, <15 if febrile - Give stat dose of Lasix 40mg IV x1  - Check repeat BMP this afternoon, if no resolution of hyperkalemia, give Insulin/D50

## 2019-09-16 NOTE — CONSULT NOTE ADULT - PROBLEM SELECTOR RECOMMENDATION 9
No need for surgical management  Repeat head CT in 48-72 hours to assess progression of SDH  Admit to medicine for management of pneumonia, UTI and other medical comorbidities

## 2019-09-16 NOTE — PROGRESS NOTE ADULT - PROBLEM SELECTOR PLAN 7
- On glipizide at home, would not continue glipizide on discharge   - ISS  - A1c 6.1 from April 2019 - New transaminitis, likely in the setting of acute infections   - f/u hep panel (pending), low suspicion of acute viral hepatitis   - trend liver enzymes

## 2019-09-16 NOTE — H&P ADULT - NSHPLABSRESULTS_GEN_ALL_CORE
.  LABS:                         10.0   11.89 )-----------( 68       ( 15 Sep 2019 17:23 )             30.7         136  |  102  |  46<H>  ----------------------------<  208<H>  Test not performed SPECIMEN GROSSLY HEMOLYZED   |  18<L>  |  1.42<H>    Ca    8.8      16 Sep 2019 04:45  Phos  Test not performed SPECIMEN GROSSLY HEMOLYZED       Mg     2.3         TPro  6.0  /  Alb  2.8<L>  /  TBili  0.4  /  DBili  x   /  AST  41<H>  /  ALT  69<H>  /  AlkPhos  175<H>  09-15    PT/INR - ( 15 Sep 2019 17:23 )   PT: 12.8 SEC;   INR: 1.15          PTT - ( 15 Sep 2019 17:23 )  PTT:40.0 SEC  Urinalysis Basic - ( 15 Sep 2019 17:23 )    Color: YELLOW / Appearance: Lt TURBID / S.014 / pH: 6.0  Gluc: 100 / Ketone: NEGATIVE  / Bili: NEGATIVE / Urobili: NORMAL   Blood: MODERATE / Protein: 70 / Nitrite: POSITIVE   Leuk Esterase: LARGE / RBC: 6-10 / WBC >50   Sq Epi: OCC / Non Sq Epi: x / Bacteria: FEW    < from: CT Cervical Spine No Cont (19 @ 00:24) >    EXAM:  CT CERVICAL SPINE      EXAM:  CT BRAIN        PROCEDURE DATE:  Sep 16 2019         INTERPRETATION:  CLINICAL INFORMATION: Status post fall. Altered mental   status.    TECHNIQUE: Noncontrast CT scan of the head and cervical spine was   performed. Axial images with coronal and sagittal reformatted series were   obtained.    COMPARISON: CT head and C-spine 2019.    FINDINGS:    HEAD:    There are bilateral peripheral crescentic shaped hypodensities new since   2019 representing chronic appearing subdural hematomas. There are   hyperdense foci present within these hypodensities representing acute   hemorrhage.    No mass effect or midline shift.   Prominent ventricles and sulci due to involution changes. Mild to   moderate white matter microvascular ischemic-type changes.  Opacification of the left sphenoid sinus. The mastoid air cells and   middle ear cavities are clear.  No displaced calvarial fracture.    CERVICAL SPINE:  There is no evidence for acute fracture,subluxation, or prevertebral   soft tissue swelling.   Alignment is maintained. Vertebral body heights are maintained.   Multilevel degenerative changes characterized by disc osteophyte   complexes, uncovertebral degenerative change and facet arthropathy. There   is multilevel spinal canal stenosis and multilevel neural foraminal   narrowing. Limited evaluation of spinal canal given CT modality.  Visualized portions of the lungs are clear. Unchanged right multinodular   goiter.    IMPRESSION:  HEAD CT:   Mixed attenuation bilateral convexity extra-axial fluid collections,   likely acute on chronic subdural hemorrhages.  No significant mass effect   or midline shift. No displaced calvarial fracture.  If symptoms persist, consider short interval follow-up head CT or brain   MRI, if there are no MRI contraindications.  CERVICAL SPINE CT:   No evidence for acute fracture or traumatic malalignment. Degenerative   changes.  No significant interval change compared to the prior CTof   19.  < end of copied text > LABS:                         10.0   11.89 )-----------( 68       ( 15 Sep 2019 17:23 )             30.7         136  |  102  |  46<H>  ----------------------------<  208<H>  Test not performed SPECIMEN GROSSLY HEMOLYZED   |  18<L>  |  1.42<H>    Ca    8.8      16 Sep 2019 04:45  Phos  Test not performed SPECIMEN GROSSLY HEMOLYZED       Mg     2.3         TPro  6.0  /  Alb  2.8<L>  /  TBili  0.4  /  DBili  x   /  AST  41<H>  /  ALT  69<H>  /  AlkPhos  175<H>  09-15    PT/INR - ( 15 Sep 2019 17:23 )   PT: 12.8 SEC;   INR: 1.15          PTT - ( 15 Sep 2019 17:23 )  PTT:40.0 SEC  Urinalysis Basic - ( 15 Sep 2019 17:23 )    Color: YELLOW / Appearance: Lt TURBID / S.014 / pH: 6.0  Gluc: 100 / Ketone: NEGATIVE  / Bili: NEGATIVE / Urobili: NORMAL   Blood: MODERATE / Protein: 70 / Nitrite: POSITIVE   Leuk Esterase: LARGE / RBC: 6-10 / WBC >50   Sq Epi: OCC / Non Sq Epi: x / Bacteria: FEW    < from: CT Cervical Spine No Cont (19 @ 00:24) >    EXAM:  CT CERVICAL SPINE      EXAM:  CT BRAIN        PROCEDURE DATE:  Sep 16 2019         INTERPRETATION:  CLINICAL INFORMATION: Status post fall. Altered mental   status.    TECHNIQUE: Noncontrast CT scan of the head and cervical spine was   performed. Axial images with coronal and sagittal reformatted series were   obtained.    COMPARISON: CT head and C-spine 2019.    FINDINGS:    HEAD:    There are bilateral peripheral crescentic shaped hypodensities new since   2019 representing chronic appearing subdural hematomas. There are   hyperdense foci present within these hypodensities representing acute   hemorrhage.    No mass effect or midline shift.   Prominent ventricles and sulci due to involution changes. Mild to   moderate white matter microvascular ischemic-type changes.  Opacification of the left sphenoid sinus. The mastoid air cells and   middle ear cavities are clear.  No displaced calvarial fracture.    CERVICAL SPINE:  There is no evidence for acute fracture,subluxation, or prevertebral   soft tissue swelling.   Alignment is maintained. Vertebral body heights are maintained.   Multilevel degenerative changes characterized by disc osteophyte   complexes, uncovertebral degenerative change and facet arthropathy. There   is multilevel spinal canal stenosis and multilevel neural foraminal   narrowing. Limited evaluation of spinal canal given CT modality.  Visualized portions of the lungs are clear. Unchanged right multinodular   goiter.    IMPRESSION:  HEAD CT:   Mixed attenuation bilateral convexity extra-axial fluid collections,   likely acute on chronic subdural hemorrhages.  No significant mass effect   or midline shift. No displaced calvarial fracture.  If symptoms persist, consider short interval follow-up head CT or brain   MRI, if there are no MRI contraindications.  CERVICAL SPINE CT:   No evidence for acute fracture or traumatic malalignment. Degenerative   changes.  No significant interval change compared to the prior CTof   19.  < end of copied text >

## 2019-09-16 NOTE — H&P ADULT - PROBLEM SELECTOR PLAN 4
On glipizide at home  ISS New thrombocytopenia, likely in setting of acute infections  - trend  - transfuse for <10, <15 if febrile

## 2019-09-16 NOTE — PROGRESS NOTE ADULT - ASSESSMENT
89M w/ end stage dementia (at baseline, minimally verbal, but does communicate), HTN, DM2, prostate CA s/p radiation seeding in remission, BIBEMS for acute septic encephalopathy 2/2 UTI and PNA.

## 2019-09-16 NOTE — H&P ADULT - ASSESSMENT
Pt is a 89 yo M w/ PMHx dementia, HTN, DM2, prostate CA s/p radiation seeding in remission, BIBEMS for acute unresponsiveness at assisted living facility x 1 day. Pt is a 89 yo M w/ PMHx dementia, HTN, DM2, prostate CA s/p radiation seeding in remission, BIBEMS for acute unresponsiveness at assisted living facility x 1 day. Pt found to have +UA and evidence of b/l PNA on CXR.

## 2019-09-16 NOTE — PROGRESS NOTE ADULT - PROBLEM SELECTOR PLAN 3
- Pt with gram negative libia bacteremia, with source likely from UTI   - Repeat Bcx in AM   - c/w IV meropenem  - f/u Ucx, Bcx speciation and sensitivities

## 2019-09-16 NOTE — H&P ADULT - PROBLEM SELECTOR PLAN 7
DVT ppx: SCDs  Diet: npo 2/2 mental status. holding oral meds   Pt DNR/DNI. MOLST in chart. DVT ppx: SCDs  Diet: npo 2/2 mental status. holding oral meds   Pt DNR/DNI. MOLST in chart (sighned 8/14/19 by Yasmine Hyman, daughter and HCP), HCP in chart

## 2019-09-16 NOTE — CONSULT NOTE ADULT - ASSESSMENT
89 YO male with subacute SDH consistent with history of fall 3 weeks ago, not likely the primary cause of the patient's declining mental status.

## 2019-09-16 NOTE — H&P ADULT - HISTORY OF PRESENT ILLNESS
Pt is a 89 yo M w/ PMHx dementia, HTN, DM2, prostate CA s/p radiation seeding in remission, BIBEMS for acute unresponsiveness at assisted living facility x 1 day. Per EMS, pt can respond to simple questions at baseline. Pt sustained a fall three weeks ago.    In ED: VS Temp 97, HR 80, /32 RR 16 96% SpO2. WBC 11.89. Hgb 10, Plt 68, K 5.5, BUN 44/Cr 1.55, Albumin 2.8, Alk phos 175, AST 41, ALT 69. lactate 1.5. UA positive. tox screen negative. RVP negative. Bcx, Ucx pending. CT head showing acute on chronic subdural hemorrhages without significant mass effect or midline shift. CT A/P demonstrates likely cystitis. CXR shows new L pleural effusion, cannot exclude underlying infection; patchy R midlung opacity may also be infectious. S/p vanc and zosyn, 1L NS, albuterol neb x1 *****Patient unable to provide a subjective history due to unresponsiveness, history obtained from medical records, ER*****    Pt is a 89 yo M w/ PMHx dementia, HTN, DM2, prostate CA s/p radiation seeding in remission, BIBEMS for acute unresponsiveness at assisted living facility x 1 day. Per EMS, pt can respond to simple questions at baseline. Pt sustained a fall three weeks ago.    In ED: VS Temp 97, HR 80, /32 RR 16 96% SpO2. WBC 11.89. Hgb 10, Plt 68, K 5.5, BUN 44/Cr 1.55, Albumin 2.8, Alk phos 175, AST 41, ALT 69. lactate 1.5. UA positive. tox screen negative. RVP negative. Bcx, Ucx pending. CT head showing acute on chronic subdural hemorrhages without significant mass effect or midline shift. CT A/P demonstrates likely cystitis. CXR shows new L pleural effusion, cannot exclude underlying infection; patchy R midlung opacity may also be infectious. S/p vanc and zosyn, 1L NS, albuterol neb x1

## 2019-09-16 NOTE — H&P ADULT - PROBLEM SELECTOR PLAN 3
UA positive.   - c/w zosyn  - f/u Ucx, Bcx UA positive and evidence of cystitis on CT A/P  - c/w zosyn  - f/u Ucx, Bcx  - midodrine being held as pt npo

## 2019-09-17 LAB
-  AMIKACIN: SIGNIFICANT CHANGE UP
-  AMPICILLIN/SULBACTAM: SIGNIFICANT CHANGE UP
-  AMPICILLIN: SIGNIFICANT CHANGE UP
-  AZTREONAM: SIGNIFICANT CHANGE UP
-  CEFAZOLIN: SIGNIFICANT CHANGE UP
-  CEFEPIME: SIGNIFICANT CHANGE UP
-  CEFOXITIN: SIGNIFICANT CHANGE UP
-  CEFTAZIDIME: SIGNIFICANT CHANGE UP
-  CEFTRIAXONE: SIGNIFICANT CHANGE UP
-  ERTAPENEM: SIGNIFICANT CHANGE UP
-  GENTAMICIN: SIGNIFICANT CHANGE UP
-  IMIPENEM: SIGNIFICANT CHANGE UP
-  LEVOFLOXACIN: SIGNIFICANT CHANGE UP
-  MEROPENEM: SIGNIFICANT CHANGE UP
-  NITROFURANTOIN: SIGNIFICANT CHANGE UP
-  PIPERACILLIN/TAZOBACTAM: SIGNIFICANT CHANGE UP
-  TIGECYCLINE: SIGNIFICANT CHANGE UP
-  TOBRAMYCIN: SIGNIFICANT CHANGE UP
-  TRIMETHOPRIM/SULFAMETHOXAZOLE: SIGNIFICANT CHANGE UP
ANION GAP SERPL CALC-SCNC: 14 MMO/L — SIGNIFICANT CHANGE UP (ref 7–14)
BACTERIA UR CULT: SIGNIFICANT CHANGE UP
BASOPHILS # BLD AUTO: 0.03 K/UL — SIGNIFICANT CHANGE UP (ref 0–0.2)
BASOPHILS NFR BLD AUTO: 0.2 % — SIGNIFICANT CHANGE UP (ref 0–2)
BUN SERPL-MCNC: 52 MG/DL — HIGH (ref 7–23)
CALCIUM SERPL-MCNC: 8.8 MG/DL — SIGNIFICANT CHANGE UP (ref 8.4–10.5)
CHLORIDE SERPL-SCNC: 108 MMOL/L — HIGH (ref 98–107)
CO2 SERPL-SCNC: 23 MMOL/L — SIGNIFICANT CHANGE UP (ref 22–31)
CREAT SERPL-MCNC: 1.7 MG/DL — HIGH (ref 0.5–1.3)
EOSINOPHIL # BLD AUTO: 0.01 K/UL — SIGNIFICANT CHANGE UP (ref 0–0.5)
EOSINOPHIL NFR BLD AUTO: 0.1 % — SIGNIFICANT CHANGE UP (ref 0–6)
GLUCOSE BLDC GLUCOMTR-MCNC: 116 MG/DL — HIGH (ref 70–99)
GLUCOSE BLDC GLUCOMTR-MCNC: 135 MG/DL — HIGH (ref 70–99)
GLUCOSE BLDC GLUCOMTR-MCNC: 136 MG/DL — HIGH (ref 70–99)
GLUCOSE BLDC GLUCOMTR-MCNC: 163 MG/DL — HIGH (ref 70–99)
GLUCOSE SERPL-MCNC: 112 MG/DL — HIGH (ref 70–99)
HAV IGM SER-ACNC: NONREACTIVE — SIGNIFICANT CHANGE UP
HBA1C BLD-MCNC: 8.1 % — HIGH (ref 4–5.6)
HBV CORE IGM SER-ACNC: NONREACTIVE — SIGNIFICANT CHANGE UP
HBV SURFACE AG SER-ACNC: NONREACTIVE — SIGNIFICANT CHANGE UP
HCT VFR BLD CALC: 30.1 % — LOW (ref 39–50)
HCV AB S/CO SERPL IA: 0.13 S/CO — SIGNIFICANT CHANGE UP (ref 0–0.99)
HCV AB SERPL-IMP: SIGNIFICANT CHANGE UP
HGB BLD-MCNC: 9.9 G/DL — LOW (ref 13–17)
IMM GRANULOCYTES NFR BLD AUTO: 0.8 % — SIGNIFICANT CHANGE UP (ref 0–1.5)
LYMPHOCYTES # BLD AUTO: 0.55 K/UL — LOW (ref 1–3.3)
LYMPHOCYTES # BLD AUTO: 4.3 % — LOW (ref 13–44)
MAGNESIUM SERPL-MCNC: 2.2 MG/DL — SIGNIFICANT CHANGE UP (ref 1.6–2.6)
MCHC RBC-ENTMCNC: 31.4 PG — SIGNIFICANT CHANGE UP (ref 27–34)
MCHC RBC-ENTMCNC: 32.9 % — SIGNIFICANT CHANGE UP (ref 32–36)
MCV RBC AUTO: 95.6 FL — SIGNIFICANT CHANGE UP (ref 80–100)
METHOD TYPE: SIGNIFICANT CHANGE UP
MONOCYTES # BLD AUTO: 0.82 K/UL — SIGNIFICANT CHANGE UP (ref 0–0.9)
MONOCYTES NFR BLD AUTO: 6.5 % — SIGNIFICANT CHANGE UP (ref 2–14)
NEUTROPHILS # BLD AUTO: 11.15 K/UL — HIGH (ref 1.8–7.4)
NEUTROPHILS NFR BLD AUTO: 88.1 % — HIGH (ref 43–77)
NRBC # FLD: 0 K/UL — SIGNIFICANT CHANGE UP (ref 0–0)
ORGANISM # SPEC MICROSCOPIC CNT: SIGNIFICANT CHANGE UP
PLATELET # BLD AUTO: 65 K/UL — LOW (ref 150–400)
PMV BLD: 11 FL — SIGNIFICANT CHANGE UP (ref 7–13)
POTASSIUM SERPL-MCNC: 4.7 MMOL/L — SIGNIFICANT CHANGE UP (ref 3.5–5.3)
POTASSIUM SERPL-SCNC: 4.7 MMOL/L — SIGNIFICANT CHANGE UP (ref 3.5–5.3)
RBC # BLD: 3.15 M/UL — LOW (ref 4.2–5.8)
RBC # FLD: 15.8 % — HIGH (ref 10.3–14.5)
SODIUM SERPL-SCNC: 145 MMOL/L — SIGNIFICANT CHANGE UP (ref 135–145)
WBC # BLD: 12.66 K/UL — HIGH (ref 3.8–10.5)
WBC # FLD AUTO: 12.66 K/UL — HIGH (ref 3.8–10.5)

## 2019-09-17 RX ADMIN — MEROPENEM 100 MILLIGRAM(S): 1 INJECTION INTRAVENOUS at 16:59

## 2019-09-17 RX ADMIN — MEROPENEM 100 MILLIGRAM(S): 1 INJECTION INTRAVENOUS at 05:31

## 2019-09-18 LAB
-  AMIKACIN: SIGNIFICANT CHANGE UP
-  AMPICILLIN/SULBACTAM: SIGNIFICANT CHANGE UP
-  AMPICILLIN: SIGNIFICANT CHANGE UP
-  AZTREONAM: SIGNIFICANT CHANGE UP
-  CEFAZOLIN: SIGNIFICANT CHANGE UP
-  CEFEPIME: SIGNIFICANT CHANGE UP
-  CEFOXITIN: SIGNIFICANT CHANGE UP
-  CEFTAZIDIME: SIGNIFICANT CHANGE UP
-  CEFTRIAXONE: SIGNIFICANT CHANGE UP
-  CIPROFLOXACIN: SIGNIFICANT CHANGE UP
-  ERTAPENEM: SIGNIFICANT CHANGE UP
-  GENTAMICIN: SIGNIFICANT CHANGE UP
-  IMIPENEM: SIGNIFICANT CHANGE UP
-  LEVOFLOXACIN: SIGNIFICANT CHANGE UP
-  MEROPENEM: SIGNIFICANT CHANGE UP
-  PIPERACILLIN/TAZOBACTAM: SIGNIFICANT CHANGE UP
-  TIGECYCLINE: SIGNIFICANT CHANGE UP
-  TOBRAMYCIN: SIGNIFICANT CHANGE UP
-  TRIMETHOPRIM/SULFAMETHOXAZOLE: SIGNIFICANT CHANGE UP
ANION GAP SERPL CALC-SCNC: 18 MMO/L — HIGH (ref 7–14)
BACTERIA BLD CULT: SIGNIFICANT CHANGE UP
BASOPHILS # BLD AUTO: 0.02 K/UL — SIGNIFICANT CHANGE UP (ref 0–0.2)
BASOPHILS NFR BLD AUTO: 0.2 % — SIGNIFICANT CHANGE UP (ref 0–2)
BUN SERPL-MCNC: 70 MG/DL — HIGH (ref 7–23)
CALCIUM SERPL-MCNC: 9.2 MG/DL — SIGNIFICANT CHANGE UP (ref 8.4–10.5)
CHLORIDE SERPL-SCNC: 109 MMOL/L — HIGH (ref 98–107)
CO2 SERPL-SCNC: 23 MMOL/L — SIGNIFICANT CHANGE UP (ref 22–31)
CREAT SERPL-MCNC: 1.62 MG/DL — HIGH (ref 0.5–1.3)
E COLI DNA BLD POS QL NAA+NON-PROBE: SIGNIFICANT CHANGE UP
EOSINOPHIL # BLD AUTO: 0.01 K/UL — SIGNIFICANT CHANGE UP (ref 0–0.5)
EOSINOPHIL NFR BLD AUTO: 0.1 % — SIGNIFICANT CHANGE UP (ref 0–6)
GLUCOSE BLDC GLUCOMTR-MCNC: 136 MG/DL — HIGH (ref 70–99)
GLUCOSE BLDC GLUCOMTR-MCNC: 142 MG/DL — HIGH (ref 70–99)
GLUCOSE BLDC GLUCOMTR-MCNC: 143 MG/DL — HIGH (ref 70–99)
GLUCOSE BLDC GLUCOMTR-MCNC: 193 MG/DL — HIGH (ref 70–99)
GLUCOSE SERPL-MCNC: 127 MG/DL — HIGH (ref 70–99)
HCT VFR BLD CALC: 33 % — LOW (ref 39–50)
HGB BLD-MCNC: 10.6 G/DL — LOW (ref 13–17)
IMM GRANULOCYTES NFR BLD AUTO: 1.4 % — SIGNIFICANT CHANGE UP (ref 0–1.5)
LYMPHOCYTES # BLD AUTO: 0.83 K/UL — LOW (ref 1–3.3)
LYMPHOCYTES # BLD AUTO: 7.2 % — LOW (ref 13–44)
MAGNESIUM SERPL-MCNC: 2.5 MG/DL — SIGNIFICANT CHANGE UP (ref 1.6–2.6)
MCHC RBC-ENTMCNC: 30.9 PG — SIGNIFICANT CHANGE UP (ref 27–34)
MCHC RBC-ENTMCNC: 32.1 % — SIGNIFICANT CHANGE UP (ref 32–36)
MCV RBC AUTO: 96.2 FL — SIGNIFICANT CHANGE UP (ref 80–100)
METHOD TYPE: SIGNIFICANT CHANGE UP
MONOCYTES # BLD AUTO: 0.85 K/UL — SIGNIFICANT CHANGE UP (ref 0–0.9)
MONOCYTES NFR BLD AUTO: 7.3 % — SIGNIFICANT CHANGE UP (ref 2–14)
NEUTROPHILS # BLD AUTO: 9.72 K/UL — HIGH (ref 1.8–7.4)
NEUTROPHILS NFR BLD AUTO: 83.8 % — HIGH (ref 43–77)
NRBC # FLD: 0 K/UL — SIGNIFICANT CHANGE UP (ref 0–0)
PLATELET # BLD AUTO: 71 K/UL — LOW (ref 150–400)
PMV BLD: 10.4 FL — SIGNIFICANT CHANGE UP (ref 7–13)
POTASSIUM SERPL-MCNC: 4.5 MMOL/L — SIGNIFICANT CHANGE UP (ref 3.5–5.3)
POTASSIUM SERPL-SCNC: 4.5 MMOL/L — SIGNIFICANT CHANGE UP (ref 3.5–5.3)
RBC # BLD: 3.43 M/UL — LOW (ref 4.2–5.8)
RBC # FLD: 15.9 % — HIGH (ref 10.3–14.5)
SODIUM SERPL-SCNC: 150 MMOL/L — HIGH (ref 135–145)
SPECIMEN SOURCE: SIGNIFICANT CHANGE UP
WBC # BLD: 11.59 K/UL — HIGH (ref 3.8–10.5)
WBC # FLD AUTO: 11.59 K/UL — HIGH (ref 3.8–10.5)

## 2019-09-18 RX ORDER — SODIUM CHLORIDE 9 MG/ML
1000 INJECTION, SOLUTION INTRAVENOUS
Refills: 0 | Status: DISCONTINUED | OUTPATIENT
Start: 2019-09-18 | End: 2019-09-18

## 2019-09-18 RX ORDER — SODIUM CHLORIDE 9 MG/ML
1000 INJECTION INTRAMUSCULAR; INTRAVENOUS; SUBCUTANEOUS
Refills: 0 | Status: DISCONTINUED | OUTPATIENT
Start: 2019-09-18 | End: 2019-09-19

## 2019-09-18 RX ADMIN — MEROPENEM 100 MILLIGRAM(S): 1 INJECTION INTRAVENOUS at 06:17

## 2019-09-18 RX ADMIN — Medication 2: at 12:24

## 2019-09-18 RX ADMIN — SODIUM CHLORIDE 75 MILLILITER(S): 9 INJECTION INTRAMUSCULAR; INTRAVENOUS; SUBCUTANEOUS at 12:24

## 2019-09-18 RX ADMIN — Medication 2: at 00:11

## 2019-09-18 RX ADMIN — MEROPENEM 100 MILLIGRAM(S): 1 INJECTION INTRAVENOUS at 18:25

## 2019-09-18 RX ADMIN — SODIUM CHLORIDE 50 MILLILITER(S): 9 INJECTION, SOLUTION INTRAVENOUS at 08:57

## 2019-09-19 LAB
ANION GAP SERPL CALC-SCNC: 12 MMO/L — SIGNIFICANT CHANGE UP (ref 7–14)
ANION GAP SERPL CALC-SCNC: 13 MMO/L — SIGNIFICANT CHANGE UP (ref 7–14)
BASOPHILS # BLD AUTO: 0.04 K/UL — SIGNIFICANT CHANGE UP (ref 0–0.2)
BASOPHILS NFR BLD AUTO: 0.3 % — SIGNIFICANT CHANGE UP (ref 0–2)
BUN SERPL-MCNC: 79 MG/DL — HIGH (ref 7–23)
BUN SERPL-MCNC: 81 MG/DL — HIGH (ref 7–23)
CALCIUM SERPL-MCNC: 9 MG/DL — SIGNIFICANT CHANGE UP (ref 8.4–10.5)
CHLORIDE SERPL-SCNC: 117 MMOL/L — HIGH (ref 98–107)
CHLORIDE SERPL-SCNC: 120 MMOL/L — HIGH (ref 98–107)
CO2 SERPL-SCNC: 25 MMOL/L — SIGNIFICANT CHANGE UP (ref 22–31)
CO2 SERPL-SCNC: 26 MMOL/L — SIGNIFICANT CHANGE UP (ref 22–31)
CREAT SERPL-MCNC: 1.5 MG/DL — HIGH (ref 0.5–1.3)
CREAT SERPL-MCNC: 1.55 MG/DL — HIGH (ref 0.5–1.3)
EOSINOPHIL # BLD AUTO: 0.02 K/UL — SIGNIFICANT CHANGE UP (ref 0–0.5)
EOSINOPHIL NFR BLD AUTO: 0.2 % — SIGNIFICANT CHANGE UP (ref 0–6)
GLUCOSE BLDC GLUCOMTR-MCNC: 117 MG/DL — HIGH (ref 70–99)
GLUCOSE BLDC GLUCOMTR-MCNC: 152 MG/DL — HIGH (ref 70–99)
GLUCOSE BLDC GLUCOMTR-MCNC: 173 MG/DL — HIGH (ref 70–99)
GLUCOSE BLDC GLUCOMTR-MCNC: 193 MG/DL — HIGH (ref 70–99)
GLUCOSE BLDC GLUCOMTR-MCNC: 89 MG/DL — SIGNIFICANT CHANGE UP (ref 70–99)
GLUCOSE SERPL-MCNC: 116 MG/DL — HIGH (ref 70–99)
GLUCOSE SERPL-MCNC: 88 MG/DL — SIGNIFICANT CHANGE UP (ref 70–99)
HCT VFR BLD CALC: 33.8 % — LOW (ref 39–50)
HGB BLD-MCNC: 10.5 G/DL — LOW (ref 13–17)
IMM GRANULOCYTES NFR BLD AUTO: 1.9 % — HIGH (ref 0–1.5)
LYMPHOCYTES # BLD AUTO: 0.85 K/UL — LOW (ref 1–3.3)
LYMPHOCYTES # BLD AUTO: 6.8 % — LOW (ref 13–44)
MAGNESIUM SERPL-MCNC: 2.6 MG/DL — SIGNIFICANT CHANGE UP (ref 1.6–2.6)
MAGNESIUM SERPL-MCNC: 2.7 MG/DL — HIGH (ref 1.6–2.6)
MCHC RBC-ENTMCNC: 30.7 PG — SIGNIFICANT CHANGE UP (ref 27–34)
MCHC RBC-ENTMCNC: 31.1 % — LOW (ref 32–36)
MCV RBC AUTO: 98.8 FL — SIGNIFICANT CHANGE UP (ref 80–100)
MONOCYTES # BLD AUTO: 0.84 K/UL — SIGNIFICANT CHANGE UP (ref 0–0.9)
MONOCYTES NFR BLD AUTO: 6.8 % — SIGNIFICANT CHANGE UP (ref 2–14)
NEUTROPHILS # BLD AUTO: 10.44 K/UL — HIGH (ref 1.8–7.4)
NEUTROPHILS NFR BLD AUTO: 84 % — HIGH (ref 43–77)
NRBC # FLD: 0 K/UL — SIGNIFICANT CHANGE UP (ref 0–0)
PHOSPHATE SERPL-MCNC: 3.3 MG/DL — SIGNIFICANT CHANGE UP (ref 2.5–4.5)
PLATELET # BLD AUTO: 88 K/UL — LOW (ref 150–400)
PMV BLD: 10.9 FL — SIGNIFICANT CHANGE UP (ref 7–13)
POTASSIUM SERPL-MCNC: 4.1 MMOL/L — SIGNIFICANT CHANGE UP (ref 3.5–5.3)
POTASSIUM SERPL-MCNC: 4.2 MMOL/L — SIGNIFICANT CHANGE UP (ref 3.5–5.3)
POTASSIUM SERPL-SCNC: 4.1 MMOL/L — SIGNIFICANT CHANGE UP (ref 3.5–5.3)
POTASSIUM SERPL-SCNC: 4.2 MMOL/L — SIGNIFICANT CHANGE UP (ref 3.5–5.3)
RBC # BLD: 3.42 M/UL — LOW (ref 4.2–5.8)
RBC # FLD: 15.6 % — HIGH (ref 10.3–14.5)
SODIUM SERPL-SCNC: 155 MMOL/L — HIGH (ref 135–145)
SODIUM SERPL-SCNC: 158 MMOL/L — HIGH (ref 135–145)
WBC # BLD: 12.43 K/UL — HIGH (ref 3.8–10.5)
WBC # FLD AUTO: 12.43 K/UL — HIGH (ref 3.8–10.5)

## 2019-09-19 RX ORDER — SODIUM CHLORIDE 9 MG/ML
1000 INJECTION, SOLUTION INTRAVENOUS
Refills: 0 | Status: DISCONTINUED | OUTPATIENT
Start: 2019-09-19 | End: 2019-09-20

## 2019-09-19 RX ADMIN — SODIUM CHLORIDE 75 MILLILITER(S): 9 INJECTION INTRAMUSCULAR; INTRAVENOUS; SUBCUTANEOUS at 08:41

## 2019-09-19 RX ADMIN — MEROPENEM 100 MILLIGRAM(S): 1 INJECTION INTRAVENOUS at 06:21

## 2019-09-19 RX ADMIN — SODIUM CHLORIDE 75 MILLILITER(S): 9 INJECTION, SOLUTION INTRAVENOUS at 13:41

## 2019-09-19 RX ADMIN — MEROPENEM 100 MILLIGRAM(S): 1 INJECTION INTRAVENOUS at 18:00

## 2019-09-19 RX ADMIN — Medication 2: at 17:59

## 2019-09-19 RX ADMIN — Medication 2: at 02:10

## 2019-09-19 NOTE — DIETITIAN INITIAL EVALUATION ADULT. - OTHER INFO
Nutrition consult received for assessment.  Pt is a 89 yo M w/ PMHx dementia, HTN, DM2, prostate CA s/p radiation seeding in remission, BIBEMS  for acute septic encephalopathy 2/2 UTI and PNA. Pt is s/p speech and swallow evaluation 9/19, which founds PO to be contraindicated at this time given severity of oropharyngeal phase deficits and Non-Oral means of nutrition/ hydration/ medication  recommended. Pt has been NPO since admission 9/16. Decision on NGT pending.     Per review of patient's medical record, patient was 59kgs 8/14.  Current weight is 61.3 kgs.

## 2019-09-19 NOTE — DIETITIAN INITIAL EVALUATION ADULT. - PERTINENT LABORATORY DATA
09-19 Na155 mmol/L<H> Glu 88 mg/dL K+ 4.1 mmol/L Cr  1.55 mg/dL<H> BUN 81 mg/dL<H> 09-16 Phos 3.8 mg/dL 09-15 Alb 2.8 g/dL<L> 09-17 [id number] 8.1 %<H>

## 2019-09-19 NOTE — DIETITIAN INITIAL EVALUATION ADULT. - DIET TYPE
Consider the provision of NGT feeds if consistent with patient/family wishes and if medically feasible.  RDN remains available for EN recommendations once decision regarding feeding modality is made.

## 2019-09-19 NOTE — DIETITIAN INITIAL EVALUATION ADULT. - PERTINENT MEDS FT
MEDICATIONS  (STANDING):  dextrose 5%. 1000 milliLiter(s) (50 mL/Hr) IV Continuous <Continuous>  dextrose 5%. 1000 milliLiter(s) (75 mL/Hr) IV Continuous <Continuous>  dextrose 50% Injectable 12.5 Gram(s) IV Push once  dextrose 50% Injectable 25 Gram(s) IV Push once  dextrose 50% Injectable 25 Gram(s) IV Push once  insulin lispro (HumaLOG) corrective regimen sliding scale   SubCutaneous every 6 hours  meropenem  IVPB 1000 milliGRAM(s) IV Intermittent every 12 hours

## 2019-09-19 NOTE — SWALLOW BEDSIDE ASSESSMENT ADULT - SWALLOW EVAL: DIAGNOSIS
1. Moderate to severe oral phase dysphagia for puree consistency and honey thick liquids marked by reduced stripping of bolus from utensil, reduced bolus manipulation with slow lingual motion and reduced anterior-posterior transport and suspected posterior loss of bolus. Reduced oral clearance for puree consistency noted marked by mild-mod lingual residue post primary swallow 2. Severe pharyngeal phase dysphagia for puree consistency and honey thick liquids marked by reduced and delayed laryngeal elevation upon digital palpation. Immediate evidence of throat clear and wet vocal quality noted subsequent deglutition indicative of laryngeal penetration/aspiration.

## 2019-09-20 LAB
ANION GAP SERPL CALC-SCNC: 13 MMO/L — SIGNIFICANT CHANGE UP (ref 7–14)
BASOPHILS # BLD AUTO: 0.02 K/UL — SIGNIFICANT CHANGE UP (ref 0–0.2)
BASOPHILS NFR BLD AUTO: 0.2 % — SIGNIFICANT CHANGE UP (ref 0–2)
BUN SERPL-MCNC: 74 MG/DL — HIGH (ref 7–23)
CALCIUM SERPL-MCNC: 8.6 MG/DL — SIGNIFICANT CHANGE UP (ref 8.4–10.5)
CHLORIDE SERPL-SCNC: 118 MMOL/L — HIGH (ref 98–107)
CO2 SERPL-SCNC: 23 MMOL/L — SIGNIFICANT CHANGE UP (ref 22–31)
CREAT SERPL-MCNC: 1.33 MG/DL — HIGH (ref 0.5–1.3)
EOSINOPHIL # BLD AUTO: 0.06 K/UL — SIGNIFICANT CHANGE UP (ref 0–0.5)
EOSINOPHIL NFR BLD AUTO: 0.5 % — SIGNIFICANT CHANGE UP (ref 0–6)
GLUCOSE BLDC GLUCOMTR-MCNC: 130 MG/DL — HIGH (ref 70–99)
GLUCOSE BLDC GLUCOMTR-MCNC: 143 MG/DL — HIGH (ref 70–99)
GLUCOSE BLDC GLUCOMTR-MCNC: 145 MG/DL — HIGH (ref 70–99)
GLUCOSE BLDC GLUCOMTR-MCNC: 151 MG/DL — HIGH (ref 70–99)
GLUCOSE SERPL-MCNC: 155 MG/DL — HIGH (ref 70–99)
HCT VFR BLD CALC: 31.5 % — LOW (ref 39–50)
HGB BLD-MCNC: 10.1 G/DL — LOW (ref 13–17)
IMM GRANULOCYTES NFR BLD AUTO: 1.8 % — HIGH (ref 0–1.5)
LYMPHOCYTES # BLD AUTO: 0.91 K/UL — LOW (ref 1–3.3)
LYMPHOCYTES # BLD AUTO: 7.8 % — LOW (ref 13–44)
MAGNESIUM SERPL-MCNC: 2.5 MG/DL — SIGNIFICANT CHANGE UP (ref 1.6–2.6)
MCHC RBC-ENTMCNC: 31.5 PG — SIGNIFICANT CHANGE UP (ref 27–34)
MCHC RBC-ENTMCNC: 32.1 % — SIGNIFICANT CHANGE UP (ref 32–36)
MCV RBC AUTO: 98.1 FL — SIGNIFICANT CHANGE UP (ref 80–100)
MONOCYTES # BLD AUTO: 0.78 K/UL — SIGNIFICANT CHANGE UP (ref 0–0.9)
MONOCYTES NFR BLD AUTO: 6.7 % — SIGNIFICANT CHANGE UP (ref 2–14)
NEUTROPHILS # BLD AUTO: 9.66 K/UL — HIGH (ref 1.8–7.4)
NEUTROPHILS NFR BLD AUTO: 83 % — HIGH (ref 43–77)
NRBC # FLD: 0 K/UL — SIGNIFICANT CHANGE UP (ref 0–0)
PHOSPHATE SERPL-MCNC: 2.7 MG/DL — SIGNIFICANT CHANGE UP (ref 2.5–4.5)
PLATELET # BLD AUTO: 95 K/UL — LOW (ref 150–400)
PMV BLD: 11.2 FL — SIGNIFICANT CHANGE UP (ref 7–13)
POTASSIUM SERPL-MCNC: 3.8 MMOL/L — SIGNIFICANT CHANGE UP (ref 3.5–5.3)
POTASSIUM SERPL-SCNC: 3.8 MMOL/L — SIGNIFICANT CHANGE UP (ref 3.5–5.3)
RBC # BLD: 3.21 M/UL — LOW (ref 4.2–5.8)
RBC # FLD: 15.3 % — HIGH (ref 10.3–14.5)
SODIUM SERPL-SCNC: 154 MMOL/L — HIGH (ref 135–145)
WBC # BLD: 11.64 K/UL — HIGH (ref 3.8–10.5)
WBC # FLD AUTO: 11.64 K/UL — HIGH (ref 3.8–10.5)

## 2019-09-20 PROCEDURE — 71045 X-RAY EXAM CHEST 1 VIEW: CPT | Mod: 26

## 2019-09-20 PROCEDURE — 71045 X-RAY EXAM CHEST 1 VIEW: CPT | Mod: 26,77

## 2019-09-20 RX ORDER — SODIUM CHLORIDE 9 MG/ML
1000 INJECTION, SOLUTION INTRAVENOUS
Refills: 0 | Status: COMPLETED | OUTPATIENT
Start: 2019-09-20 | End: 2019-09-20

## 2019-09-20 RX ADMIN — Medication 2: at 05:51

## 2019-09-20 RX ADMIN — Medication 2: at 00:08

## 2019-09-20 RX ADMIN — MEROPENEM 100 MILLIGRAM(S): 1 INJECTION INTRAVENOUS at 19:05

## 2019-09-20 RX ADMIN — MEROPENEM 100 MILLIGRAM(S): 1 INJECTION INTRAVENOUS at 05:51

## 2019-09-20 RX ADMIN — SODIUM CHLORIDE 75 MILLILITER(S): 9 INJECTION, SOLUTION INTRAVENOUS at 10:57

## 2019-09-20 NOTE — PROVIDER CONTACT NOTE (OTHER) - ASSESSMENT
Patients blood cultures positive; gram negative rods
Patients temper 93.8 axillary and 93.3 rectally
Patient stable

## 2019-09-20 NOTE — PROVIDER CONTACT NOTE (OTHER) - SITUATION
Patient admitted for pneumonia
Patient pulling on NG tube after placement.
Patient admitted for pneumonia

## 2019-09-20 NOTE — PROVIDER CONTACT NOTE (OTHER) - BACKGROUND
Hx; DM, Dementia, orthostatic hypotension, GIB, [place], hx of goiter, prostate CA, htn
Patient in enhanced care for safety.NG tube started as per family request
Hx; DM, Dementia, orthostatic hypotension, GIB, [place], hx of goiter, prostate CA, htn

## 2019-09-20 NOTE — PROVIDER CONTACT NOTE (OTHER) - RECOMMENDATIONS
Blood cultures from 9/16 sent. Pending new collection 9/17AM. Awaiting any further interventions
Extra blankets and warming packs applied. Awaiting any further  intervention/ recommendations
Will continue to monitor patient

## 2019-09-20 NOTE — SWALLOW BEDSIDE ASSESSMENT ADULT - SWALLOW EVAL: DIAGNOSIS
Patient demonstrated Severe Oropharyngeal Dysphagia exacerbated by reduced recognition to the feeding task and overall cognitive impairment. Oral Stage was marked by inconsistent oral acceptance with intermittent tight labial seal upon PO presentations, and decreased lingual motion with prolonged bolus collection and transfer exacerbated by oral holding of the bolus for limited trials of puree and honey-thick liquid. Patient did not consistently trigger a pharyngeal swallow despite max cueing/prompting from clinician. Congested upper airway sounds noted after a swallow was eventually triggered suggestive of laryngeal penetration vs aspiration. Given above clinical presentation, patient judged to be at high risk for aspiration/choking with all PO intake at this time.

## 2019-09-20 NOTE — CHART NOTE - NSCHARTNOTEFT_GEN_A_CORE
Pt failed repeat bedside speech and swallow evaluation this morning. Case again discussed with Pts daughter Yasmine and wife Mary, would like to proceed with NGT for nutrition at this time. GOC discussion had with Pts wife and daughter, would not want long term feeding tube/PEG, but would like to place NGT short term to assess for improvement with additional nutrition. NGT placed with audible stomach bubble. CXR ordered to confirm in place. Pt with h/o dementia, A+O x O at baseline, attempting to remove NGT following placement. NGT secured to Pts forehead and soft unrestrained mittens placed to ensure Pt safety and prevent removing NGT and IV. Nutrition consult ordered to assist with tube feedings once confirmed in place. Will continue to monitor, f/u CXR.

## 2019-09-20 NOTE — PROVIDER CONTACT NOTE (OTHER) - ACTION/TREATMENT ORDERED:
Will continue to monitor
Will continue to monitor
All safety precautions maintained while patient on CO

## 2019-09-21 LAB
GLUCOSE BLDC GLUCOMTR-MCNC: 105 MG/DL — HIGH (ref 70–99)
GLUCOSE BLDC GLUCOMTR-MCNC: 162 MG/DL — HIGH (ref 70–99)
GLUCOSE BLDC GLUCOMTR-MCNC: 178 MG/DL — HIGH (ref 70–99)
GLUCOSE BLDC GLUCOMTR-MCNC: 191 MG/DL — HIGH (ref 70–99)

## 2019-09-21 RX ADMIN — Medication 2: at 18:27

## 2019-09-21 RX ADMIN — MEROPENEM 100 MILLIGRAM(S): 1 INJECTION INTRAVENOUS at 07:47

## 2019-09-21 RX ADMIN — Medication 2: at 23:52

## 2019-09-21 RX ADMIN — Medication 2: at 07:41

## 2019-09-21 NOTE — PHYSICAL THERAPY INITIAL EVALUATION ADULT - PLANNED THERAPY INTERVENTIONS, PT EVAL
Patient left supine in bed in NAD, call bell in reach, all lines intact. +bilateral hand mitts; nursing staff present

## 2019-09-21 NOTE — CHART NOTE - NSCHARTNOTEFT_GEN_A_CORE
Nutrition note  Nutrition consult ordered for tube feeding orders, Initial assessment completed under document section by RDN on 9/19/2019. Pt with advanced dementia, information obtained from charts, PCA and RN. Pt receiving Glucerna 1.2 , total volume 960 ml, 40 ml/hr in 24 hours. pt tolerating feeds with no GI issues at this time, confirmed with RN.  Labs reviewed, Current feeds not meeting nutritional needs recommend to increase feeds as tolerated to Glucerna 1.2, 50 ml/hr x 24 hours, total volume 1200 ml. Formula will provide 1440 kcal and 72 gram protein. Case discussed with RN. RD remains available.    Current Weight: Weight (kg): 61.3 (09-16 @ 21:34)  % Weight Change    Pertinent Medications: MEDICATIONS  (STANDING):  dextrose 5%. 1000 milliLiter(s) (75 mL/Hr) IV Continuous <Continuous>  dextrose 5%. 1000 milliLiter(s) (50 mL/Hr) IV Continuous <Continuous>  dextrose 50% Injectable 12.5 Gram(s) IV Push once  dextrose 50% Injectable 25 Gram(s) IV Push once  dextrose 50% Injectable 25 Gram(s) IV Push once  insulin lispro (HumaLOG) corrective regimen sliding scale   SubCutaneous every 6 hours    MEDICATIONS  (PRN):  dextrose 40% Gel 15 Gram(s) Oral once PRN Blood Glucose LESS THAN 70 milliGRAM(s)/deciliter  glucagon  Injectable 1 milliGRAM(s) IntraMuscular once PRN Glucose LESS THAN 70 milligrams/deciliter    Pertinent Labs:  09-20 Na154 mmol/L<H> Glu 155 mg/dL<H> K+ 3.8 mmol/L Cr  1.33 mg/dL<H> BUN 74 mg/dL<H> 09-20 Phos 2.7 mg/dL 09-15 Alb 2.8 g/dL<L> 09-17 [id number] 8.1 %<H> Nutrition note  Nutrition consult ordered for tube feeding orders, pt failed  speech and swallow evaluation on 9/20/2019. Initial assessment completed under document section by RDN on 9/19/2019. Pt with advanced dementia, information obtained from charts, PCA and RN. Pt receiving Glucerna 1.2 , total volume 960 ml, 40 ml/hr in 24 hours. pt tolerating feeds with no GI issues at this time, confirmed with RN.  Labs reviewed, elevated HGBa1c noted, pt on insulin and receiving insulin coverage. Current weight 61 kg with 2+ edema in right foot and left foot. Monitor weights, labs and feeding tolerance . Current feeds not meeting nutritional needs recommend to increase feeds as tolerated to Glucerna 1.2, 50 ml/hr x 24 hours, total volume 1200 ml. Formula will provide 1440 kcal and 72 gram protein. Case discussed with RN. RD remains available.    Current Weight: Weight (kg): 61.3 (09-16 @ 21:34)  % Weight Change    Pertinent Medications: MEDICATIONS  (STANDING):  dextrose 5%. 1000 milliLiter(s) (75 mL/Hr) IV Continuous <Continuous>  dextrose 5%. 1000 milliLiter(s) (50 mL/Hr) IV Continuous <Continuous>  dextrose 50% Injectable 12.5 Gram(s) IV Push once  dextrose 50% Injectable 25 Gram(s) IV Push once  dextrose 50% Injectable 25 Gram(s) IV Push once  insulin lispro (HumaLOG) corrective regimen sliding scale   SubCutaneous every 6 hours    MEDICATIONS  (PRN):  dextrose 40% Gel 15 Gram(s) Oral once PRN Blood Glucose LESS THAN 70 milliGRAM(s)/deciliter  glucagon  Injectable 1 milliGRAM(s) IntraMuscular once PRN Glucose LESS THAN 70 milligrams/deciliter    Pertinent Labs:  09-20 Na154 mmol/L<H> Glu 155 mg/dL<H> K+ 3.8 mmol/L Cr  1.33 mg/dL<H> BUN 74 mg/dL<H> 09-20 Phos 2.7 mg/dL 09-15 Alb 2.8 g/dL<L> 09-17 [id number] 8.1 %<H>

## 2019-09-22 LAB
ANION GAP SERPL CALC-SCNC: 11 MMO/L — SIGNIFICANT CHANGE UP (ref 7–14)
ANION GAP SERPL CALC-SCNC: 12 MMO/L — SIGNIFICANT CHANGE UP (ref 7–14)
ANION GAP SERPL CALC-SCNC: 9 MMO/L — SIGNIFICANT CHANGE UP (ref 7–14)
BACTERIA BLD CULT: SIGNIFICANT CHANGE UP
BASOPHILS # BLD AUTO: 0.02 K/UL — SIGNIFICANT CHANGE UP (ref 0–0.2)
BASOPHILS NFR BLD AUTO: 0.1 % — SIGNIFICANT CHANGE UP (ref 0–2)
BLD GP AB SCN SERPL QL: NEGATIVE — SIGNIFICANT CHANGE UP
BUN SERPL-MCNC: 61 MG/DL — HIGH (ref 7–23)
BUN SERPL-MCNC: 62 MG/DL — HIGH (ref 7–23)
BUN SERPL-MCNC: 64 MG/DL — HIGH (ref 7–23)
CALCIUM SERPL-MCNC: 8.4 MG/DL — SIGNIFICANT CHANGE UP (ref 8.4–10.5)
CALCIUM SERPL-MCNC: 8.8 MG/DL — SIGNIFICANT CHANGE UP (ref 8.4–10.5)
CALCIUM SERPL-MCNC: 9.1 MG/DL — SIGNIFICANT CHANGE UP (ref 8.4–10.5)
CHLORIDE SERPL-SCNC: 121 MMOL/L — HIGH (ref 98–107)
CHLORIDE SERPL-SCNC: 122 MMOL/L — HIGH (ref 98–107)
CHLORIDE SERPL-SCNC: 125 MMOL/L — HIGH (ref 98–107)
CO2 SERPL-SCNC: 24 MMOL/L — SIGNIFICANT CHANGE UP (ref 22–31)
CO2 SERPL-SCNC: 28 MMOL/L — SIGNIFICANT CHANGE UP (ref 22–31)
CREAT SERPL-MCNC: 1.27 MG/DL — SIGNIFICANT CHANGE UP (ref 0.5–1.3)
CREAT SERPL-MCNC: 1.34 MG/DL — HIGH (ref 0.5–1.3)
CREAT SERPL-MCNC: 1.36 MG/DL — HIGH (ref 0.5–1.3)
EOSINOPHIL # BLD AUTO: 0.19 K/UL — SIGNIFICANT CHANGE UP (ref 0–0.5)
EOSINOPHIL NFR BLD AUTO: 1.4 % — SIGNIFICANT CHANGE UP (ref 0–6)
GLUCOSE BLDC GLUCOMTR-MCNC: 137 MG/DL — HIGH (ref 70–99)
GLUCOSE BLDC GLUCOMTR-MCNC: 218 MG/DL — HIGH (ref 70–99)
GLUCOSE BLDC GLUCOMTR-MCNC: 254 MG/DL — HIGH (ref 70–99)
GLUCOSE BLDC GLUCOMTR-MCNC: 279 MG/DL — HIGH (ref 70–99)
GLUCOSE SERPL-MCNC: 165 MG/DL — HIGH (ref 70–99)
GLUCOSE SERPL-MCNC: 238 MG/DL — HIGH (ref 70–99)
GLUCOSE SERPL-MCNC: 246 MG/DL — HIGH (ref 70–99)
GRAM STAIN BLOOD: SIGNIFICANT CHANGE UP
HCT VFR BLD CALC: 29 % — LOW (ref 39–50)
HCT VFR BLD CALC: 30.4 % — LOW (ref 39–50)
HGB BLD-MCNC: 8.7 G/DL — LOW (ref 13–17)
HGB BLD-MCNC: 9.4 G/DL — LOW (ref 13–17)
IMM GRANULOCYTES NFR BLD AUTO: 2.2 % — HIGH (ref 0–1.5)
LYMPHOCYTES # BLD AUTO: 1.51 K/UL — SIGNIFICANT CHANGE UP (ref 1–3.3)
LYMPHOCYTES # BLD AUTO: 10.8 % — LOW (ref 13–44)
MAGNESIUM SERPL-MCNC: 2.5 MG/DL — SIGNIFICANT CHANGE UP (ref 1.6–2.6)
MAGNESIUM SERPL-MCNC: 2.6 MG/DL — SIGNIFICANT CHANGE UP (ref 1.6–2.6)
MAGNESIUM SERPL-MCNC: 2.7 MG/DL — HIGH (ref 1.6–2.6)
MCHC RBC-ENTMCNC: 30 % — LOW (ref 32–36)
MCHC RBC-ENTMCNC: 30.3 PG — SIGNIFICANT CHANGE UP (ref 27–34)
MCHC RBC-ENTMCNC: 30.9 % — LOW (ref 32–36)
MCHC RBC-ENTMCNC: 31.1 PG — SIGNIFICANT CHANGE UP (ref 27–34)
MCV RBC AUTO: 100.7 FL — HIGH (ref 80–100)
MCV RBC AUTO: 101 FL — HIGH (ref 80–100)
MONOCYTES # BLD AUTO: 0.74 K/UL — SIGNIFICANT CHANGE UP (ref 0–0.9)
MONOCYTES NFR BLD AUTO: 5.3 % — SIGNIFICANT CHANGE UP (ref 2–14)
NEUTROPHILS # BLD AUTO: 11.16 K/UL — HIGH (ref 1.8–7.4)
NEUTROPHILS NFR BLD AUTO: 80.2 % — HIGH (ref 43–77)
NRBC # FLD: 0 K/UL — SIGNIFICANT CHANGE UP (ref 0–0)
PHOSPHATE SERPL-MCNC: 2 MG/DL — LOW (ref 2.5–4.5)
PHOSPHATE SERPL-MCNC: 2.6 MG/DL — SIGNIFICANT CHANGE UP (ref 2.5–4.5)
PHOSPHATE SERPL-MCNC: 2.7 MG/DL — SIGNIFICANT CHANGE UP (ref 2.5–4.5)
PLATELET # BLD AUTO: 194 K/UL — SIGNIFICANT CHANGE UP (ref 150–400)
PLATELET # BLD AUTO: 217 K/UL — SIGNIFICANT CHANGE UP (ref 150–400)
PMV BLD: 10.7 FL — SIGNIFICANT CHANGE UP (ref 7–13)
PMV BLD: 11.2 FL — SIGNIFICANT CHANGE UP (ref 7–13)
POTASSIUM SERPL-MCNC: 3.8 MMOL/L — SIGNIFICANT CHANGE UP (ref 3.5–5.3)
POTASSIUM SERPL-MCNC: 4.3 MMOL/L — SIGNIFICANT CHANGE UP (ref 3.5–5.3)
POTASSIUM SERPL-MCNC: 4.9 MMOL/L — SIGNIFICANT CHANGE UP (ref 3.5–5.3)
POTASSIUM SERPL-SCNC: 3.8 MMOL/L — SIGNIFICANT CHANGE UP (ref 3.5–5.3)
POTASSIUM SERPL-SCNC: 4.3 MMOL/L — SIGNIFICANT CHANGE UP (ref 3.5–5.3)
POTASSIUM SERPL-SCNC: 4.9 MMOL/L — SIGNIFICANT CHANGE UP (ref 3.5–5.3)
RBC # BLD: 2.87 M/UL — LOW (ref 4.2–5.8)
RBC # BLD: 3.02 M/UL — LOW (ref 4.2–5.8)
RBC # FLD: 15.2 % — HIGH (ref 10.3–14.5)
RBC # FLD: 15.5 % — HIGH (ref 10.3–14.5)
RH IG SCN BLD-IMP: POSITIVE — SIGNIFICANT CHANGE UP
SODIUM SERPL-SCNC: 156 MMOL/L — HIGH (ref 135–145)
SODIUM SERPL-SCNC: 162 MMOL/L — CRITICAL HIGH (ref 135–145)
WBC # BLD: 13.54 K/UL — HIGH (ref 3.8–10.5)
WBC # BLD: 13.92 K/UL — HIGH (ref 3.8–10.5)
WBC # FLD AUTO: 13.54 K/UL — HIGH (ref 3.8–10.5)
WBC # FLD AUTO: 13.92 K/UL — HIGH (ref 3.8–10.5)

## 2019-09-22 RX ORDER — MEROPENEM 1 G/30ML
1000 INJECTION INTRAVENOUS ONCE
Refills: 0 | Status: COMPLETED | OUTPATIENT
Start: 2019-09-22 | End: 2019-09-22

## 2019-09-22 RX ORDER — SODIUM CHLORIDE 9 MG/ML
1000 INJECTION, SOLUTION INTRAVENOUS
Refills: 0 | Status: DISCONTINUED | OUTPATIENT
Start: 2019-09-22 | End: 2019-09-22

## 2019-09-22 RX ADMIN — MEROPENEM 100 MILLIGRAM(S): 1 INJECTION INTRAVENOUS at 22:39

## 2019-09-22 RX ADMIN — Medication 6: at 07:30

## 2019-09-22 RX ADMIN — Medication 4: at 23:25

## 2019-09-22 RX ADMIN — Medication 6: at 17:53

## 2019-09-22 RX ADMIN — SODIUM CHLORIDE 75 MILLILITER(S): 9 INJECTION, SOLUTION INTRAVENOUS at 06:46

## 2019-09-22 NOTE — CHART NOTE - NSCHARTNOTEFT_GEN_A_CORE
[facility] NIGHT COVERAGE.    Discussed with Dr. Scott (Nephrologist, cell#: [phone]) in regards to repeat BMP results. Attg recommended to d/c Dextrose 5 and continue with free water flushes.   Will repeat BMP at 5AM to trend Sodium level. Will continue to monitor patient overnight.     Medicine PA   [id number] [facility] NIGHT COVERAGE.    Discussed with Dr. Scott (Nephrologist, cell#: [phone]) in regards to repeat BMP results. Attg recommended to d/c Dextrose 5 and continue with free water flushes. RN notified.   Will repeat BMP at 5AM to trend Sodium level. Will continue to monitor patient overnight.     Medicine PA   [id number]    9/22/19 AM Na Level: 162     8PM    156<H>  |  121<H>  |  62<H>  ----------------------------<  246<H>  3.8   |  24  |  1.27    Ca    8.4      22 Sep 2019 20:11  Phos  2.0     09-22  Mg     2.5     09-22

## 2019-09-22 NOTE — PHYSICAL THERAPY INITIAL EVALUATION ADULT - GENERAL OBSERVATIONS, REHAB EVAL
Patient found supine in bed in NAD; +bilateral mitts; nursing staff at bedside.
patient received in supine in NAD. nursing staff present at bedside

## 2019-09-22 NOTE — PROVIDER CONTACT NOTE (CRITICAL VALUE NOTIFICATION) - ASSESSMENT
NAD noted
NPO on tube feedings.
Pt. remains very lethargic and responsive to painful stimuli only, afebrile. Not in distress.

## 2019-09-22 NOTE — PHYSICAL THERAPY INITIAL EVALUATION ADULT - PERTINENT HX OF CURRENT PROBLEM, REHAB EVAL
89 yo M w/ PMHx dementia, HTN, DM2, prostate CA s/p radiation seeding in remission, BIBEMS for acute unresponsiveness at assisted living facility x 1 day. Per EMS, pt can respond to simple questions at baseline. Pt sustained a fall three weeks ago.  CT head showing acute on chronic subdural hemorrhages without significant mass effect or midline shift. CT A/P demonstrates likely cystitis. CXR shows new L pleural effusion.
patient presents with AMS/ unresponsiveness. PMH includes dementia, HTN, DM2, prostate CA s/p radiation seeding in remission

## 2019-09-22 NOTE — PROVIDER CONTACT NOTE (CRITICAL VALUE NOTIFICATION) - ACTION/TREATMENT ORDERED:
As per CHICA Murray, Will continue IV Meropenem and that she will order blood cultures repeat in am.
No orders at present, NP  will contact attending for reccomendations
ADS [id number] Alma Rosa aware. No new orders at the moment
will follow

## 2019-09-22 NOTE — PROVIDER CONTACT NOTE (CRITICAL VALUE NOTIFICATION) - SITUATION
Na+ 162
No signs of acute distress noted
gram + rods from anerobic 9/17
Иван Robbins from microbiology called and notified RN that pt's Blood culture from 9/15 2nd set positive for gram negative

## 2019-09-22 NOTE — PROVIDER CONTACT NOTE (CRITICAL VALUE NOTIFICATION) - TEST AND RESULT REPORTED:
Na +162
Sodium 162
Blood culture from 9/15 2nd set positive for gram negative rods
anaerobic from 9/17 after 9 hours gram

## 2019-09-22 NOTE — PROVIDER CONTACT NOTE (CRITICAL VALUE NOTIFICATION) - BACKGROUND
Pt has dementia
Pneumonia
hx sepsis
Pt. was admitted for altered mental status, s/p fall, UTI and Pneumonia

## 2019-09-22 NOTE — CHART NOTE - NSCHARTNOTEFT_GEN_A_CORE
Nephrology (Dr. Ching's group) consulted for recs re: Hypernatremia management. Will initiate free water flushes 200 mL q6h, continue D5W at 75/hr and recheck BMP at 2000.

## 2019-09-22 NOTE — PHYSICAL THERAPY INITIAL EVALUATION ADULT - ADDITIONAL COMMENTS
patient unable to respond to questions, unable to obtain living environment and previous level of function. As per chart review, patient lives in nursing home

## 2019-09-22 NOTE — PHYSICAL THERAPY INITIAL EVALUATION ADULT - DISCHARGE DISPOSITION, PT EVAL
patient not appropriate for skilled PT services at this time as patient is unable to follow commands. will re assess when appropriate/no skilled PT needs

## 2019-09-23 DIAGNOSIS — Z51.5 ENCOUNTER FOR PALLIATIVE CARE: ICD-10-CM

## 2019-09-23 DIAGNOSIS — R53.2 FUNCTIONAL QUADRIPLEGIA: ICD-10-CM

## 2019-09-23 DIAGNOSIS — R13.10 DYSPHAGIA, UNSPECIFIED: ICD-10-CM

## 2019-09-23 DIAGNOSIS — F03.90 UNSPECIFIED DEMENTIA WITHOUT BEHAVIORAL DISTURBANCE: ICD-10-CM

## 2019-09-23 LAB
ANION GAP SERPL CALC-SCNC: 10 MMO/L — SIGNIFICANT CHANGE UP (ref 7–14)
BASOPHILS # BLD AUTO: 0.02 K/UL — SIGNIFICANT CHANGE UP (ref 0–0.2)
BASOPHILS NFR BLD AUTO: 0.1 % — SIGNIFICANT CHANGE UP (ref 0–2)
BUN SERPL-MCNC: 55 MG/DL — HIGH (ref 7–23)
BUN SERPL-MCNC: 56 MG/DL — HIGH (ref 7–23)
CALCIUM SERPL-MCNC: 8.5 MG/DL — SIGNIFICANT CHANGE UP (ref 8.4–10.5)
CALCIUM SERPL-MCNC: 8.8 MG/DL — SIGNIFICANT CHANGE UP (ref 8.4–10.5)
CHLORIDE SERPL-SCNC: 116 MMOL/L — HIGH (ref 98–107)
CHLORIDE SERPL-SCNC: 120 MMOL/L — HIGH (ref 98–107)
CHLORIDE UR-SCNC: < 20 MMOL/L — SIGNIFICANT CHANGE UP
CO2 SERPL-SCNC: 27 MMOL/L — SIGNIFICANT CHANGE UP (ref 22–31)
CO2 SERPL-SCNC: 28 MMOL/L — SIGNIFICANT CHANGE UP (ref 22–31)
CREAT ?TM UR-MCNC: 64 MG/DL — SIGNIFICANT CHANGE UP
CREAT SERPL-MCNC: 1.24 MG/DL — SIGNIFICANT CHANGE UP (ref 0.5–1.3)
CREAT SERPL-MCNC: 1.3 MG/DL — SIGNIFICANT CHANGE UP (ref 0.5–1.3)
EOSINOPHIL # BLD AUTO: 0.21 K/UL — SIGNIFICANT CHANGE UP (ref 0–0.5)
EOSINOPHIL NFR BLD AUTO: 1.5 % — SIGNIFICANT CHANGE UP (ref 0–6)
GLUCOSE BLDC GLUCOMTR-MCNC: 165 MG/DL — HIGH (ref 70–99)
GLUCOSE BLDC GLUCOMTR-MCNC: 271 MG/DL — HIGH (ref 70–99)
GLUCOSE BLDC GLUCOMTR-MCNC: 287 MG/DL — HIGH (ref 70–99)
GLUCOSE SERPL-MCNC: 300 MG/DL — HIGH (ref 70–99)
GLUCOSE SERPL-MCNC: 85 MG/DL — SIGNIFICANT CHANGE UP (ref 70–99)
HCT VFR BLD CALC: 34 % — LOW (ref 39–50)
HGB BLD-MCNC: 10.2 G/DL — LOW (ref 13–17)
IMM GRANULOCYTES NFR BLD AUTO: 1.5 % — SIGNIFICANT CHANGE UP (ref 0–1.5)
LYMPHOCYTES # BLD AUTO: 1.32 K/UL — SIGNIFICANT CHANGE UP (ref 1–3.3)
LYMPHOCYTES # BLD AUTO: 9.6 % — LOW (ref 13–44)
MAGNESIUM SERPL-MCNC: 2.4 MG/DL — SIGNIFICANT CHANGE UP (ref 1.6–2.6)
MAGNESIUM SERPL-MCNC: 2.6 MG/DL — SIGNIFICANT CHANGE UP (ref 1.6–2.6)
MCHC RBC-ENTMCNC: 30 % — LOW (ref 32–36)
MCHC RBC-ENTMCNC: 30.5 PG — SIGNIFICANT CHANGE UP (ref 27–34)
MCV RBC AUTO: 101.8 FL — HIGH (ref 80–100)
MONOCYTES # BLD AUTO: 0.86 K/UL — SIGNIFICANT CHANGE UP (ref 0–0.9)
MONOCYTES NFR BLD AUTO: 6.2 % — SIGNIFICANT CHANGE UP (ref 2–14)
NEUTROPHILS # BLD AUTO: 11.15 K/UL — HIGH (ref 1.8–7.4)
NEUTROPHILS NFR BLD AUTO: 81.1 % — HIGH (ref 43–77)
NRBC # FLD: 0 K/UL — SIGNIFICANT CHANGE UP (ref 0–0)
PHOSPHATE SERPL-MCNC: 2.1 MG/DL — LOW (ref 2.5–4.5)
PHOSPHATE SERPL-MCNC: 2.2 MG/DL — LOW (ref 2.5–4.5)
PLATELET # BLD AUTO: 247 K/UL — SIGNIFICANT CHANGE UP (ref 150–400)
PMV BLD: 11.2 FL — SIGNIFICANT CHANGE UP (ref 7–13)
POTASSIUM SERPL-MCNC: 4.5 MMOL/L — SIGNIFICANT CHANGE UP (ref 3.5–5.3)
POTASSIUM SERPL-MCNC: 4.7 MMOL/L — SIGNIFICANT CHANGE UP (ref 3.5–5.3)
POTASSIUM SERPL-SCNC: 4.5 MMOL/L — SIGNIFICANT CHANGE UP (ref 3.5–5.3)
POTASSIUM SERPL-SCNC: 4.7 MMOL/L — SIGNIFICANT CHANGE UP (ref 3.5–5.3)
POTASSIUM UR-SCNC: 44.1 MMOL/L — SIGNIFICANT CHANGE UP
RBC # BLD: 3.34 M/UL — LOW (ref 4.2–5.8)
RBC # FLD: 15.3 % — HIGH (ref 10.3–14.5)
SODIUM SERPL-SCNC: 153 MMOL/L — HIGH (ref 135–145)
SODIUM SERPL-SCNC: 158 MMOL/L — HIGH (ref 135–145)
SODIUM UR-SCNC: < 20 MMOL/L — SIGNIFICANT CHANGE UP
SPECIMEN SOURCE: SIGNIFICANT CHANGE UP
WBC # BLD: 13.77 K/UL — HIGH (ref 3.8–10.5)
WBC # FLD AUTO: 13.77 K/UL — HIGH (ref 3.8–10.5)

## 2019-09-23 PROCEDURE — 99223 1ST HOSP IP/OBS HIGH 75: CPT

## 2019-09-23 PROCEDURE — 70450 CT HEAD/BRAIN W/O DYE: CPT | Mod: 26

## 2019-09-23 RX ORDER — SODIUM CHLORIDE 9 MG/ML
1000 INJECTION, SOLUTION INTRAVENOUS
Refills: 0 | Status: DISCONTINUED | OUTPATIENT
Start: 2019-09-23 | End: 2019-09-23

## 2019-09-23 RX ADMIN — Medication 6: at 18:32

## 2019-09-23 RX ADMIN — Medication 6: at 12:16

## 2019-09-23 RX ADMIN — SODIUM CHLORIDE 50 MILLILITER(S): 9 INJECTION, SOLUTION INTRAVENOUS at 06:20

## 2019-09-23 RX ADMIN — Medication 2: at 06:20

## 2019-09-23 NOTE — SWALLOW BEDSIDE ASSESSMENT ADULT - SPECIFY REASON(S)
to reassess swallow function due to "patient is more awake/alert"
to reassess swallow function
To assess swallow mechanism

## 2019-09-23 NOTE — CONSULT NOTE ADULT - ASSESSMENT
89 yo M w/ PMHx dementia, HTN, DM2, prostate CA s/p radiation seeding in remission, BIBEMS for acute unresponsiveness at assisted living facility x 1 day.  patient found to have gram negative sepsis and acute on chronic subdural hemorrhages - also with hypernatremia. Palliative consulted for goals of care.

## 2019-09-23 NOTE — SWALLOW BEDSIDE ASSESSMENT ADULT - ORAL PHASE
Prolonged anterior-posterior transfer
Decreased anterior-posterior movement of the bolus/Delayed oral transit time/Lingual stasis
Prolonged anterior-posterior transfer

## 2019-09-23 NOTE — CONSULT NOTE ADULT - ASSESSMENT
89 yo M w/ PMHx dementia, HTN, DM2, prostate CA s/p radiation seeding in remission, BIBEMS for acute unresponsiveness at assisted living facility x 1 day. nephrology consulted for hypernatremia    HYpernatremia  likely dehydration  check urine na, osmo to r/o DI  on 200cc q6 free water via peg  Na improving  off IVF  monitor bmp  avoid overcorrection  increase free water 200cc q 4hrs if na not improving tmr.   optimize DM control Dc D5w 89 yo M w/ PMHx dementia, HTN, DM2, prostate CA s/p radiation seeding in remission, BIBEMS for acute unresponsiveness at assisted living facility x 1 day. nephrology consulted for hypernatremia    HYpernatremia  likely dehydration  check urine na, osmo to r/o DI  continue on 200cc q6 free water via peg  Na improving  off IVF  monitor bmp  Avoid overcorrection. Currently at goal for today.   Will reassess tomorrow   optimize DM control Dc D5w 89 yo M w/ PMHx dementia, HTN, DM2, prostate CA s/p radiation seeding in remission, BIBEMS for acute unresponsiveness at assisted living facility x 1 day. nephrology consulted for hypernatremia    HYpernatremia  likely dehydration  check urine na, osmo to r/o DI  continue on 200cc q6 free water via NGT  Na improving  off IVF  monitor bmp  Avoid overcorrection. Currently at goal for today.   Will reassess tomorrow   optimize DM control Dc D5w

## 2019-09-23 NOTE — CONSULT NOTE ADULT - PROBLEM SELECTOR RECOMMENDATION 3
As per Yasmine, patient was living at [facility] with 24hr HHAs - able to feed himself if set up properly and he was impulsive with multiple falls.  He was verbal and ambulatory with assistance prior to admission but she states he has had dementia for ~3 years. Currently, patient is bedbound, non-verbal, incontinent, and has dysphagia.  FAST ~7C.  Patient is hospice appropriate. As per Yasmine, patient was living at [facility] with 24hr HHAs - able to feed himself if set up properly and he was impulsive with multiple falls.  He was verbal and ambulatory with assistance prior to admission but she states he has had dementia for ~3 years. Currently, patient is bedbound, non-verbal, incontinent, and has dysphagia.  FAST ~7D.  Patient is hospice appropriate.

## 2019-09-23 NOTE — SWALLOW BEDSIDE ASSESSMENT ADULT - SWALLOW EVAL: DIAGNOSIS
Patient demonstrated oropharyngeal dysphagia for limited trials of puree and honey-thick liquid. Oral stage was marked by adequate oral acceptance, weak labial stripping of bolus from teaspoon, decreased bolus collection and slow/weak lingual motion with prolonged anterior-posterior transfer. Pharyngeal stage was marked by delayed initiation of the swallow onset, reduced laryngeal elevation suspected upon digital palpation and immediate wet upper airway sounds noted post swallow suggestive of laryngeal penetration vs aspiration. Patient did not respond to clinician cues to voice nor produce strong cough post swallow. Patient demonstrated oropharyngeal dysphagia for limited trials of puree and honey-thick liquid. Oral stage was marked by delayed orientation to utensil presentation, weak labial stripping of bolus from teaspoon, decreased bolus collection and slow/weak lingual motion with prolonged anterior-posterior transfer. Pharyngeal stage was marked by delayed initiation of the swallow onset, reduced laryngeal elevation suspected upon digital palpation and immediate wet upper airway sounds noted post swallow suggestive of laryngeal penetration vs aspiration. Patient did not respond to clinician cues to voice nor produce strong cough post swallow. Patient demonstrated oropharyngeal dysphagia for limited trials of puree and honey-thick liquid. Oral stage was marked by delayed orientation to utensil presentation, weak labial stripping of bolus from teaspoon, decreased bolus collection and slow/weak lingual motion with prolonged anterior-posterior transfer. Pharyngeal stage was marked by delayed initiation of the swallow onset, reduced laryngeal elevation suspected upon digital palpation and immediate wet upper airway sounds noted post swallow suggestive of laryngeal penetration vs aspiration. Patient did not respond to clinician cues to produce voicing or strong cough post swallow.

## 2019-09-23 NOTE — CONSULT NOTE ADULT - PROBLEM SELECTOR RECOMMENDATION 9
Patient with failed speech and swallow x 2.  Currently with NGT since Friday with minimal change in condition as per patient's daughter.  Yasmine states that her father would not want a PEG and the hope was that he would receive a few days of nutrition and his condition would improve.  Pleasure feeds discussed at length given the goals are not to pursue a PEG, which is appropriate given his advanced dementia.  Yasmine aware of the risk of aspiration - including pneumonia and death - aspirations discussed at length as well as a modified diet.  Yasmine verbalized understanding and would like to discuss removal of the feeding tube with her family but feels strongly that if the NGT is not going to improve his condition, she would not want to continue something that would cause him discomfort with minimal to no benefit.  Yasmine will call the primary team with her decision later today.  Will follow-up tomorrow.

## 2019-09-23 NOTE — SWALLOW BEDSIDE ASSESSMENT ADULT - SWALLOW EVAL: RECOMMENDED FEEDING/EATING TECHNIQUES
allow for swallow between intakes/alternate food with liquid/crush medication (when feasible)/maintain upright posture during/after eating for 30 mins/no straws/oral hygiene/position upright (90 degrees)/small sips/bites

## 2019-09-23 NOTE — SWALLOW BEDSIDE ASSESSMENT ADULT - COMMENTS
Per charting, 89 yo M w/ PMHx dementia, HTN, DM2, prostate CA s/p radiation seeding in remission, BIBEMS for acute unresponsiveness at assisted living facility x 1 day. Patient s/p fall x 3 weeks ago. Patient with end stage dementia however as per daughter, awake/alert and minimally verbal.    Patient known to this service from initial bedside swallow evaluation on 9/19/19 (please see chart for full report). Patient was received in somnolent state, eventually arousable to noxious stimuli. Patient non-verbal, with poor eye contact, not following low-level directives despite multimodality cueing; appearing with reduced awareness of immediate surroundings.
Patient is a "89 yo M w/ PMHx dementia, HTN, DM2, prostate CA s/p radiation seeding in remission, BIBEMS for acute unresponsiveness at assisted living facility x 1 day. Patient s/p fall x 3 weeks ago. Patient with end stage dementia however as per daughter, awake/alert and minimally verbal". CTH completed on 9/16/19 reported "no new areas of acute intracranial hemorrhage; readministration of mixed attenuation bilateral holo-hemisphere and focal L sided parafalcine subdural hematomas which appear more dense in attenuation when compared to most recent prior CT. CT of abdomen completed on 9/16/19 reported "trace bilateral pleural effusion". Chest CT completed on 9/15/19 reported "multifocal opacities could represent PNA". As per chart review, patient "NPO 2/2 holding oral meds".    Patient seen upright at bedside. Patient was alert/awake, though with minimal vocalizations/verbalizations noted. Patient unable to follow simple directions despite max prompting/ encouragement, though orally receptive to PO trials.
Per charting, 89 yo M w/ PMHx dementia, HTN, DM2, prostate CA s/p radiation seeding in remission, BIBEMS for acute unresponsiveness at assisted living facility x 1 day. Patient s/p fall x 3 weeks ago. Patient with end stage dementia however as per daughter, awake/alert and minimally verbal.    CT Head 9/23/19 - Previously noted bilateral subdural hematomas have demonstrated expected evolutionary changes.     Patient known to this service from prior bedside swallow evaluation x2 (9/19 and 9/20), at which times a PO diet could not be recommended given the severity of pt's oral/pharyngeal stage deficits; see full reports for details. Patient was received awake, non-verbal and with poor response to low-level directives despite max cueing this PM. NGT in place.

## 2019-09-23 NOTE — CONSULT NOTE ADULT - SUBJECTIVE AND OBJECTIVE BOX
[facility] [place] [facility]PHROLOGY PRACTICE   MD FIORELLA JACKSON, DO MELODY BAXTER, CHICA ROBIN    TEL:  OFFICE: [phone]  DR REDMOND CELL: [phone]  DR. PETERS CELL: [phone]  DR. BAXTER CELL: [phone]  GUSTAVO ROSALES CELL: [phone]    HPI:  *****Patient unable to provide a subjective history due to unresponsiveness, history obtained from medical records, ER*****    Pt is a 89 yo M w/ PMHx dementia, HTN, DM2, prostate CA s/p radiation seeding in remission, BIBEMS for acute unresponsiveness at assisted living facility x 1 day. Pt sustained a fall three weeks ago.    In ED: VS Temp 97, HR 80, /32 RR 16 96% SpO2. WBC 11.89. Hgb 10, Plt 68, K 5.5, BUN 44/Cr 1.55, Albumin 2.8, Alk phos 175, AST 41, ALT 69. lactate 1.5. UA positive. tox screen negative. RVP negative. Bcx, Ucx pending. CT head showing acute on chronic subdural hemorrhages without significant mass effect or midline shift. CT A/P demonstrates likely cystitis. CXR shows new L pleural effusion, cannot exclude underlying infection; patchy R midlung opacity may also be infectious. S/p vanc and zosyn, 1L NS, albuterol neb x1 (16 Sep 2019 05:47)      Allergies:  aspirin (Unknown)  Haldol (Other)  Haldol (Unknown)      PAST MEDICAL & SURGICAL HISTORY:  Orthostatic hypotension  Dementia  T2DM (type 2 diabetes mellitus)  GIB (gastrointestinal bleeding): due to ASA 20 years ago, no egd/ colonoscopy  [place] (hard of hearing): Refused Hearing Aides  History of goiter: Rt sided dx&#x27;d 5 years ago  Prostate cancer: s/p Radiation seeding, in remission for past 25 years  Essential hypertension  DM (diabetes mellitus): on glipizide  Dementia: Dx in 2014  No significant past surgical history  No significant past surgical history      Home Medications Reviewed    Hospital Medications:   MEDICATIONS  (STANDING):  dextrose 5%. 1000 milliLiter(s) (50 mL/Hr) IV Continuous <Continuous>  dextrose 50% Injectable 12.5 Gram(s) IV Push once  dextrose 50% Injectable 25 Gram(s) IV Push once  dextrose 50% Injectable 25 Gram(s) IV Push once  insulin lispro (HumaLOG) corrective regimen sliding scale   SubCutaneous every 6 hours      SOCIAL HISTORY:  Denies ETOh, Smoking,     FAMILY HISTORY:  FH: macular degeneration: Mother and father  FH: type 2 diabetes: Mother, Father, Brother, and sister      REVIEW OF SYSTEMS:  unable to assess     VITALS:  T(F): 98.7 (09-23-19 @ 14:28), Max: 98.7 (09-23-19 @ 14:28)  HR: 97 (09-23-19 @ 14:28)  BP: 99/68 (09-23-19 @ 14:28)  RR: 16 (09-23-19 @ 14:28)  SpO2: 99% (09-23-19 @ 14:28)  Wt(kg): --    09-23 @ 07:01  -  09-23 @ 15:50  --------------------------------------------------------  IN: 200 mL / OUT: 0 mL / NET: 200 mL          PHYSICAL EXAM:  Constitutional: nonverbal, awake, NAD  HEENT: anicteric sclera, oropharynx clear, MMM  Neck: No JVD  Respiratory: CTAB, no wheezes, rales or rhonchi  Cardiovascular: S1, S2, RRR  Gastrointestinal: BS+, soft, +peg  Extremities: No cyanosis or clubbing. No peripheral edema  Neurological: A/O x 0  : No CVA tenderness. No candelaria.   Skin: No rashes      LABS:  09-23    153<H>  |  116<H>  |  55<H>  ----------------------------<  300<H>  4.7   |  27  |  1.24    Ca    8.5      23 Sep 2019 10:20  Phos  2.2     09-23  Mg     2.4     09-23      Creatinine Trend: 1.24 <--, 1.30 <--, 1.27 <--, 1.36 <--, 1.34 <--, 1.33 <--, 1.50 <--, 1.55 <--, 1.62 <--, 1.70 <--, 1.63 <--                        10.2   13.77 )-----------( 247      ( 23 Sep 2019 02:38 )             34.0     Urine Studies:        RADIOLOGY & ADDITIONAL STUDIES: [facility] [place] [facility]ACT[facility]   MD FIORELLA JACKSON, DO MELODY BAXTER, CHICA ROBIN    TEL:  OFFICE: [phone]  DR REDMOND CELL: [phone]  DR. PETERS CELL: [phone]  DR. BAXTER CELL: [phone]  GUSTAVO ROSALES CELL: [phone]    HPI:  *****Patient unable to provide a subjective history due to unresponsiveness, history obtained from medical records, ER*****    Pt is a 89 yo M w/ PMHx dementia, HTN, DM2, prostate CA s/p radiation seeding in remission, BIBEMS for acute unresponsiveness at assisted living facility x 1 day. Pt sustained a fall three weeks ago.    In ED: VS Temp 97, HR 80, /32 RR 16 96% SpO2. WBC 11.89. Hgb 10, Plt 68, K 5.5, BUN 44/Cr 1.55, Albumin 2.8, Alk phos 175, AST 41, ALT 69. lactate 1.5. UA positive. tox screen negative. RVP negative. Bcx, Ucx pending. CT head showing acute on chronic subdural hemorrhages without significant mass effect or midline shift. CT A/P demonstrates likely cystitis. CXR shows new L pleural effusion, cannot exclude underlying infection; patchy R midlung opacity may also be infectious. S/p vanc and zosyn, 1L NS, albuterol neb x1 (16 Sep 2019 05:47)      Allergies:  aspirin (Unknown)  Haldol (Other)  Haldol (Unknown)      PAST MEDICAL & SURGICAL HISTORY:  Orthostatic hypotension  Dementia  T2DM (type 2 diabetes mellitus)  GIB (gastrointestinal bleeding): due to ASA 20 years ago, no egd/ colonoscopy  [place] (hard of hearing): Refused Hearing Aides  History of goiter: Rt sided dx&#x27;d 5 years ago  Prostate cancer: s/p Radiation seeding, in remission for past 25 years  Essential hypertension  DM (diabetes mellitus): on glipizide  Dementia: Dx in 2014  No significant past surgical history  No significant past surgical history      Home Medications Reviewed    Hospital Medications:   MEDICATIONS  (STANDING):  dextrose 5%. 1000 milliLiter(s) (50 mL/Hr) IV Continuous <Continuous>  dextrose 50% Injectable 12.5 Gram(s) IV Push once  dextrose 50% Injectable 25 Gram(s) IV Push once  dextrose 50% Injectable 25 Gram(s) IV Push once  insulin lispro (HumaLOG) corrective regimen sliding scale   SubCutaneous every 6 hours      SOCIAL HISTORY:  Denies ETOh, Smoking,     FAMILY HISTORY:  FH: macular degeneration: Mother and father  FH: type 2 diabetes: Mother, Father, Brother, and sister      REVIEW OF SYSTEMS:  unable to assess     VITALS:  T(F): 98.7 (09-23-19 @ 14:28), Max: 98.7 (09-23-19 @ 14:28)  HR: 97 (09-23-19 @ 14:28)  BP: 99/68 (09-23-19 @ 14:28)  RR: 16 (09-23-19 @ 14:28)  SpO2: 99% (09-23-19 @ 14:28)  Wt(kg): --    09-23 @ 07:01  -  09-23 @ 15:50  --------------------------------------------------------  IN: 200 mL / OUT: 0 mL / NET: 200 mL          PHYSICAL EXAM:  Constitutional: nonverbal, awake, NAD  HEENT: anicteric sclera, oropharynx clear, MMM  Neck: No JVD  Respiratory: CTAB, no wheezes, rales or rhonchi  Cardiovascular: S1, S2, RRR  Gastrointestinal: BS+, soft, +NGT  Extremities: No cyanosis or clubbing. No peripheral edema  Neurological: A/O x 0  : No CVA tenderness. No candelaira.   Skin: No rashes      LABS:  09-23    153<H>  |  116<H>  |  55<H>  ----------------------------<  300<H>  4.7   |  27  |  1.24    Ca    8.5      23 Sep 2019 10:20  Phos  2.2     09-23  Mg     2.4     09-23      Creatinine Trend: 1.24 <--, 1.30 <--, 1.27 <--, 1.36 <--, 1.34 <--, 1.33 <--, 1.50 <--, 1.55 <--, 1.62 <--, 1.70 <--, 1.63 <--                        10.2   13.77 )-----------( 247      ( 23 Sep 2019 02:38 )             34.0     Urine Studies:        RADIOLOGY & ADDITIONAL STUDIES:

## 2019-09-23 NOTE — CHART NOTE - NSCHARTNOTEFT_GEN_A_CORE
Call placed to pts daughter Yasmine (HCP) & pts wife Mary (also present with daughter during phone conversation)- family discussed role of hospice and are open to pursuing hospice care.They would like to speak to a hospice care representative, hospice care referral made today.     Yasmine and Mary are ok with removing NGT, stopping tube feeds, and beginning pleasure feeds. They would like to continue the feeds overnight and remove the NGT tomorrow morning. Will plan to remove NGT tomorrow. Will continue to monitor,

## 2019-09-23 NOTE — GOALS OF CARE CONVERSATION - PERSONAL ADVANCE DIRECTIVE - CONVERSATION DETAILS
[facility]    Tel call placed to pt's daughter, Yasmine Hyman, to give information about a hospice plan of care. Message left for return call. [facility] RN will follow up - will attempt to reach daughter on Tues 9/24/19.

## 2019-09-23 NOTE — SWALLOW BEDSIDE ASSESSMENT ADULT - PHARYNGEAL PHASE
Inconsistent trigger of swallow; Congested upper airway sounds post swallow
Delayed pharyngeal swallow/Decreased laryngeal elevation/Wet vocal quality post oral intake/Throat clear post oral intake
Wet upper airways sounds immediately post swallow/Delayed pharyngeal swallow/Decreased laryngeal elevation

## 2019-09-23 NOTE — SWALLOW BEDSIDE ASSESSMENT ADULT - SWALLOW EVAL: RECOMMENDED DIET
1) Continue Non-Oral Means of Nutrition/Hydration/Medication; 2) Defer to MD for ongoing GOC discussion with patient's family/caregivers
1) Unable to recommend a PO diet at this time given the severity of patient's oral/pharyngeal stage deficits; 2) Defer to MD to discuss GOC with patient/family re: Oral vs Non-Oral feeds in accordance with patient/family wishes
1. Oral means of Nutrition/ Hydration/ Medication contraindicated at this time given severity of oropharyngeal phase deficits 2. Consider Non-Oral means of nutrition/ hydration/ medication 3. Defer to MD for Nutritional Goals of Care/ Goals of Care discussion with patient/family

## 2019-09-23 NOTE — SWALLOW BEDSIDE ASSESSMENT ADULT - ORAL PREPARATORY PHASE
Weak/reduced labial stripping of bolus from utensil; Delayed bolus collection Delayed orientation to utensil presentation; Weak/reduced labial stripping of bolus from utensil; Delayed bolus collection

## 2019-09-23 NOTE — CONSULT NOTE ADULT - PROBLEM SELECTOR RECOMMENDATION 4
Hospice discussed at length with Yasmine (HCP form available in chart).  She is aware that rehab is not option as patient cannot follow commands at this time.  She is agreeable to hospice referral to determine the best next steps for her father as the goals at this time are to focus and maximize his quality of life. Hospice referral made.  Case management and primary team aware.  Psychosocial support provided.

## 2019-09-23 NOTE — CONSULT NOTE ADULT - SUBJECTIVE AND OBJECTIVE BOX
HPI: Obtained from H&P   *****Patient unable to provide a subjective history due to unresponsiveness, history obtained from medical records, ER*****    Pt is a 89 yo M w/ PMHx dementia, HTN, DM2, prostate CA s/p radiation seeding in remission, BIBEMS for acute unresponsiveness at assisted living facility x 1 day. Per EMS, pt can respond to simple questions at baseline. Pt sustained a fall three weeks ago.    In ED: VS Temp 97, HR 80, /32 RR 16 96% SpO2. WBC 11.89. Hgb 10, Plt 68, K 5.5, BUN 44/Cr 1.55, Albumin 2.8, Alk phos 175, AST 41, ALT 69. lactate 1.5. UA positive. tox screen negative. RVP negative. Bcx, Ucx pending. CT head showing acute on chronic subdural hemorrhages without significant mass effect or midline shift. CT A/P demonstrates likely cystitis. CXR shows new L pleural effusion, cannot exclude underlying infection; patchy R midlung opacity may also be infectious. S/p vanc and zosyn, 1L NS, albuterol neb x1 (16 Sep 2019 05:47)    patient admitted with UTI and gram negative sepsis.       PERTINENT PM/SXH:   Orthostatic hypotension  Dementia  T2DM (type 2 diabetes mellitus)  GIB (gastrointestinal bleeding)  [place] (hard of hearing)  History of goiter  Prostate cancer  Essential hypertension  DM (diabetes mellitus)  HTN (hypertension)  Dementia    No significant past surgical history  No significant past surgical history    FAMILY HISTORY:  FH: macular degeneration: Mother and father  FH: type 2 diabetes: Mother, Father, Brother, and sister      SOCIAL HISTORY:   Significant other/partner: Yes [x ]  No [ ] Children:  Yes [x ]  No [ ] Mosque/Spirituality: Yazdanism   Substance hx:Tobacco hx: Alcohol hx: unable to assess   Home Opioid hx:  Yes [ ] No [x ]  [ ] I-Stop Reference No:  Living Situation: [ ]Home  x[x ]Long term care - [place] assisted living     ADVANCE DIRECTIVES:    DNR  Yes  MOLST  Yes [ x]     [x ] Health Care Proxy(s)  [ ] Surrogate(s)  [ ] Guardian           Name(s): Phone Number(s): Yasmine Hyman [phone]    BASELINE (I)ADL(s) (prior to admission):  [place]: [ ]Total  [ ] Moderate [ x]Dependent    Allergies    Haldol (Other)  Haldol (Unknown)    Intolerances    aspirin (Unknown)  MEDICATIONS  (STANDING):  dextrose 5%. 1000 milliLiter(s) (75 mL/Hr) IV Continuous <Continuous>  dextrose 5%. 1000 milliLiter(s) (50 mL/Hr) IV Continuous <Continuous>  dextrose 50% Injectable 12.5 Gram(s) IV Push once  dextrose 50% Injectable 25 Gram(s) IV Push once  dextrose 50% Injectable 25 Gram(s) IV Push once  insulin lispro (HumaLOG) corrective regimen sliding scale   SubCutaneous every 6 hours    MEDICATIONS  (PRN):  dextrose 40% Gel 15 Gram(s) Oral once PRN Blood Glucose LESS THAN 70 milliGRAM(s)/deciliter  glucagon  Injectable 1 milliGRAM(s) IntraMuscular once PRN Glucose LESS THAN 70 milligrams/deciliter    PRESENT SYMPTOMS: [x ]Unable to obtain due to poor mentation   Source if other than patient:  [ ]Family   [ ]Team     Pain (Impact on QOL):    Location -   Severity -        Minimal acceptable level (0-10 scale):  Quality:   Onset:   Duration:                 Aggravating factors -  Relieving factors -  Radiation -    PAIN AD Score: 0    http://geriatrictoolkit.[facility]/cog/painad.pdf (press ctrl +  left click to view)    Dyspnea:  Yes [ ] No [ ] - [ ]Mild [ ]Moderate [ ]Severe  Anxiety:    Yes [ ] No [ ] - [ ]Mild [ ]Moderate [ ]Severe  Fatigue:    Yes [ ] No [ ] - [ ]Mild [ ]Moderate [ ]Severe  Nausea:    Yes [ ] No [ ] - [ ]Mild [ ]Moderate [ ]Severe                         Loss of appetite: Yes [ ] No [ ] - [ ]Mild [ ]Moderate [ ]Severe             Constipation:  Yes [ ] No [ ] - [ ]Mild [ ]Moderate [ ]Severe  Grief: Yes [ ] No [ ]     Other Symptoms:  [ ]All other review of systems negative     Karnofsky Performance Score/Palliative Performance Status Version 2:   10%    http://palliative.info/resource_material/[email or web address].pdf    PHYSICAL EXAM:  Vital Signs Last 24 Hrs  T(C): 37.1 (23 Sep 2019 14:28), Max: 37.1 (23 Sep 2019 14:28)  T(F): 98.7 (23 Sep 2019 14:28), Max: 98.7 (23 Sep 2019 14:28)  HR: 97 (23 Sep 2019 14:28) (85 - 104)  BP: 99/68 (23 Sep 2019 14:28) (96/66 - 134/79)  BP(mean): --  RR: 16 (23 Sep 2019 14:28) (16 - 20)  SpO2: 99% (23 Sep 2019 14:28) (97% - 100%) I&O's Summary    23 Sep 2019 07:01  -  23 Sep 2019 14:52  --------------------------------------------------------  IN: 200 mL / OUT: 0 mL / NET: 200 mL        GENERAL:  Agitated at times - opens eyes - not following commands on exam   HEENT:  [ ]Normal   [x ]Dry mouth   [ ]ET Tube/Trach  [ ]Oral lesions  PULMONARY:   [ x]Clear  anteriorly   CARDIOVASCULAR:    [x ]Regular [ ]Irregular [ ]Tachy  [ ]Fortunato [ ]Murmur [ ]Other  GASTROINTESTINAL:  [x ]Soft  [ ]Distended   [x ]+BS  [ x]Non tender [ ]Tender  [ ]PEG [x ]OGT/ NGT  Last BM: 9/21/19    GENITOURINARY:  [ ]Normal [ x] Incontinent   [ ]Oliguria/Anuria   [ ][place]  MUSCULOSKELETAL:   [ ]Normal   [x ]Weakness  [ ]Bed/Wheelchair bound [ ]Edema  NEUROLOGIC:   [ ]No focal deficits  [ x] Cognitive impairment  [ ] Dysphagia [ ]Dysarthria [ ] Paresis [ ]Other   SKIN:   [x ]Normal   [ ]Pressure ulcer(s)  [ ]Rash    LABS:                        10.2   13.77 )-----------( 247      ( 23 Sep 2019 02:38 )             34.0   09-23    153<H>  |  116<H>  |  55<H>  ----------------------------<  300<H>  4.7   |  27  |  1.24    Ca    8.5      23 Sep 2019 10:20  Phos  2.2     09-23  Mg     2.4     09-23          RADIOLOGY & ADDITIONAL STUDIES: Reviewed  CT head  Impression: Previously noted bilateral subdural hematomas have   demonstrated expected evolutionary changes.    PROTEIN CALORIE MALNUTRITION PRESENT: [ ] Yes [ ] No  [ ] PPSV2 < or = to 30% [ ] significant weight loss  [ ] poor nutritional intake [ ] catabolic state [ ] anasarca     Albumin, Serum: 2.8 g/dL (09-15-19 @ 17:23)      REFERRALS:   [ ]Chaplaincy  [ ] Hospice  [ ]Child Life  [ ]Social Work  [ ]Case management [ ]Holistic Therapy   Goals of Care Discussion Document:

## 2019-09-24 LAB
ALBUMIN SERPL ELPH-MCNC: 2.2 G/DL — LOW (ref 3.3–5)
ALP SERPL-CCNC: 174 U/L — HIGH (ref 40–120)
ALT FLD-CCNC: 42 U/L — HIGH (ref 4–41)
ANION GAP SERPL CALC-SCNC: 15 MMO/L — HIGH (ref 7–14)
AST SERPL-CCNC: 48 U/L — HIGH (ref 4–40)
BASOPHILS # BLD AUTO: 0.03 K/UL — SIGNIFICANT CHANGE UP (ref 0–0.2)
BASOPHILS NFR BLD AUTO: 0.2 % — SIGNIFICANT CHANGE UP (ref 0–2)
BILIRUB SERPL-MCNC: 0.7 MG/DL — SIGNIFICANT CHANGE UP (ref 0.2–1.2)
BUN SERPL-MCNC: 50 MG/DL — HIGH (ref 7–23)
CALCIUM SERPL-MCNC: 8.6 MG/DL — SIGNIFICANT CHANGE UP (ref 8.4–10.5)
CHLORIDE SERPL-SCNC: 112 MMOL/L — HIGH (ref 98–107)
CO2 SERPL-SCNC: 27 MMOL/L — SIGNIFICANT CHANGE UP (ref 22–31)
CREAT SERPL-MCNC: 1.23 MG/DL — SIGNIFICANT CHANGE UP (ref 0.5–1.3)
EOSINOPHIL # BLD AUTO: 0.06 K/UL — SIGNIFICANT CHANGE UP (ref 0–0.5)
EOSINOPHIL NFR BLD AUTO: 0.3 % — SIGNIFICANT CHANGE UP (ref 0–6)
GLUCOSE BLDC GLUCOMTR-MCNC: 129 MG/DL — HIGH (ref 70–99)
GLUCOSE BLDC GLUCOMTR-MCNC: 201 MG/DL — HIGH (ref 70–99)
GLUCOSE BLDC GLUCOMTR-MCNC: 211 MG/DL — HIGH (ref 70–99)
GLUCOSE BLDC GLUCOMTR-MCNC: 266 MG/DL — HIGH (ref 70–99)
GLUCOSE BLDC GLUCOMTR-MCNC: 268 MG/DL — HIGH (ref 70–99)
GLUCOSE SERPL-MCNC: 194 MG/DL — HIGH (ref 70–99)
HCT VFR BLD CALC: 31.2 % — LOW (ref 39–50)
HGB BLD-MCNC: 9.4 G/DL — LOW (ref 13–17)
IMM GRANULOCYTES NFR BLD AUTO: 1.1 % — SIGNIFICANT CHANGE UP (ref 0–1.5)
LYMPHOCYTES # BLD AUTO: 1.26 K/UL — SIGNIFICANT CHANGE UP (ref 1–3.3)
LYMPHOCYTES # BLD AUTO: 6.3 % — LOW (ref 13–44)
MAGNESIUM SERPL-MCNC: 2.5 MG/DL — SIGNIFICANT CHANGE UP (ref 1.6–2.6)
MCHC RBC-ENTMCNC: 30.1 % — LOW (ref 32–36)
MCHC RBC-ENTMCNC: 30.7 PG — SIGNIFICANT CHANGE UP (ref 27–34)
MCV RBC AUTO: 102 FL — HIGH (ref 80–100)
MONOCYTES # BLD AUTO: 0.99 K/UL — HIGH (ref 0–0.9)
MONOCYTES NFR BLD AUTO: 5 % — SIGNIFICANT CHANGE UP (ref 2–14)
NEUTROPHILS # BLD AUTO: 17.36 K/UL — HIGH (ref 1.8–7.4)
NEUTROPHILS NFR BLD AUTO: 87.1 % — HIGH (ref 43–77)
NRBC # FLD: 0 K/UL — SIGNIFICANT CHANGE UP (ref 0–0)
PHOSPHATE SERPL-MCNC: 2 MG/DL — LOW (ref 2.5–4.5)
PLATELET # BLD AUTO: 294 K/UL — SIGNIFICANT CHANGE UP (ref 150–400)
PMV BLD: 11.6 FL — SIGNIFICANT CHANGE UP (ref 7–13)
POTASSIUM SERPL-MCNC: 4.8 MMOL/L — SIGNIFICANT CHANGE UP (ref 3.5–5.3)
POTASSIUM SERPL-SCNC: 4.8 MMOL/L — SIGNIFICANT CHANGE UP (ref 3.5–5.3)
PROT SERPL-MCNC: 5.9 G/DL — LOW (ref 6–8.3)
RBC # BLD: 3.06 M/UL — LOW (ref 4.2–5.8)
RBC # FLD: 15.2 % — HIGH (ref 10.3–14.5)
SODIUM SERPL-SCNC: 154 MMOL/L — HIGH (ref 135–145)
WBC # BLD: 19.92 K/UL — HIGH (ref 3.8–10.5)
WBC # FLD AUTO: 19.92 K/UL — HIGH (ref 3.8–10.5)

## 2019-09-24 PROCEDURE — 99497 ADVNCD CARE PLAN 30 MIN: CPT | Mod: 25

## 2019-09-24 RX ORDER — HYDROMORPHONE HYDROCHLORIDE 2 MG/ML
0.25 INJECTION INTRAMUSCULAR; INTRAVENOUS; SUBCUTANEOUS ONCE
Refills: 0 | Status: DISCONTINUED | OUTPATIENT
Start: 2019-09-24 | End: 2019-09-24

## 2019-09-24 RX ADMIN — Medication 4: at 06:03

## 2019-09-24 RX ADMIN — HYDROMORPHONE HYDROCHLORIDE 0.25 MILLIGRAM(S): 2 INJECTION INTRAMUSCULAR; INTRAVENOUS; SUBCUTANEOUS at 17:07

## 2019-09-24 RX ADMIN — Medication 6: at 13:11

## 2019-09-24 RX ADMIN — Medication 6: at 00:44

## 2019-09-24 RX ADMIN — Medication 4: at 23:55

## 2019-09-24 RX ADMIN — HYDROMORPHONE HYDROCHLORIDE 0.25 MILLIGRAM(S): 2 INJECTION INTRAMUSCULAR; INTRAVENOUS; SUBCUTANEOUS at 16:52

## 2019-09-24 NOTE — GOALS OF CARE CONVERSATION - ADVANCED CARE PLANNING - CONVERSATION DETAILS
Extensive conversation via phone with patient's daughter Yasmine (HCP) - as a follow-up to goals of care discussion yesterday.  Yasmine is in agreement with removal of NGT and pleasure feeds.  Goal is to maximize quality of life - MOLST completed and in chart.  Yasmine is in agreement with hospice and is requesting a [place] referral - social work and primary team aware.  NGT to be removed today.  Psychosocial support provided. Extensive conversation via phone with patient's daughter Yasmine (HCP) - as a follow-up to goals of care discussion yesterday.  Yasmine is in agreement with removal of NGT and pleasure feeds.  Goal is to maximize quality of life - MOLST completed and in chart.  Yasmine is in agreement with hospice and is requesting a [place] referral - social work and primary team aware.  NGT to be removed today.  Psychosocial support provided.    ADVANCED CARE PLANNING in the setting of prostate cancer and dementia.   Spent 30 minutes of face to face discussion addressing advanced directives and advanced care planning. Extensive conversation via phone with patient's daughter Yasmine (HCP) - as a follow-up to goals of care discussion yesterday.  Yasmine is in agreement with removal of NGT and pleasure feeds.  Goal is to maximize quality of life - MOLST completed and in chart.  Yasmine is in agreement with hospice and is requesting a [place] referral - social work and primary team aware.  NGT to be removed today.  Psychosocial support provided.    ADVANCED CARE PLANNING in the setting of advanced dementia.   Spent 30 minutes of face to face discussion addressing advanced directives and advanced care planning.

## 2019-09-24 NOTE — CHART NOTE - NSCHARTNOTEFT_GEN_A_CORE
Source: Patient [X ]  disoriented X 4  Family [ ]     other [X ] electronic chart, RN, PCA, ADS    Diet : Diet, Dysphagia 1 Pureed-Nectar Consistency Fluid (09-24-19 @ 11:58)    Patient seen for nutrition follow-up. Patient was NPO with NGT receiving Glucerna1.2 continuous feeds of 50ml/hr x 24hours tolerating well. S/p swallow evaluation on 9/23/19  Recommended "Continue Non-Oral Means of Nutrition/Hydration/Medication" and "defer to MD for ongoing GOC discussion with patient's family/caregivers." Now NGT removed today and patient on pleasure feeds of Pureed, [place] Consistency diet as discussed with ADS. Suggest continue monitor po intake and tolerance. Aspiration Precaution maintained.       Weight (kg): 61.3 (16 Sep 2019 21:34)        Pertinent Medications: dextrose 5%.  dextrose 50% Injectable  dextrose 50% Injectable  dextrose 50% Injectable  insulin lispro (HumaLOG) corrective regimen sliding scale    Pertinent Labs:  09-24 Na154 mmol/L<H> Glu 194 mg/dL<H> K+ 4.8 mmol/L Cr  1.23 mg/dL BUN 50 mg/dL<H> 09-24 Phos 2.0 mg/dL<L> 09-24 Alb 2.2 g/dL<L> 09-17 [id number] 8.1 %<H>    +1 b/l ankle edema noted.  Skin: wound right elbow noted.     Estimated Needs:   [ X] no change since previous assessment  [ ] recalculated:       Previous Nutrition Diagnosis:     [ X] Inadequate Energy Intake [ ]Inadequate Oral Intake [ ] Excessive Energy Intake     [ ] Underweight [ ] Increased Nutrient Needs [ ] Overweight/Obesity     [ ] Altered GI Function [ ] Unintended Weight Loss [ ] Food & Nutrition Related Knowledge Deficit [ ] Malnutrition      Nutrition Diagnosis is [ ] ongoing  [ ] resolved [X ] not applicable     Additional Recommendations:  1. Continue current diet order as per patient/family wishes/MD discretion.   2. Monitor PO intake and PO diet tolerance.

## 2019-09-24 NOTE — PROGRESS NOTE ADULT - ASSESSMENT
89 yo M w/ PMHx dementia, HTN, DM2, prostate CA s/p radiation seeding in remission, BIBEMS for acute unresponsiveness at assisted living facility x 1 day. nephrology consulted for hypernatremia    HYpernatremia  likely dehydration  check urine na, osmo to r/o DI  on 200cc q6 free water via NGT  Na slightly worsen today consider increase free water Q 4hrs  GOC discursion with family per chart to d/c NGT start pleasure fed. encourage free water  monitor bmp  Avoid overcorrection. 89 yo M w/ PMHx dementia, HTN, DM2, prostate CA s/p radiation seeding in remission, BIBEMS for acute unresponsiveness at assisted living facility x 1 day. nephrology consulted for hypernatremia    HYpernatremia  likely dehydration  check urine na, osmo to r/o DI  on 200cc q6 free water via NGT  Na slightly worsen today consider increase free water Q 4hrs  GOC discursion with family per chart to d/c NGT start pleasure fed. encourage free water  Consider restarting 1/2NS via IV if it's within the families wishes.   monitor bmp  Avoid overcorrection.

## 2019-09-25 ENCOUNTER — TRANSCRIPTION ENCOUNTER (OUTPATIENT)
Age: 84
End: 2019-09-25

## 2019-09-25 LAB
ACANTHOCYTES BLD QL SMEAR: SLIGHT — SIGNIFICANT CHANGE UP
ANION GAP SERPL CALC-SCNC: 14 MMO/L — SIGNIFICANT CHANGE UP (ref 7–14)
BASOPHILS # BLD AUTO: 0.02 K/UL — SIGNIFICANT CHANGE UP (ref 0–0.2)
BASOPHILS NFR BLD AUTO: 0.1 % — SIGNIFICANT CHANGE UP (ref 0–2)
BASOPHILS NFR SPEC: 0 % — SIGNIFICANT CHANGE UP (ref 0–2)
BLASTS # FLD: 0 % — SIGNIFICANT CHANGE UP (ref 0–0)
BUN SERPL-MCNC: 59 MG/DL — HIGH (ref 7–23)
CALCIUM SERPL-MCNC: 8.6 MG/DL — SIGNIFICANT CHANGE UP (ref 8.4–10.5)
CHLORIDE SERPL-SCNC: 118 MMOL/L — HIGH (ref 98–107)
CO2 SERPL-SCNC: 25 MMOL/L — SIGNIFICANT CHANGE UP (ref 22–31)
CREAT SERPL-MCNC: 1.35 MG/DL — HIGH (ref 0.5–1.3)
EOSINOPHIL # BLD AUTO: 0.11 K/UL — SIGNIFICANT CHANGE UP (ref 0–0.5)
EOSINOPHIL NFR BLD AUTO: 0.7 % — SIGNIFICANT CHANGE UP (ref 0–6)
EOSINOPHIL NFR FLD: 0.9 % — SIGNIFICANT CHANGE UP (ref 0–6)
GIANT PLATELETS BLD QL SMEAR: PRESENT — SIGNIFICANT CHANGE UP
GLUCOSE BLDC GLUCOMTR-MCNC: 188 MG/DL — HIGH (ref 70–99)
GLUCOSE BLDC GLUCOMTR-MCNC: 192 MG/DL — HIGH (ref 70–99)
GLUCOSE BLDC GLUCOMTR-MCNC: 202 MG/DL — HIGH (ref 70–99)
GLUCOSE BLDC GLUCOMTR-MCNC: 208 MG/DL — HIGH (ref 70–99)
GLUCOSE SERPL-MCNC: 211 MG/DL — HIGH (ref 70–99)
HCT VFR BLD CALC: 32.2 % — LOW (ref 39–50)
HGB BLD-MCNC: 9.6 G/DL — LOW (ref 13–17)
HYPOCHROMIA BLD QL: SLIGHT — SIGNIFICANT CHANGE UP
IMM GRANULOCYTES NFR BLD AUTO: 0.9 % — SIGNIFICANT CHANGE UP (ref 0–1.5)
LYMPHOCYTES # BLD AUTO: 1.32 K/UL — SIGNIFICANT CHANGE UP (ref 1–3.3)
LYMPHOCYTES # BLD AUTO: 8.9 % — LOW (ref 13–44)
LYMPHOCYTES NFR SPEC AUTO: 6.1 % — LOW (ref 13–44)
MCHC RBC-ENTMCNC: 29.8 % — LOW (ref 32–36)
MCHC RBC-ENTMCNC: 30.8 PG — SIGNIFICANT CHANGE UP (ref 27–34)
MCV RBC AUTO: 103.2 FL — HIGH (ref 80–100)
METAMYELOCYTES # FLD: 0 % — SIGNIFICANT CHANGE UP (ref 0–1)
MONOCYTES # BLD AUTO: 0.84 K/UL — SIGNIFICANT CHANGE UP (ref 0–0.9)
MONOCYTES NFR BLD AUTO: 5.7 % — SIGNIFICANT CHANGE UP (ref 2–14)
MONOCYTES NFR BLD: 0 % — LOW (ref 2–9)
MYELOCYTES NFR BLD: 0 % — SIGNIFICANT CHANGE UP (ref 0–0)
NEUTROPHIL AB SER-ACNC: 86 % — HIGH (ref 43–77)
NEUTROPHILS # BLD AUTO: 12.35 K/UL — HIGH (ref 1.8–7.4)
NEUTROPHILS NFR BLD AUTO: 83.7 % — HIGH (ref 43–77)
NEUTS BAND # BLD: 7 % — HIGH (ref 0–6)
NRBC # FLD: 0 K/UL — SIGNIFICANT CHANGE UP (ref 0–0)
OTHER - HEMATOLOGY %: 0 — SIGNIFICANT CHANGE UP
PLATELET # BLD AUTO: 376 K/UL — SIGNIFICANT CHANGE UP (ref 150–400)
PLATELET COUNT - ESTIMATE: NORMAL — SIGNIFICANT CHANGE UP
PMV BLD: 11.9 FL — SIGNIFICANT CHANGE UP (ref 7–13)
POIKILOCYTOSIS BLD QL AUTO: SLIGHT — SIGNIFICANT CHANGE UP
POTASSIUM SERPL-MCNC: 4.8 MMOL/L — SIGNIFICANT CHANGE UP (ref 3.5–5.3)
POTASSIUM SERPL-SCNC: 4.8 MMOL/L — SIGNIFICANT CHANGE UP (ref 3.5–5.3)
PROMYELOCYTES # FLD: 0 % — SIGNIFICANT CHANGE UP (ref 0–0)
RBC # BLD: 3.12 M/UL — LOW (ref 4.2–5.8)
RBC # FLD: 15.4 % — HIGH (ref 10.3–14.5)
SODIUM SERPL-SCNC: 157 MMOL/L — HIGH (ref 135–145)
VARIANT LYMPHS # BLD: 0 % — SIGNIFICANT CHANGE UP
WBC # BLD: 14.78 K/UL — HIGH (ref 3.8–10.5)
WBC # FLD AUTO: 14.78 K/UL — HIGH (ref 3.8–10.5)

## 2019-09-25 RX ORDER — SODIUM CHLORIDE 9 MG/ML
1000 INJECTION, SOLUTION INTRAVENOUS
Refills: 0 | Status: DISCONTINUED | OUTPATIENT
Start: 2019-09-25 | End: 2019-09-26

## 2019-09-25 RX ADMIN — Medication 4: at 18:17

## 2019-09-25 RX ADMIN — Medication 4: at 12:21

## 2019-09-25 RX ADMIN — Medication 2: at 23:47

## 2019-09-25 RX ADMIN — SODIUM CHLORIDE 50 MILLILITER(S): 9 INJECTION, SOLUTION INTRAVENOUS at 10:20

## 2019-09-25 RX ADMIN — Medication 2: at 06:03

## 2019-09-25 NOTE — DISCHARGE NOTE PROVIDER - HOSPITAL COURSE
Pt is a 89 yo M w/ PMHx dementia, HTN, DM2, prostate CA s/p radiation seeding in remission, BIBEMS for acute unresponsiveness at assisted living facility x 1 day. Patient s/p fall x 3 weeks ago. Patient with end stage dementia however as per daughter, awake/alert and minimally verbal.         Hospital course:            SDH (subdural hematoma).      -Acute on chronic SDH. Pt evaluated by neurosx in ED, no need for surgical management    - repeat CT H in 48-72 hrs to assess for progression done: Redemonstration of mixed attenuation bilateral holohemispheric and focal left-sided parafalcine subdural hematomas which appear more dense in attenuation when compared to the most recent prior CT exam. They remain similar in size. No shift of the midline structures or herniation.     Recommend continued short-term interval follow-up CT examination. No new areas of acute intracranial hemorrhage.    - no chemical prophylaxis    - fall precautions.     -Repeat HCT 9/23-  Previously noted bilateral subdural hematomas have     demonstrated expected evolutionary changes        HCAP (healthcare-associated pneumonia) Bacteremia.    - CXR demonstrates new L pleural effusion, cannot exclude underlying infection; patchy R midlung opacity may also be infectious. High suspicion for aspiration.    - S/p Meropenem    -9/22-Repeat Blood cx-neg        Cystitis.     -UA positive and evidence of cystitis on CT A/P    - S/p Meropenem    - Ucx + E coli     - midodrine being held as pt npo.         Thrombocytopenia.      -New thrombocytopenia, likely in setting of acute infections    - trend    - transfuse for <10, <15 if febrile.         Transaminitis.      -New transaminitis, likely in the setting of acute infections     - hep panel neg    - trend LFTs.         T2DM (type 2 diabetes mellitus).     -On glipizide at home    - ISS.        BECCA -Monitor Creat        Hypernatremia-     -Trend. S/p IVF    -Free water fushes q6h    -F/u renal recs         -family wishes for comfort care measures with pleasure feeds.  NGT removed, pleasure feeds started.  Stable for transfer to [place].

## 2019-09-25 NOTE — CHART NOTE - NSCHARTNOTESELECT_GEN_ALL_CORE
f/u note/Nutrition Services
BMP results/Event Note
Event Note
Nutrition Follow-up Note/Nutrition Services
Palliative Care

## 2019-09-25 NOTE — DISCHARGE NOTE PROVIDER - NSDCCPCAREPLAN_GEN_ALL_CORE_FT
PRINCIPAL DISCHARGE DIAGNOSIS  Diagnosis: Pneumonia of both lungs due to infectious organism, unspecified part of lung  Assessment and Plan of Treatment: Completed antibioitc therapy.      SECONDARY DISCHARGE DIAGNOSES  Diagnosis: Palliative care encounter  Assessment and Plan of Treatment: You are being discharged home with Hospice services and it is recommended to focus on comfort measures and supportive care. Continue with medications prescribed for pain and other symptom control. You are being discharged to [facility] for comfort care measures.    Diagnosis: Dysphagia  Assessment and Plan of Treatment: Continue pleasure feeds as prescribed.    Diagnosis: SDH (subdural hematoma)  Assessment and Plan of Treatment: Stable repeat cat scan of brain.

## 2019-09-25 NOTE — PROGRESS NOTE ADULT - ASSESSMENT
89 yo M w/ PMHx dementia, HTN, DM2, prostate CA s/p radiation seeding in remission, BIBEMS for acute unresponsiveness at assisted living facility x 1 day. nephrology consulted for hypernatremia    HYpernatremia  likely dehydration  worsen  NGT had been d/c  pt for inpatient hospice   encourage free water  Consider restarting 1/2NS via IV if it's within the families wishes.   monitor bmp  Avoid overcorrection. 89 yo M w/ PMHx dementia, HTN, DM2, prostate CA s/p radiation seeding in remission, BIBEMS for acute unresponsiveness at assisted living facility x 1 day. nephrology consulted for hypernatremia    HYpernatremia  likely dehydration  worsening  NGT had been d/c  pt for inpatient hospice   encourage free water  Consider restarting 1/2NS via IV if it's within the families wishes.   monitor bmp  Avoid overcorrection.

## 2019-09-26 ENCOUNTER — TRANSCRIPTION ENCOUNTER (OUTPATIENT)
Age: 84
End: 2019-09-26

## 2019-09-26 VITALS
OXYGEN SATURATION: 100 % | DIASTOLIC BLOOD PRESSURE: 89 MMHG | HEART RATE: 93 BPM | RESPIRATION RATE: 16 BRPM | SYSTOLIC BLOOD PRESSURE: 143 MMHG | TEMPERATURE: 99 F

## 2019-09-26 LAB
GLUCOSE BLDC GLUCOMTR-MCNC: 143 MG/DL — HIGH (ref 70–99)
GLUCOSE BLDC GLUCOMTR-MCNC: 206 MG/DL — HIGH (ref 70–99)

## 2019-09-26 RX ADMIN — Medication 4: at 06:24

## 2019-09-26 NOTE — PROGRESS NOTE ADULT - PROVIDER SPECIALTY LIST ADULT
Hospitalist
Internal Medicine
Nephrology
Nephrology
Internal Medicine

## 2019-09-26 NOTE — DISCHARGE NOTE NURSING/CASE MANAGEMENT/SOCIAL WORK - PATIENT PORTAL LINK FT
You can access the FollowMyHealth Patient Portal offered by [facility] by registering at the following website: [email or web address]. By joining [facility]’s FollowMyHealth portal, you will also be able to view your health information using other applications (apps) compatible with our system.

## 2019-09-26 NOTE — PROGRESS NOTE ADULT - SUBJECTIVE AND OBJECTIVE BOX
Patient is a 89y old  Male who presents with a chief complaint of AMS (24 Sep 2019 10:35)      INTERVAL HPI/OVERNIGHT EVENTS: see event and palliative care notes.     MEDICATIONS  (STANDING):  dextrose 5% + sodium chloride 0.9%. 1000 milliLiter(s) (50 mL/Hr) IV Continuous <Continuous>  dextrose 5%. 1000 milliLiter(s) (50 mL/Hr) IV Continuous <Continuous>  dextrose 50% Injectable 12.5 Gram(s) IV Push once  dextrose 50% Injectable 25 Gram(s) IV Push once  dextrose 50% Injectable 25 Gram(s) IV Push once  insulin lispro (HumaLOG) corrective regimen sliding scale   SubCutaneous every 6 hours    MEDICATIONS  (PRN):  dextrose 40% Gel 15 Gram(s) Oral once PRN Blood Glucose LESS THAN 70 milliGRAM(s)/deciliter  glucagon  Injectable 1 milliGRAM(s) IntraMuscular once PRN Glucose LESS THAN 70 milligrams/deciliter        Allergies    Haldol (Other)  Haldol (Unknown)    Intolerances    aspirin (Unknown)      REVIEW OF SYSTEMS: non verbal       PHYSICAL EXAM:  Vital Signs Last 24 Hrs  T(C): 36.4 (25 Sep 2019 05:49), Max: 37.3 (24 Sep 2019 14:50)  T(F): 97.5 (25 Sep 2019 05:49), Max: 99.1 (24 Sep 2019 14:50)  HR: 87 (25 Sep 2019 05:49) (87 - 114)  BP: 130/74 (25 Sep 2019 05:49) (106/63 - 146/78)  BP(mean): --  RR: 17 (25 Sep 2019 05:49) (17 - 20)  SpO2: 100% (25 Sep 2019 05:49) (93% - 100%)    GENERAL: NAD, well-groomed, well-developed  HEAD:  Atraumatic, Normocephalic  EYES: EOMI, PERRLA, conjunctiva and sclera clear  NECK: Supple, No JVD, Normal thyroid  NERVOUS SYSTEM:  Alert non verbal   CHEST/LUNG: Clear to auscultation bilaterally; No rales, rhonchi, wheezing, or rubs  HEART: S1S2 regular, without murmur, rub nor gallop  ABDOMEN: Soft, Nontender, Nondistended; Bowel sounds present  EXTREMITIES:  2+ Peripheral Pulses, No clubbing, cyanosis, or edema      LABS:                        9.6    14.78 )-----------( 376      ( 25 Sep 2019 06:30 )             32.2     25 Sep 2019 06:30    157    |  118    |  59     ----------------------------<  211    4.8     |  25     |  1.35     Ca    8.6        25 Sep 2019 06:30        CAPILLARY BLOOD GLUCOSE      POCT Blood Glucose.: 188 mg/dL (25 Sep 2019 05:57)  POCT Blood Glucose.: 201 mg/dL (24 Sep 2019 23:31)  POCT Blood Glucose.: 129 mg/dL (24 Sep 2019 18:02)  POCT Blood Glucose.: 266 mg/dL (24 Sep 2019 13:09)          assessment:  Decrease in WBC noted.      Plan:   ? Hospice placement.  Pleasure feeds
[facility] – [place], [place] NEPHROLOGY PRACTICE   MD FIORELLA JACKSON, DO MELODY BAXTER, CHICA ROBIN    TEL:  OFFICE: [phone]  DR REDMOND CELL: [phone]  DR. PETERS CELL: [phone]  DR. BAXTER CELL: [phone]  GUSTAVO ROSALES CELL: [phone]        Patient is a 89y old  Male who presents with a chief complaint of AMS (25 Sep 2019 11:56)      Patient seen and examined at bedside.     VITALS:  T(F): 98.2 (09-25-19 @ 09:31), Max: 98.3 (09-24-19 @ 22:03)  HR: 82 (09-25-19 @ 09:31)  BP: 124/64 (09-25-19 @ 09:31)  RR: 18 (09-25-19 @ 09:31)  SpO2: 100% (09-25-19 @ 09:31)  Wt(kg): --        PHYSICAL EXAM:  Constitutional: NAD  Neck: No JVD  Respiratory: CTAB, no wheezes, rales or rhonchi  Cardiovascular: S1, S2, RRR  Gastrointestinal: BS+, soft, NT/ND  Extremities: No peripheral edema    Hospital Medications:   MEDICATIONS  (STANDING):  dextrose 5% + sodium chloride 0.9%. 1000 milliLiter(s) (50 mL/Hr) IV Continuous <Continuous>  dextrose 5%. 1000 milliLiter(s) (50 mL/Hr) IV Continuous <Continuous>  dextrose 50% Injectable 12.5 Gram(s) IV Push once  dextrose 50% Injectable 25 Gram(s) IV Push once  dextrose 50% Injectable 25 Gram(s) IV Push once  insulin lispro (HumaLOG) corrective regimen sliding scale   SubCutaneous every 6 hours      LABS:  09-25    157<H>  |  118<H>  |  59<H>  ----------------------------<  211<H>  4.8   |  25  |  1.35<H>    Ca    8.6      25 Sep 2019 06:30  Phos  2.0     09-24  Mg     2.5     09-24    TPro  5.9<L>  /  Alb  2.2<L>  /  TBili  0.7  /  DBili      /  AST  48<H>  /  ALT  42<H>  /  AlkPhos  174<H>  09-24    Creatinine Trend: 1.35 <--, 1.23 <--, 1.24 <--, 1.30 <--, 1.27 <--, 1.36 <--, 1.34 <--, 1.33 <--, 1.50 <--, 1.55 <--                                9.6    14.78 )-----------( 376      ( 25 Sep 2019 06:30 )             32.2     Urine Studies:  Urinalysis - [09-15-19 @ 17:23]      Color YELLOW / Appearance Lt TURBID / SG 1.014 / pH 6.0      Gluc 100 / Ketone NEGATIVE  / Bili NEGATIVE / Urobili NORMAL       Blood MODERATE / Protein 70 / Leuk Est LARGE / Nitrite POSITIVE      RBC 6-10 / WBC >50 / Hyaline NEGATIVE / Gran  / Sq Epi OCC / Non Sq Epi  / Bacteria FEW    Urine Creatinine 64.00      [09-23-19 @ 19:37]  Urine Sodium < 20      [09-23-19 @ 19:37]  Urine Potassium 44.1      [09-23-19 @ 19:37]  Urine Chloride < 20      [09-23-19 @ 19:37]    HbA1c 8.1      [09-17-19 @ 06:18]  TSH 0.64      [04-10-19 @ 06:45]  Lipid: chol 188, TG 72, HDL 50,       [04-10-19 @ 06:45]    HBsAg Nonreactive      [09-17-19 @ 06:18]  HCV 0.13, Nonreactive Hepatitis C AB  S/CO Ratio                        Interpretation  < 1.00                                   Non-Reactive  1.00 - 4.99                         Weakly-Reactive  >= 5.00                                Reactive  Non-Reactive: Aperson with a non-reactive HCV antibody  result is considered uninfected.  No further action is  needed unless recent infection is suspected.  In these  cases, consider repeat testing later to detect  seroconversion..  Weakly-Reactive: HCV antibody test is abnormal, HCV RNA  Qualitative test will follow.  Reactive: HCV antibody test is abnormal, HCV RNA  Qualitative test will follow.  Note: HCV antibody testing is performed on the Abbott   system.      [09-17-19 @ 06:18]      RADIOLOGY & ADDITIONAL STUDIES:
[facility] [place]   MD FIORELLA JACKSON, DO MELODY BAXTER, CHICA ROBIN    TEL:  OFFICE: [phone]  DR REDMOND CELL: [phone]  DR. PETERS CELL: [phone]  DR. BAXTER CELL: [phone]  GUSTAVO ROSALES CELL: [phone]        Patient is a 89y old  Male who presents with a chief complaint of AMS (24 Sep 2019 09:07)      Patient seen and examined at bedside.     VITALS:  T(F): 97.8 (09-24-19 @ 05:32), Max: 98.7 (09-23-19 @ 14:28)  HR: 84 (09-24-19 @ 05:32)  BP: 118/62 (09-24-19 @ 05:32)  RR: 19 (09-24-19 @ 05:32)  SpO2: 98% (09-24-19 @ 05:32)  Wt(kg): --    09-23 @ 07:01  -  09-24 @ 07:00  --------------------------------------------------------  IN: 400 mL / OUT: 0 mL / NET: 400 mL          PHYSICAL EXAM:  Constitutional: NAD  Neck: No JVD  Respiratory: CTAB, no wheezes, rales or rhonchi  Cardiovascular: S1, S2, RRR  Gastrointestinal: BS+, soft, NT/ND, +NGT  Extremities: No peripheral edema    Hospital Medications:   MEDICATIONS  (STANDING):  dextrose 5%. 1000 milliLiter(s) (50 mL/Hr) IV Continuous <Continuous>  dextrose 50% Injectable 12.5 Gram(s) IV Push once  dextrose 50% Injectable 25 Gram(s) IV Push once  dextrose 50% Injectable 25 Gram(s) IV Push once  insulin lispro (HumaLOG) corrective regimen sliding scale   SubCutaneous every 6 hours      LABS:  09-24    154<H>  |  112<H>  |  50<H>  ----------------------------<  194<H>  4.8   |  27  |  1.23    Ca    8.6      24 Sep 2019 05:00  Phos  2.0     09-24  Mg     2.5     09-24    TPro  5.9<L>  /  Alb  2.2<L>  /  TBili  0.7  /  DBili      /  AST  48<H>  /  ALT  42<H>  /  AlkPhos  174<H>  09-24    Creatinine Trend: 1.23 <--, 1.24 <--, 1.30 <--, 1.27 <--, 1.36 <--, 1.34 <--, 1.33 <--, 1.50 <--, 1.55 <--, 1.62 <--    Phosphorus Level, Serum: 2.0 mg/dL (09-24 @ 05:00)  Albumin, Serum: 2.2 g/dL (09-24 @ 05:00)                              9.4    19.92 )-----------( 294      ( 24 Sep 2019 05:00 )             31.2     Urine Studies:  Urinalysis - [09-15-19 @ 17:23]      Color YELLOW / Appearance Lt TURBID / SG 1.014 / pH 6.0      Gluc 100 / Ketone NEGATIVE  / Bili NEGATIVE / Urobili NORMAL       Blood MODERATE / Protein 70 / Leuk Est LARGE / Nitrite POSITIVE      RBC 6-10 / WBC >50 / Hyaline NEGATIVE / Gran  / Sq Epi OCC / Non Sq Epi  / Bacteria FEW    Urine Creatinine 64.00      [09-23-19 @ 19:37]  Urine Sodium < 20      [09-23-19 @ 19:37]  Urine Potassium 44.1      [09-23-19 @ 19:37]  Urine Chloride < 20      [09-23-19 @ 19:37]    HbA1c 8.1      [09-17-19 @ 06:18]  TSH 0.64      [04-10-19 @ 06:45]  Lipid: chol 188, TG 72, HDL 50,       [04-10-19 @ 06:45]    HBsAg Nonreactive      [09-17-19 @ 06:18]  HCV 0.13, Nonreactive Hepatitis C AB  S/CO Ratio                        Interpretation  < 1.00                                   Non-Reactive  1.00 - 4.99                         Weakly-Reactive  >= 5.00                                Reactive  Non-Reactive: Aperson with a non-reactive HCV antibody  result is considered uninfected.  No further action is  needed unless recent infection is suspected.  In these  cases, consider repeat testing later to detect  seroconversion..  Weakly-Reactive: HCV antibody test is abnormal, HCV RNA  Qualitative test will follow.  Reactive: HCV antibody test is abnormal, HCV RNA  Qualitative test will follow.  Note: HCV antibody testing is performed on the Abbott   system.      [09-17-19 @ 06:18]      RADIOLOGY & ADDITIONAL STUDIES:
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       MEDICATIONS  (STANDING):  dextrose 5%. 1000 milliLiter(s) (50 mL/Hr) IV Continuous <Continuous>  dextrose 50% Injectable 12.5 Gram(s) IV Push once  dextrose 50% Injectable 25 Gram(s) IV Push once  dextrose 50% Injectable 25 Gram(s) IV Push once  insulin lispro (HumaLOG) corrective regimen sliding scale   SubCutaneous every 6 hours  meropenem  IVPB 1000 milliGRAM(s) IV Intermittent every 12 hours    MEDICATIONS  (PRN):  dextrose 40% Gel 15 Gram(s) Oral once PRN Blood Glucose LESS THAN 70 milliGRAM(s)/deciliter  glucagon  Injectable 1 milliGRAM(s) IntraMuscular once PRN Glucose LESS THAN 70 milligrams/deciliter      Vital Signs Last 24 Hrs  T(C): 34.1 (16 Sep 2019 12:16), Max: 36.6 (16 Sep 2019 06:41)  T(F): 93.3 (16 Sep 2019 12:16), Max: 97.9 (16 Sep 2019 06:41)  HR: 80 (16 Sep 2019 12:00) (75 - 84)  BP: 136/90 (16 Sep 2019 12:00) (103/60 - 137/90)  BP(mean): --  RR: 15 (16 Sep 2019 12:00) (15 - 16)  SpO2: 95% (16 Sep 2019 12:00) (95% - 99%)  CAPILLARY BLOOD GLUCOSE      POCT Blood Glucose.: 102 mg/dL (16 Sep 2019 12:09)  POCT Blood Glucose.: 222 mg/dL (16 Sep 2019 07:59)  POCT Blood Glucose.: 124 mg/dL (15 Sep 2019 16:38)      PHYSICAL EXAM  GENERAL: Lethargic   CHEST/LUNG: Clear to auscultation bilaterally; No wheeze on anterior lung exam, pt unable to participate in lung exam   HEART: Regular rate and rhythm; No murmurs, rubs, or gallops  ABDOMEN: Soft, +grimacing in pain with suprapubic palpation   EXTREMITIES:  2+ Peripheral Pulses, No clubbing, cyanosis, or edema      LABS:                        9.7    12.79 )-----------( 62       ( 16 Sep 2019 10:28 )             30.0         143  |  103  |  47<H>  ----------------------------<  169<H>  5.4<H>   |  24  |  1.54<H>    Ca    9.2      16 Sep 2019 10:28  Phos  3.6       Mg     2.3         TPro  6.0  /  Alb  2.8<L>  /  TBili  0.4  /  DBili  x   /  AST  41<H>  /  ALT  69<H>  /  AlkPhos  175<H>  09-15    PT/INR - ( 15 Sep 2019 17:23 )   PT: 12.8 SEC;   INR: 1.15          PTT - ( 15 Sep 2019 17:23 )  PTT:40.0 SEC      Urinalysis Basic - ( 15 Sep 2019 17:23 )    Color: YELLOW / Appearance: Lt TURBID / S.014 / pH: 6.0  Gluc: 100 / Ketone: NEGATIVE  / Bili: NEGATIVE / Urobili: NORMAL   Blood: MODERATE / Protein: 70 / Nitrite: POSITIVE   Leuk Esterase: LARGE / RBC: 6-10 / WBC >50   Sq Epi: OCC / Non Sq Epi: x / Bacteria: FEW    Culture - Urine (09.15.19 @ 17:46)    Culture - Urine:   EC^Escherichia coli  COLONY COUNT: > = 100,000 CFU/ML    Specimen Source: URINE CATHETER    Culture - Blood (09.15.19 @ 17:43)    Culture - Blood:   # BCID=[email or web address]    -  Escherichia coli: + DETECT GUALBERTO    Specimen Source: BLOOD VENOUS    Organism: BLOOD CULTURE PCR  ***Blood Panel PCR results on this specimen are available  approximately 3 hours after the Gram stain result***  Gram stain, PCR, and/or culture results may not always  correspond due to difference in methodologies  ------------------------------------------------------------  This PCR assay was performed using [employer].  The  following targets are tested for:  Enterococcus, vancomycin  resistant enterococci, Listeria monocytogenes,  coagulase  negative staphylococci, S. aureus, methicillin resistant S.  aureus, Streptococcus agalactiae (Group B), S. pneumoniae,  S. pyogenes (Group A), Acinetobacter baumannii, Enterobacter  cloacae, E. coli, Klebsiella oxytoca, K. pneumoniae, Proteus  sp., Serratia marcescens, Haemophilus influenzae, Neisseria  meningitidis, Pseudomonas aeruginosa, Candida albicans, C.  glabrata, C. krusei, C. parapsilosis, C. tropicalis and the  KPC resistance gene.  **NOTE: Due to technical problems, Proteus sp. will NOT be  reported as part of the BCID panel until further notice.    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  No significant mass effect   or midline shift. No displaced calvarial fracture.    If symptoms persist, consider short interval follow-up head CT or brain   MRI, if there are no MRI contraindications.    CERVICAL SPINE CT:     No evidence for acute fracture or traumatic malalignment. Degenerative   changes.  No significant interval change compared to the prior CTof   19.    < end of copied text >    < from: CT Abdomen and Pelvis w/ IV Cont (19 @ 00:24) >  IMPRESSION:    Likely cystitis.  Diverticulosis.  Gallstones.  Trace bilateral pleural effusions.    < end of copied text >    < from: Xray Chest 1 View- PORTABLE-Urgent (09.15.19 @ 17:43) >    IMPRESSION: Multifocal opacities could represent pneumonia.          < end of copied text >    Consultant(s) Notes Reviewed:  Neurosurgery
Patient is a 89y old  Male who presents with a chief complaint of AMS (16 Sep 2019 13:49)    Medical care reque  INTERVAL HPI/OVERNIGHT EVENTS:    MEDICATIONS  (STANDING):  dextrose 5%. 1000 milliLiter(s) (50 mL/Hr) IV Continuous <Continuous>  dextrose 50% Injectable 12.5 Gram(s) IV Push once  dextrose 50% Injectable 25 Gram(s) IV Push once  dextrose 50% Injectable 25 Gram(s) IV Push once  insulin lispro (HumaLOG) corrective regimen sliding scale   SubCutaneous every 6 hours  meropenem  IVPB 1000 milliGRAM(s) IV Intermittent every 12 hours    MEDICATIONS  (PRN):  dextrose 40% Gel 15 Gram(s) Oral once PRN Blood Glucose LESS THAN 70 milliGRAM(s)/deciliter  glucagon  Injectable 1 milliGRAM(s) IntraMuscular once PRN Glucose LESS THAN 70 milligrams/deciliter        Orders last 24 hours:  Complete Blood Count + Automated Diff: STAT (09-16-19 @ 09:57)  Basic Metabolic Panel w/Mg & Inorg Phos: STAT (09-16-19 @ 09:57)  furosemide   Injectable: [Ordered as LASIX Injectable]  40 milliGRAM(s), IV Push, once, Stop After 1 Doses  Administration Instructions: Max IV Push dose 80 milliGRAM(s)  IF IV PUSH, ADMINISTER 40 mG PER MINUTE  Provider's Contact #: [phone] (09-16-19 @ 11:44)  POCT  Blood Glucose (09-16-19 @ 12:10)  Basic Metabolic Panel w/Mg & Inorg Phos: 16:00 (09-16-19 @ 12:15)  CT Head No Cont: Routine   Indication: Intracranial Hemorrhage  Transport: Stretcher-Crib  Exam Completed (09-16-19 @ 12:17)  Culture - Blood: AM Sched. Collection:17-Sep-2019 06:00  Specimen Source: Blood-Peripheral (09-16-19 @ 12:52)  Culture - Blood: AM Sched. Collection:17-Sep-2019 06:00  Specimen Source: Blood-Venous (09-16-19 @ 12:52)  meropenem  IVPB:   1000 milliGRAM(s) in sodium chloride 0.9% 50 milliLiter(s), IV Intermittent, every 12 hours, infuse over 30 Minute(s), Stop After 5 Days  Indication: Bacteremia  Provider's Contact #: [phone]     (Adult Calc Info: Calculation : (1,000 mG Flat Dose)  Calculated Dose : 1,000 mG) (09-16-19 @ 13:45)  12 Lead ECG:   Provider's Contact #: [phone] (09-16-19 @ 14:07)  POCT  Blood Glucose (09-16-19 @ 17:25)  POCT  Blood Glucose (09-16-19 @ 23:08)  POCT  Blood Glucose (09-17-19 @ 05:27)      Allergies    Haldol (Other)  Haldol (Unknown)    Intolerances    aspirin (Unknown)      REVIEW OF SYSTEMS:  Non-verbal       PHYSICAL EXAM:  Vital Signs Last 24 Hrs  T(C): 37.1 (17 Sep 2019 05:06), Max: 37.1 (17 Sep 2019 05:06)  T(F): 98.8 (17 Sep 2019 05:06), Max: 98.8 (17 Sep 2019 05:06)  HR: 82 (17 Sep 2019 05:06) (79 - 82)  BP: 113/57 (17 Sep 2019 05:06) (113/57 - 136/90)  BP(mean): --  RR: 17 (17 Sep 2019 05:06) (15 - 17)  SpO2: 98% (17 Sep 2019 05:06) (95% - 98%)    GENERAL: NAD, well-groomed, well-developed  HEAD:  Atraumatic, Normocephalic  EYES: EOMI, PERRLA, conjunctiva and sclera clear  NECK: Supple, No JVD, Normal thyroid  NERVOUS SYSTEM:  non verbal   CHEST/LUNG: Clear to auscultation bilaterally; No rales, rhonchi, wheezing, or rubs  HEART: S1S2 regular, without murmur, rub nor gallop  ABDOMEN: Soft, Nontender, Nondistended; Bowel sounds present        LABS:                        9.9    12.66 )-----------( 65       ( 17 Sep 2019 06:18 )             30.1     17 Sep 2019 06:18    145    |  108    |  52     ----------------------------<  112    4.7     |  23     |  1.70     Ca    8.8        17 Sep 2019 06:18  Phos  3.8       16 Sep 2019 16:29  Mg     2.2       17 Sep 2019 06:18      PT/INR - ( 15 Sep 2019 17:23 )   PT: 12.8 SEC;   INR: 1.15          PTT - ( 15 Sep 2019 17:23 )  PTT:40.0 SEC  CAPILLARY BLOOD GLUCOSE      POCT Blood Glucose.: 116 mg/dL (17 Sep 2019 05:26)  POCT Blood Glucose.: 121 mg/dL (16 Sep 2019 23:03)  POCT Blood Glucose.: 104 mg/dL (16 Sep 2019 17:24)  POCT Blood Glucose.: 102 mg/dL (16 Sep 2019 12:09)            assessment:  Gram negative sepsis.   s/p fall     Plan:  continue antibiotics   DVT prophylaxis   supportive care.     Care Discussed with Consultants/Other Providers: pt's wife.   Avoid diuretics.
Patient is a 89y old  Male who presents with a chief complaint of AMS (17 Sep 2019 09:56)      INTERVAL HPI/OVERNIGHT EVENTS: none.  More responsive today     MEDICATIONS  (STANDING):  dextrose 5%. 1000 milliLiter(s) (50 mL/Hr) IV Continuous <Continuous>  dextrose 5%. 1000 milliLiter(s) (50 mL/Hr) IV Continuous <Continuous>  dextrose 50% Injectable 12.5 Gram(s) IV Push once  dextrose 50% Injectable 25 Gram(s) IV Push once  dextrose 50% Injectable 25 Gram(s) IV Push once  insulin lispro (HumaLOG) corrective regimen sliding scale   SubCutaneous every 6 hours  meropenem  IVPB 1000 milliGRAM(s) IV Intermittent every 12 hours    MEDICATIONS  (PRN):  dextrose 40% Gel 15 Gram(s) Oral once PRN Blood Glucose LESS THAN 70 milliGRAM(s)/deciliter  glucagon  Injectable 1 milliGRAM(s) IntraMuscular once PRN Glucose LESS THAN 70 milligrams/deciliter              Allergies    Haldol (Other)  Haldol (Unknown)    Intolerances    aspirin (Unknown)      REVIEW OF SYSTEMS:  CARDIOVASCULAR: No chest pain, palpitations, dizziness, or leg swelling; no shortness of breath     RESPIRATORY: No cough, wheezing, chills or hemoptysis; No shortness of breath    GASTROINTESTINAL: No abdominal or epigastric pain. No nausea, vomiting, or hematemesis; No diarrhea or constipation. No melena or hematochezia.    NEUROLOGICAL: No headaches, memory loss, loss of strength, numbness      PHYSICAL EXAM:  Vital Signs Last 24 Hrs  T(C): 36.9 (18 Sep 2019 06:15), Max: 36.9 (18 Sep 2019 06:15)  T(F): 98.4 (18 Sep 2019 06:15), Max: 98.4 (18 Sep 2019 06:15)  HR: 79 (18 Sep 2019 06:15) (63 - 86)  BP: 116/68 (18 Sep 2019 06:15) (108/78 - 141/86)  BP(mean): --  RR: 18 (18 Sep 2019 06:15) (17 - 18)  SpO2: 98% (18 Sep 2019 06:15) (96% - 100%)    GENERAL: NAD, well-groomed, well-developed  HEAD:  Atraumatic, Normocephalic  EYES: EOMI, PERRLA, conjunctiva and sclera clear  NECK: Supple, No JVD, Normal thyroid  NERVOUS SYSTEM:  Arrousable   CHEST/LUNG: Clear to auscultation bilaterally; No rales, rhonchi, wheezing, or rubs  HEART: S1S2 regular, without murmur, rub nor gallop  ABDOMEN: Soft, Nontender, Nondistended; Bowel sounds present  EXTREMITIES:  2+ Peripheral Pulses, No clubbing, cyanosis, or edema      LABS:                        10.6   11.59 )-----------( 71       ( 18 Sep 2019 05:00 )             33.0     18 Sep 2019 05:00    150    |  109    |  70     ----------------------------<  127    4.5     |  23     |  1.62     Ca    9.2        18 Sep 2019 05:00  Mg     2.5       18 Sep 2019 05:00        CAPILLARY BLOOD GLUCOSE      POCT Blood Glucose.: 142 mg/dL (18 Sep 2019 08:45)  POCT Blood Glucose.: 143 mg/dL (18 Sep 2019 06:18)  POCT Blood Glucose.: 163 mg/dL (17 Sep 2019 23:18)  POCT Blood Glucose.: 135 mg/dL (17 Sep 2019 18:21)  POCT Blood Glucose.: 136 mg/dL (17 Sep 2019 12:56)           assessment:  GNR bacteremia, DM, WBC resolving     Plan:   complete antibiotic course.  Check orthostatics.  No AC, check ECG     Care Discussed with Consultants/Other Providers:  patient's daughter
Patient is a 89y old  Male who presents with a chief complaint of AMS (18 Sep 2019 09:45)      INTERVAL HPI/OVERNIGHT EVENTS: pt more awak     MEDICATIONS  (STANDING):  dextrose 5%. 1000 milliLiter(s) (50 mL/Hr) IV Continuous <Continuous>  dextrose 50% Injectable 12.5 Gram(s) IV Push once  dextrose 50% Injectable 25 Gram(s) IV Push once  dextrose 50% Injectable 25 Gram(s) IV Push once  insulin lispro (HumaLOG) corrective regimen sliding scale   SubCutaneous every 6 hours  meropenem  IVPB 1000 milliGRAM(s) IV Intermittent every 12 hours    MEDICATIONS  (PRN):  dextrose 40% Gel 15 Gram(s) Oral once PRN Blood Glucose LESS THAN 70 milliGRAM(s)/deciliter  glucagon  Injectable 1 milliGRAM(s) IntraMuscular once PRN Glucose LESS THAN 70 milligrams/deciliter        Orders last 24 hours:  12 Lead ECG:   Provider's Contact #: [phone] (09-18-19 @ 09:51)  sodium chloride 0.9%.: Solution, 1000 milliLiter(s) infuse at 75 mL/Hr  Provider's Contact #: [phone] (09-18-19 @ 11:18)  POCT  Blood Glucose (09-18-19 @ 12:19)  POCT  Blood Glucose (09-18-19 @ 17:29)  POCT  Blood Glucose (09-19-19 @ 02:06)  POCT  Blood Glucose (09-19-19 @ 02:06)  POCT  Blood Glucose (09-19-19 @ 06:36)  Complete Blood Count + Automated Diff: AM Sched. Collection: 20-Sep-2019 06:00 (09-19-19 @ 08:12)  Basic Metabolic Panel w/Mg & Inorg Phos: AM Sched. Collection: 20-Sep-2019 06:00 (09-19-19 @ 08:12)      Allergies    Haldol (Other)  Haldol (Unknown)    Intolerances    aspirin (Unknown)          PHYSICAL EXAM:  Vital Signs Last 24 Hrs  T(C): 36.6 (19 Sep 2019 05:20), Max: 36.6 (19 Sep 2019 05:20)  T(F): 97.8 (19 Sep 2019 05:20), Max: 97.8 (19 Sep 2019 05:20)  HR: 66 (19 Sep 2019 05:20) (66 - 85)  BP: 141/72 (19 Sep 2019 05:20) (141/72 - 150/75)  BP(mean): --  RR: 18 (19 Sep 2019 05:20) (17 - 18)  SpO2: 97% (19 Sep 2019 05:20) (97% - 97%)    GENERAL: NAD, well-groomed, well-developed  HEAD:  Atraumatic, Normocephalic  EYES: EOMI, PERRLA, conjunctiva and sclera clear  NECK: Supple, No JVD, Normal thyroid    CHEST/LUNG: Clear to auscultation bilaterally; No rales, rhonchi, wheezing, or rubs  HEART: S1S2 regular, without murmur, rub nor gallop  ABDOMEN: Soft, Nontender, Nondistended; Bowel sounds present  EXTREMITIES:  2+ Peripheral Pulses, No clubbing, cyanosis, or edema      LABS:                        10.5   12.43 )-----------( 88       ( 19 Sep 2019 06:05 )             33.8     19 Sep 2019 06:35    155    |  117    |  81     ----------------------------<  88     4.1     |  25     |  1.55     Ca    9.0        19 Sep 2019 06:35  Mg     2.6       19 Sep 2019 06:35        CAPILLARY BLOOD GLUCOSE      POCT Blood Glucose.: 89 mg/dL (19 Sep 2019 06:33)  POCT Blood Glucose.: 152 mg/dL (19 Sep 2019 01:53)  POCT Blood Glucose.: 136 mg/dL (18 Sep 2019 17:28)  POCT Blood Glucose.: 193 mg/dL (18 Sep 2019 12:18)    :        assessment:  pt stable.  More awake slight increase in wbc.     Plan:   continue antibiotics   Swallow eval  hope to start feeding     D/W PA, patient's wife         Care Discussed with Consultants/Other Providers:
Patient is a 89y old  Male who presents with a chief complaint of AMS (19 Sep 2019 09:42)      INTERVAL HPI/OVERNIGHT EVENTS: more awake and combatative     MEDICATIONS  (STANDING):  dextrose 5%. 1000 milliLiter(s) (75 mL/Hr) IV Continuous <Continuous>  dextrose 5%. 1000 milliLiter(s) (50 mL/Hr) IV Continuous <Continuous>  dextrose 50% Injectable 12.5 Gram(s) IV Push once  dextrose 50% Injectable 25 Gram(s) IV Push once  dextrose 50% Injectable 25 Gram(s) IV Push once  insulin lispro (HumaLOG) corrective regimen sliding scale   SubCutaneous every 6 hours  meropenem  IVPB 1000 milliGRAM(s) IV Intermittent every 12 hours    MEDICATIONS  (PRN):  dextrose 40% Gel 15 Gram(s) Oral once PRN Blood Glucose LESS THAN 70 milliGRAM(s)/deciliter  glucagon  Injectable 1 milliGRAM(s) IntraMuscular once PRN Glucose LESS THAN 70 milligrams/deciliter              Allergies    Haldol (Other)  Haldol (Unknown)    Intolerances    aspirin (Unknown)            PHYSICAL EXAM:  Vital Signs Last 24 Hrs  T(C): 36.8 (20 Sep 2019 05:45), Max: 36.8 (20 Sep 2019 05:45)  T(F): 98.2 (20 Sep 2019 05:45), Max: 98.2 (20 Sep 2019 05:45)  HR: 89 (20 Sep 2019 05:45) (85 - 91)  BP: 147/83 (20 Sep 2019 05:45) (147/65 - 148/85)  BP(mean): --  RR: 17 (20 Sep 2019 05:45) (17 - 18)  SpO2: 100% (20 Sep 2019 05:45) (97% - 100%)    GENERAL: NAD, well-groomed, well-developed  HEAD:  Atraumatic, Normocephalic  EYES: EOMI, PERRLA, conjunctiva and sclera clear  NECK: Supple, No JVD, Normal thyroid    CHEST/LUNG: Clear to auscultation bilaterally; No rales, rhonchi, wheezing, or rubs  HEART: S1S2 regular, without murmur, rub nor gallop  ABDOMEN: Soft, Nontender, Nondistended; Bowel sounds present  EXTREMITIES:, No clubbing, cyanosis, or edema      LABS:                        10.1   11.64 )-----------( 95       ( 20 Sep 2019 06:31 )             31.5     20 Sep 2019 06:31    154    |  118    |  74     ----------------------------<  155    3.8     |  23     |  1.33     Ca    8.6        20 Sep 2019 06:31  Phos  2.7       20 Sep 2019 06:31  Mg     2.5       20 Sep 2019 06:31        CAPILLARY BLOOD GLUCOSE      POCT Blood Glucose.: 151 mg/dL (20 Sep 2019 05:28)  POCT Blood Glucose.: 173 mg/dL (19 Sep 2019 23:58)  POCT Blood Glucose.: 193 mg/dL (19 Sep 2019 17:34)  POCT Blood Glucose.: 117 mg/dL (19 Sep 2019 12:44)          assessment:  labs improved.   WBC, Sodium are down, as BUN/Cr    Plan:   IV fluids and antibiotics.  Fall precautions.  Swallow re-eval.     D/W PA
Patient is a 89y old  Male who presents with a chief complaint of AMS (20 Sep 2019 09:24)      INTERVAL HPI/OVERNIGHT EVENTS: pt transferred to 8N     MEDICATIONS  (STANDING):  dextrose 5%. 1000 milliLiter(s) (75 mL/Hr) IV Continuous <Continuous>  dextrose 5%. 1000 milliLiter(s) (50 mL/Hr) IV Continuous <Continuous>  dextrose 50% Injectable 12.5 Gram(s) IV Push once  dextrose 50% Injectable 25 Gram(s) IV Push once  dextrose 50% Injectable 25 Gram(s) IV Push once  insulin lispro (HumaLOG) corrective regimen sliding scale   SubCutaneous every 6 hours  meropenem  IVPB 1000 milliGRAM(s) IV Intermittent every 12 hours    MEDICATIONS  (PRN):  dextrose 40% Gel 15 Gram(s) Oral once PRN Blood Glucose LESS THAN 70 milliGRAM(s)/deciliter  glucagon  Injectable 1 milliGRAM(s) IntraMuscular once PRN Glucose LESS THAN 70 milligrams/deciliter              Allergies    Haldol (Other)  Haldol (Unknown)    Intolerances    aspirin (Unknown)            PHYSICAL EXAM:  Vital Signs Last 24 Hrs  T(C): 36.3 (21 Sep 2019 02:36), Max: 36.3 (20 Sep 2019 13:12)  T(F): 97.3 (21 Sep 2019 02:36), Max: 97.3 (20 Sep 2019 13:12)  HR: 95 (21 Sep 2019 02:36) (85 - 103)  BP: 140/87 (21 Sep 2019 02:36) (133/78 - 158/89)  BP(mean): --  RR: 18 (21 Sep 2019 02:36) (18 - 18)  SpO2: 93% (21 Sep 2019 02:36) (93% - 100%)    GENERAL: NAD, well-groomed, well-developed  HEAD:  Atraumatic, Normocephalic  EYES: EOMI, PERRLA, conjunctiva and sclera clear  NECK: Supple, No JVD, Normal thyroid  NERVOUS SYSTEM:  Alert, combatitive   CHEST/LUNG: Clear to auscultation bilaterally; No rales, rhonchi, wheezing, or rubs  HEART: S1S2 regular, without murmur, rub nor gallop  ABDOMEN: Soft, Nontender, Nondistended; Bowel sounds present  EXTREMITIES:  2+ Peripheral Pulses, No clubbing, cyanosis, or edema      LABS:      Ca    8.6        20 Sep 2019 06:31        CAPILLARY BLOOD GLUCOSE      POCT Blood Glucose.: 162 mg/dL (21 Sep 2019 06:49)  POCT Blood Glucose.: 145 mg/dL (20 Sep 2019 23:54)  POCT Blood Glucose.: 130 mg/dL (20 Sep 2019 17:35)  POCT Blood Glucose.: 143 mg/dL (20 Sep 2019 14:12)         Consultant(s) Notes Reviewed:        assessment:  to recheck labs.  PT REQUIRES RESTRAINT TO ALLOW NGT FEEDING.    await follow up swallow eval.  Continue antibiotics for UTI     D/W PA, pt's family.
Patient is a 89y old  Male who presents with a chief complaint of AMS (21 Sep 2019 07:39)      INTERVAL HPI/OVERNIGHT EVENTS: none     MEDICATIONS  (STANDING):  dextrose 5%. 1000 milliLiter(s) (75 mL/Hr) IV Continuous <Continuous>  dextrose 5%. 1000 milliLiter(s) (50 mL/Hr) IV Continuous <Continuous>  dextrose 50% Injectable 12.5 Gram(s) IV Push once  dextrose 50% Injectable 25 Gram(s) IV Push once  dextrose 50% Injectable 25 Gram(s) IV Push once  insulin lispro (HumaLOG) corrective regimen sliding scale   SubCutaneous every 6 hours    MEDICATIONS  (PRN):  dextrose 40% Gel 15 Gram(s) Oral once PRN Blood Glucose LESS THAN 70 milliGRAM(s)/deciliter  glucagon  Injectable 1 milliGRAM(s) IntraMuscular once PRN Glucose LESS THAN 70 milligrams/deciliter            Allergies    Haldol (Other)  Haldol (Unknown)    Intolerances    aspirin (Unknown)      REVIEW OF SYSTEMS:  CARDIOVASCULAR: No chest pain, palpitations, dizziness, or leg swelling; no shortness of breath     RESPIRATORY: No cough, wheezing, chills or hemoptysis; No shortness of breath    GASTROINTESTINAL: No abdominal or epigastric pain. No nausea, vomiting, or hematemesis; No diarrhea or constipation. No melena or hematochezia.    NEUROLOGICAL: No headaches, memory loss, loss of strength, numbness      PHYSICAL EXAM:  Vital Signs Last 24 Hrs  T(C): 36.7 (22 Sep 2019 05:25), Max: 36.7 (22 Sep 2019 05:25)  T(F): 98.1 (22 Sep 2019 05:25), Max: 98.1 (22 Sep 2019 05:25)  HR: 95 (22 Sep 2019 05:25) (80 - 100)  BP: 183/69 (22 Sep 2019 05:25) (105/69 - 183/69)  BP(mean): --  RR: 18 (22 Sep 2019 05:25) (17 - 18)  SpO2: 97% (21 Sep 2019 21:25) (97% - 98%)    GENERAL: NAD, well-groomed, well-developed  HEAD:  Atraumatic, Normocephalic  EYES: EOMI, PERRLA, conjunctiva and sclera clear  NECK: Supple, No JVD, Normal thyroid  NERVOUS SYSTEM:  non verbal   CHEST/LUNG: Clear to auscultation bilaterally; No rales, rhonchi, wheezing, or rubs  HEART: S1S2 regular, without murmur, rub nor gallop  ABDOMEN: Soft, Nontender, Nondistended; Bowel sounds present  EXTREMITIES:  2+ Peripheral Pulses, No clubbing, cyanosis, or edema      LABS:                        8.7    13.92 )-----------( 217      ( 22 Sep 2019 05:17 )             29.0     22 Sep 2019 05:17    162    |  122    |  64     ----------------------------<  238    4.3     |  28     |  1.34     Ca    9.1        22 Sep 2019 05:17  Phos  2.7       22 Sep 2019 05:17  Mg     2.7       22 Sep 2019 05:17        CAPILLARY BLOOD GLUCOSE          assessment:  slight rise in WBC, increase in sodium, BUN    Plan: aggressive hydration.  Continue antibiotics.   To repeat BMP.     Care Discussed with Consultants/Other Providers:
Patient is a 89y old  Male who presents with a chief complaint of AMS (22 Sep 2019 06:52)      INTERVAL HPI/OVERNIGHT EVENTS: Sodium improved with hydration     MEDICATIONS  (STANDING):  dextrose 5%. 1000 milliLiter(s) (75 mL/Hr) IV Continuous <Continuous>  dextrose 5%. 1000 milliLiter(s) (50 mL/Hr) IV Continuous <Continuous>  dextrose 50% Injectable 12.5 Gram(s) IV Push once  dextrose 50% Injectable 25 Gram(s) IV Push once  dextrose 50% Injectable 25 Gram(s) IV Push once  insulin lispro (HumaLOG) corrective regimen sliding scale   SubCutaneous every 6 hours    MEDICATIONS  (PRN):  dextrose 40% Gel 15 Gram(s) Oral once PRN Blood Glucose LESS THAN 70 milliGRAM(s)/deciliter  glucagon  Injectable 1 milliGRAM(s) IntraMuscular once PRN Glucose LESS THAN 70 milligrams/deciliter              Allergies    Haldol (Other)  Haldol (Unknown)    Intolerances    aspirin (Unknown)      REVIEW OF SYSTEMS:  CARDIOVASCULAR: No chest pain, palpitations, dizziness, or leg swelling; no shortness of breath     RESPIRATORY: No cough, wheezing, chills or hemoptysis; No shortness of breath    GASTROINTESTINAL: No abdominal or epigastric pain. No nausea, vomiting, or hematemesis; No diarrhea or constipation. No melena or hematochezia.    NEUROLOGICAL: No headaches, memory loss, loss of strength, numbness      PHYSICAL EXAM:  Vital Signs Last 24 Hrs  T(C): 36.6 (23 Sep 2019 05:59), Max: 36.7 (22 Sep 2019 12:59)  T(F): 97.8 (23 Sep 2019 05:59), Max: 98.1 (22 Sep 2019 12:59)  HR: 90 (23 Sep 2019 05:59) (85 - 104)  BP: 104/63 (23 Sep 2019 05:59) (96/66 - 135/83)  BP(mean): --  RR: 18 (23 Sep 2019 05:59) (18 - 20)  SpO2: 100% (23 Sep 2019 05:59) (98% - 100%)    GENERAL: NAD, well-groomed, well-developed  HEAD:  Atraumatic, Normocephalic  EYES: EOMI, PERRLA, conjunctiva and sclera clear  NECK: Supple, No JVD, Normal thyroid  NERVOUS SYSTEM:  more responsive  CHEST/LUNG: Clear to auscultation bilaterally; No rales, rhonchi, wheezing, or rubs  HEART: S1S2 regular, without murmur, rub nor gallop  ABDOMEN: Soft, Nontender, Nondistended; Bowel sounds present        LABS:                        10.2   13.77 )-----------( 247      ( 23 Sep 2019 02:38 )             34.0     23 Sep 2019 02:38    158    |  120    |  56     ----------------------------<  85     4.5     |  28     |  1.30     Ca    8.8        23 Sep 2019 02:38  Phos  2.1       23 Sep 2019 02:38  Mg     2.6       23 Sep 2019 02:38        CAPILLARY BLOOD GLUCOSE      POCT Blood Glucose.: 165 mg/dL (23 Sep 2019 06:08)  POCT Blood Glucose.: 218 mg/dL (22 Sep 2019 22:52)  POCT Blood Glucose.: 279 mg/dL (22 Sep 2019 17:33)  POCT Blood Glucose.: 137 mg/dL (22 Sep 2019 12:15)               assessment:  pt presented with gram negative sepsis.  Dehydrated/ poor nutrition    Plan:   continue hydration/ NGT feedings. Continue antibiotics.  Swallow eval.     D/W patient's wife.
Patient is a 89y old  Male who presents with a chief complaint of AMS (23 Sep 2019 15:49)      INTERVAL HPI/OVERNIGHT EVENTS: none    MEDICATIONS  (STANDING):  dextrose 5%. 1000 milliLiter(s) (50 mL/Hr) IV Continuous <Continuous>  dextrose 50% Injectable 12.5 Gram(s) IV Push once  dextrose 50% Injectable 25 Gram(s) IV Push once  dextrose 50% Injectable 25 Gram(s) IV Push once  insulin lispro (HumaLOG) corrective regimen sliding scale   SubCutaneous every 6 hours    MEDICATIONS  (PRN):  dextrose 40% Gel 15 Gram(s) Oral once PRN Blood Glucose LESS THAN 70 milliGRAM(s)/deciliter  glucagon  Injectable 1 milliGRAM(s) IntraMuscular once PRN Glucose LESS THAN 70 milligrams/deciliter              Allergies    Haldol (Other)  Haldol (Unknown)    Intolerances    aspirin (Unknown)      REVIEW OF SYSTEMS: non verbal      PHYSICAL EXAM:  Vital Signs Last 24 Hrs  T(C): 36.6 (24 Sep 2019 05:32), Max: 37.1 (23 Sep 2019 14:28)  T(F): 97.8 (24 Sep 2019 05:32), Max: 98.7 (23 Sep 2019 14:28)  HR: 84 (24 Sep 2019 05:32) (84 - 101)  BP: 118/62 (24 Sep 2019 05:32) (99/68 - 128/87)  BP(mean): --  RR: 19 (24 Sep 2019 05:32) (16 - 19)  SpO2: 98% (24 Sep 2019 05:32) (97% - 99%)    GENERAL: NAD, well-groomed, well-developed  HEAD:  Atraumatic, Normocephalic  EYES: EOMI, PERRLA, conjunctiva and sclera clear  NECK: Supple, No JVD, Normal thyroid  NERVOUS SYSTEM:  non verbal   CHEST/LUNG: course breath sounds right greater than left.   HEART: S1S2 regular, without murmur, rub nor gallop  ABDOMEN: Soft, Nontender, Nondistended; Bowel sounds present        LABS:                        9.4    19.92 )-----------( 294      ( 24 Sep 2019 05:00 )             31.2     24 Sep 2019 05:00    154    |  112    |  50     ----------------------------<  194    4.8     |  27     |  1.23     Ca    8.6        24 Sep 2019 05:00  Phos  2.0       24 Sep 2019 05:00  Mg     2.5       24 Sep 2019 05:00    TPro  5.9    /  Alb  2.2    /  TBili  0.7    /  DBili  x      /  AST  48     /  ALT  42     /  AlkPhos  174    24 Sep 2019 05:00      CAPILLARY BLOOD GLUCOSE      POCT Blood Glucose.: 211 mg/dL (24 Sep 2019 05:48)  POCT Blood Glucose.: 268 mg/dL (24 Sep 2019 00:38)  POCT Blood Glucose.: 287 mg/dL (23 Sep 2019 18:28)  POCT Blood Glucose.: 271 mg/dL (23 Sep 2019 12:09)    from event note    Call placed to pts daughter Yasmine (HCP) & pts wife Mary (also present with daughter during phone conversation)- family discussed role of hospice and are open to pursuing hospice care.They would like to speak to a hospice care representative, hospice care referral made today.     Yasmine and Mayr are ok with removing NGT, stopping tube feeds, and beginning pleasure feeds. They would like to continue the feeds overnight and remove the NGT tomorrow morning. Will plan to remove NGT tomorrow. Will continue to monitor,        assessment:  DM, aspiration.   Dementia    Plan:   see above.  Also d/w pt's wife     Care Discussed with Consultants/Other Providers:
Patient is a 89y old  Male who presents with a chief complaint of AMS (25 Sep 2019 14:54)      INTERVAL HPI/OVERNIGHT EVENTS:    MEDICATIONS  (STANDING):  dextrose 5% + sodium chloride 0.9%. 1000 milliLiter(s) (50 mL/Hr) IV Continuous <Continuous>  dextrose 5%. 1000 milliLiter(s) (50 mL/Hr) IV Continuous <Continuous>  dextrose 50% Injectable 12.5 Gram(s) IV Push once  dextrose 50% Injectable 25 Gram(s) IV Push once  dextrose 50% Injectable 25 Gram(s) IV Push once  insulin lispro (HumaLOG) corrective regimen sliding scale   SubCutaneous every 6 hours    MEDICATIONS  (PRN):  dextrose 40% Gel 15 Gram(s) Oral once PRN Blood Glucose LESS THAN 70 milliGRAM(s)/deciliter  glucagon  Injectable 1 milliGRAM(s) IntraMuscular once PRN Glucose LESS THAN 70 milligrams/deciliter        Orders last 24 hours:  POCT  Blood Glucose (09-25-19 @ 12:16)  POCT  Blood Glucose (09-25-19 @ 18:03)  POCT  Blood Glucose (09-25-19 @ 23:44)  POCT  Blood Glucose (09-26-19 @ 06:22)      Allergies    Haldol (Other)  Haldol (Unknown)    Intolerances    aspirin (Unknown)      REVIEW OF SYSTEMS:  non verbal       PHYSICAL EXAM:  Vital Signs Last 24 Hrs  T(C): 37 (26 Sep 2019 06:12), Max: 37.1 (25 Sep 2019 17:20)  T(F): 98.6 (26 Sep 2019 06:12), Max: 98.7 (25 Sep 2019 17:20)  HR: 88 (26 Sep 2019 06:12) (72 - 90)  BP: 131/72 (26 Sep 2019 06:12) (122/70 - 142/71)  BP(mean): --  RR: 18 (26 Sep 2019 06:12) (18 - 18)  SpO2: 98% (26 Sep 2019 06:12) (98% - 100%)    GENERAL: NAD, well-groomed, well-developed  HEAD:  Atraumatic, Normocephalic  EYES: EOMI, PERRLA, conjunctiva and sclera clear  NECK: Supple, No JVD, Normal thyroid  NERVOUS SYSTEM:  Alert  CHEST/LUNG: Clear to auscultation bilaterally; No rales, rhonchi, wheezing, or rubs  HEART: S1S2 regular, without murmur, rub nor gallop  ABDOMEN: Soft, Nontender, Nondistended; Bowel sounds present        LABS:      Ca    8.6        25 Sep 2019 06:30        CAPILLARY BLOOD GLUCOSE      POCT Blood Glucose.: 206 mg/dL (26 Sep 2019 06:22)  POCT Blood Glucose.: 192 mg/dL (25 Sep 2019 23:40)  POCT Blood Glucose.: 208 mg/dL (25 Sep 2019 18:02)  POCT Blood Glucose.: 202 mg/dL (25 Sep 2019 12:10)          assessment:  pt stable   Accepted to [facility].  Awaiting transfer.

## 2019-09-27 LAB
BACTERIA BLD CULT: SIGNIFICANT CHANGE UP

## 2020-04-21 NOTE — ED ADULT NURSE NOTE - CHIEF COMPLAINT QUOTE
Name band;
fell 3 days ago, was treated and released from this ED, c/o increasing pain in bilateral ribs

## 2021-01-06 NOTE — BEHAVIORAL HEALTH ASSESSMENT NOTE - EYE CONTACT
INDICATION:

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



COMPARISON:

Comparison MRI studies are dated September 16, 2020 and June 9, 2020..



TECHNIQUE:

Sagittal and axial T1 and T2-weighted scans are acquired in the usual fashion with and

without fat saturation.  Sequences include spin echo, turbo spin-echo, and STIR

imaging sequences.  The gadolinium enhancement dose is 8 mL of intravenous ProHance.

Post gadolinium enhanced T1 fat sat sagittal and T1 axial images are acquired.



FINDINGS:

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  There is been progressive and now a

complete involvement of the L1 and L3 vertebral bodies.  The L1 and L3 vertebral

bodies extend bilaterally into their respective pedicles and superior articular

facets.  Bilateral transverse processes are involved at these levels.  There is new

epidural extension along the right and to a lesser extent left ventral lateral margin

of the spinal canal associated with the L1 vertebral body.  There is mild thecal sac

compression associated with this epidural disease.  This is a change from the most

recent prior study.  There is stable mild ventral epidural enhancement associated with

the posterior margin of the L3 vertebral body.  There is a metastatic lesion involving

the left pedicle at T12 which appear appears to be new from the prior study.  There is

abnormal signal intensity in the inferior endplate of T11 consistent with metastasis

in this location.  There is no evidence of pathologic fracture.



At L3-4, canal size developmentally somewhat small.  No focal disc protrusion is seen.



The L4-5 disc is narrowed and there is posterior diffuse disc bulging.  There is mild

central canal stenosis at this level due to disc bulging and mild bilateral ligamentum

flavum hypertrophy.



At L5-S1, there is central disc bulging.  No extra vertebral abnormality is observed.



IMPRESSION:

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

associated with metastasis at L1 and mild thecal sac compression.  No pathologic

fracture is seen.  Progressive metastatic involvement is observed in the L1, L3, and

T12 vertebral bodies.  Diffuse disc bulging at L2-3 and L4-5 with mild central canal

stenosis at L4-5.





<Electronically signed by Russ Chauhan > 01/06/21 [id number]

## 2021-05-10 NOTE — ED ADULT TRIAGE NOTE - SOURCE OF INFORMATION
Occupational Therapy Evaluation     Patient Name: Emily STOCKTON Date: 5/10/2021  Problem List  Principal Problem:    Acute diastolic CHF (congestive heart failure) ([facility] )  Active Problems:    Essential hypertension    Type 2 diabetes mellitus with hyperglycemia, without long-term current use of insulin (HCC)    Fall    Leukocytosis    Pleural effusion, bilateral    Hypoxia    Fracture of multiple ribs of left side    Macrocytic anemia    Femoral hernia    Diverticulosis    Atelectasis    Elevated CK    Multiple renal cysts    Past Medical History  Past Medical History:   Diagnosis Date    Cerebral infarction, unspecified ([facility] )     Constipation, unspecified     Diabetes mellitus ([facility] )     [facility] )     [facility] )  Patient

## 2021-06-15 NOTE — DISCHARGE NOTE PROVIDER - NSDCQMERRANDS_GEN_ALL_CORE
Impression: Age-related nuclear cataract, bilateral: H25.13.  Plan: NVS
Continue to monitor
Impression: Type 2 diabetes mellitus w/o complication: R88.9.  Plan: - No retinopathy seen on exam today
- Continue glucose, BP, and lipid control as per PCP
- Continue with annual DFE
Yes

## 2021-09-20 NOTE — ED ADULT NURSE NOTE - NSFALLRSKASSESASSIST_ED_ALL_ED
Patient Name: Haley Soliman   YOB: 1963  Room/Bed: Room/bed info not found  Medical Record Number: [id number]  Date: 9/20/2021       Sedation/ Anesthesia Plan:   intravenous sedation   as needed. Medications Planned:   midazolam (Versed) / Fentanyl  Intravenously  as needed. Preoperative Diagnosis:    1. Chronic bilateral low back pain with right-sided sciatica        Postoperative Diagnosis:    1. Chronic bilateral low back pain with right-sided sciatica        Procedure Performed:  Lumbar epidural steroid injection under fluoroscopy guidance    Blood Loss: None    Procedure: The Patient was seen in the preop area, chart was reviewed, informed consent was obtained. Patient was taken to procedure room and was placed in prone position. Vital signs were monitored through out the  Procedure. A time out was completed. The skin over the back was prepped and draped in sterile manner. The target point was marked at The interlaminar space at L4/5 . Skin and deep tissues were anesthetized with 1 % lidocaine. A 20-gauge Tuohy epidural needlele was advanced  under fluoroscopy guidance in AP view. Epidural space was identified using DAR technique. Position ws confirmed in Lateral view. Then after negative aspiration contrast dye Omnipaque-180 was injected with live fluoroscopy in AP views that showed  spread of the contrast in the epidural space  and no vascular runoff or intrathecal spread. Finally 10 ml of treatment solution containing 5 ml of  1 % PF lidocaine and 4 ml of PF NS and 1 ml of dexamethasone 10 mg / ml was injected. The needle was removed and a Band-Aid was placed over the needle  insertion site. The patient's vital signs remained stable and the patient tolerated the procedure well.       Electronically signed by Kourtney Isaac MD on 9/20/2021 at 11:19 AM     SEDATION NOTE:    ASA CLASSIFICATION  2  MP   CLASSIFICATION  3    Moderate intravenous conscious sedation was supervised by Dr. Nabor Hong  The patient was independently monitored by a Registered Nurse assigned to the Procedure Room  Monitoring included automated blood pressure, continuous EKG, Capnography and continuous pulse oximetry. The detailed Conscious Record is permanently stored in the [place] [id number].      The following is the conscious sedation record;  Start Time:  1111  End times:  1116  Duration:  5 MINUTES  MEDS GIVEN 2 MG VERSED AND 50 MCG FENTANYL yes

## 2021-12-03 NOTE — ED ADULT NURSE NOTE - PAIN RATING/NUMBER SCALE (0-10): ACTIVITY
Const: AOX3 nontoxic appearing, no apparent respiratory or physical distress. use the crutches tall boy   HEENT: NC/AT. Moist mucous membranes.  Eyes: CHAPITO. EOMI  Neck: Soft and supple. Full ROM without pain.  Resp: Speaking in full sentences. No evidence of respiratory distress.   no gross deformity of extremity, FROM knee/ hip, no fibula head/ prox fibula tenderness, (-) tenderness to base 5th MT, (-) tenderness to posterior aspect of lateral malleolus, no medial malleolus tenderness, (+) tenderness to ATFL, negative drawer test.  Skin: No rashes, abrasions or lacerations.    Neuro: Awake, alert & oriented x 3. Moves all extremities symmetrically.
0

## 2021-12-20 NOTE — PHYSICAL THERAPY INITIAL EVALUATION ADULT - LIVES WITH, PROFILE
spouse Eucrisa Counseling: Patient may experience a mild burning sensation during topical application. Eucrisa is not approved in children less than 2 years of age.

## 2022-03-09 NOTE — ED ADULT NURSE NOTE - CHIEF COMPLAINT QUOTE
Pt found to be AMS/lethargic at [facility] today. Pt s/p fall on 9/12/19 per paperwork. Pt accompanied by HHA. Pt is DNR. H/o DM, hyponatremia, dementia. no

## 2022-06-22 NOTE — PHYSICAL THERAPY INITIAL EVALUATION ADULT - PREDICTED DURATION OF THERAPY (DAYS/WKS), PT EVAL
Medicare Wellness Visit  Plan for Preventive Care    A good way for you to stay healthy is to use preventive care.  Medicare covers many services that can help you stay healthy.* The goal of these services is to find any health problems as quickly as possible. Finding problems early can help make them easier to treat.  Your personal plan below lists the services you may need and when they are due.      Health Maintenance Summary       Hepatitis C Screening (Once)  Overdue - never done    COVID-19 Vaccine (4 - Booster for Moderna series)  Overdue since 2/28/2022    Breast Cancer Screening (Every 2 Years)  Next due on 6/21/2023    Depression Screening (Yearly)  Next due on 6/21/2023    Colorectal Cancer Screen- (Colonoscopy - Every 10 Years)  Next due on 7/12/2031    DTaP/Tdap/Td Vaccine (3 - Td or Tdap)  Next due on 10/29/2031    Osteoporosis Screening   Completed    Pneumococcal Vaccine 65+   Completed    Shingles Vaccine   Completed    Influenza Vaccine   Completed    Medicare Advantage- Medicare Wellness Visit   Completed    Hepatitis B Vaccine   Aged Out    Meningococcal Vaccine   Aged Out    HPV Vaccine   Aged Out             Preventive Care for Women and Men    Heart Screenings (Cardiovascular):  Blood tests are used to check your cholesterol, lipid and triglyceride levels. High levels can increase your risk for heart disease and stroke. High levels can be treated with medications, diet and exercise. Lowering your levels can help keep your heart and blood vessels healthy.  Your provider will order these tests if they are needed.    An ultrasound is done to see if you have an abdominal aortic aneurysm (AAA).  This is an enlargement of one of the main blood vessels that delivers blood to the body.   In the [place], 9,000 deaths are caused by AAA.  You may not even know you have this problem and as many as 1 in 3 people will have a serious problem if it is not treated.  Early diagnosis allows for more 
effective treatment and cure.  If you have a family history of AAA or are a male age 65-75 who has smoked, you are at higher risk of an AAA.  Your provider can order this test, if needed.    Colorectal Screening:  There are many tests that are used to check for cancer of your colon and rectum. You and your provider should discuss what test is best for you and when to have it done.  Options include:  Screening Colonoscopy: exam of the entire colon, seen through a flexible lighted tube.  Flexible Sigmoidoscopy: exam of the last third (sigmoid portion) of the colon and rectum, seen through a flexible lighted tube.  Cologuard DNA stool test: a sample of your stool is used to screen for cancer and unseen blood in your stool.  Fecal Occult Blood Test: a sample of your stool is studied to find any unseen blood    Flu Shot:  An immunization that helps to prevent influenza (the flu). You should get this every year. The best time to get the shot is in the fall.    Pneumococcal Shot:  Vaccines are available that can help prevent pneumococcal disease, which is any type of infection caused by Streptococcus pneumoniae bacteria.   Their use can prevent some cases of pneumonia, meningitis, and sepsis. There are two types of pneumococcal vaccines:   Conjugate vaccines (PCV-13 or Prevnar 13®) - helps protect against the 13 types of pneumococcal bacteria that are the most common causes of serious infections in children and adults.    Polysaccharide vaccine (PPSV23 or [id number]®) - helps protect against 23 types of pneumococcal bacteria for patients who are recommended to get it.  These vaccines should be given at least 12 months apart.  A booster is usually not needed.     Hepatitis B Shot:  An immunization that helps to protect people from getting Hepatitis B. Hepatitis B is a virus that spreads through contact with infected blood or body fluids. Many people with the virus do not have symptoms.  The virus can lead to serious 
problems, such as liver disease. Some people are at higher risk than others. Your doctor will tell you if you need this shot.     Diabetes Screening:  A test to measure sugar (glucose) in your blood is called a fasting blood sugar. Fasting means you cannot have food or drink for at least 8 hours before the test. This test can detect diabetes long before you may notice symptoms.    Glaucoma Screening:  Glaucoma screening is performed by your eye doctor. The test measures the fluid pressure inside your eyes to determine if you have glaucoma.     Hepatitis C Screening:  A blood test to see if you have the hepatitis C virus.  Hepatitis C attacks the liver and is a major cause of chronic liver disease.  Medicare will cover a single screening for all adults born between 1945 & 1965, or high risk patients (people who have injected illegal drugs or people who have had blood transfusions).  High risk patients who continue to inject illegal drugs can be screened for Hepatitis C every year.    Smoking and Tobacco-Use Cessation Counseling:  Tobacco is the single greatest cause of disease and early death in our country today. Medication and counseling together can increase a person’s chance of quitting for good.   Medicare covers two quitting attempts per year, with four counseling sessions per attempt (eight sessions in a 12 month period)    Preventive Screening tests for Women    Screening Mammograms and Breast Exams:  An x-ray of your breasts to check for breast cancer before you or your doctor may be able to feel it.  If breast cancer is found early it can usually be treated with success.    Pelvic Exams and Pap Tests:  An exam to check for cervical and vaginal cancer. A Pap test is a lab test in which cells are taken from your cervix and sent to the lab to look for signs of cervical cancer. If cancer of the cervix is found early, chances for a cure are good. Testing can generally end at age 65, or if a woman has a 
hysterectomy for a benign condition. Your provider may recommend more frequent testing if certain abnormal results are found.    Bone Mass Measurements:  A painless x-ray of your bone density to see if you are at risk for a broken bone. Bone density refers to the thickness of bones or how tightly the bone tissue is packed.    Preventive Screening tests for Men    Prostate Screening:  Should you have a prostate cancer test (PSA)?  It is up to you to decide if you want a prostate cancer test. Talk to your clinician to find out if the test is right for you.  Things for you to consider and talk about should include:  Benefits and harms of the test  Your family history  How your race/ethnicity may influence the test  If the test may impact other medical conditions you have  Your values on screenings and treatments    *Medicare pays for many preventive services to keep you healthy. For some of these services, you might have to pay a deductible, coinsurance, and / or copayment.  The amounts vary depending on the type of services you need and the kind of Medicare health plan you have.    For further details on screenings offered by Medicare please visit: https://www.medicare.gov/coverage/preventive-screening-services     
Patient will not be placed on PT program due to inability to follow commands.
patient not appropriate for skilled PT services at this time as patient is unable to follow commands. will re assess when appropriate

## 2022-11-11 NOTE — CHART NOTE - NSCHARTNOTESELECT_GEN_ALL_CORE
Requested medication(s) are due for refill today: Yes  Patient has already received a courtesy refill: No  Other reason request has been forwarded to provider: [facility]

## 2022-12-02 NOTE — PROGRESS NOTE BEHAVIORAL HEALTH - DETAILS
Follow-up with your primary care provider if symptoms are not resolving within the next week.  
akathisia
sedation

## 2023-10-26 RX ORDER — MIDODRINE HYDROCHLORIDE 2.5 MG/1
0 TABLET ORAL
Qty: 0 | Refills: 0 | DISCHARGE

## 2023-10-26 RX ORDER — TRAZODONE HCL 50 MG
0 TABLET ORAL
Qty: 0 | Refills: 0 | DISCHARGE